# Patient Record
Sex: MALE | Race: WHITE | NOT HISPANIC OR LATINO | Employment: UNEMPLOYED | URBAN - METROPOLITAN AREA
[De-identification: names, ages, dates, MRNs, and addresses within clinical notes are randomized per-mention and may not be internally consistent; named-entity substitution may affect disease eponyms.]

---

## 2017-01-03 DIAGNOSIS — IMO0002 UNCONTROLLED DIABETES MELLITUS: ICD-10-CM

## 2017-01-03 RX ORDER — LOSARTAN POTASSIUM 25 MG/1
TABLET ORAL
Qty: 30 TABLET | Refills: 2 | Status: SHIPPED | OUTPATIENT
Start: 2017-01-03 | End: 2019-02-11

## 2017-01-03 RX ORDER — ASPIRIN 81 MG/1
TABLET ORAL
Qty: 30 TABLET | Refills: 2 | Status: SHIPPED | OUTPATIENT
Start: 2017-01-03 | End: 2019-02-18

## 2017-01-11 ENCOUNTER — OFFICE VISIT (OUTPATIENT)
Dept: ENDOCRINOLOGY | Age: 52
End: 2017-01-11

## 2017-01-11 VITALS
HEIGHT: 71 IN | BODY MASS INDEX: 31.95 KG/M2 | WEIGHT: 228.2 LBS | SYSTOLIC BLOOD PRESSURE: 130 MMHG | DIASTOLIC BLOOD PRESSURE: 82 MMHG

## 2017-01-11 DIAGNOSIS — IMO0002 UNCONTROLLED TYPE 2 DIABETES MELLITUS WITH COMPLICATION, WITHOUT LONG-TERM CURRENT USE OF INSULIN: Primary | ICD-10-CM

## 2017-01-11 DIAGNOSIS — R73.9 HYPERGLYCEMIA: ICD-10-CM

## 2017-01-11 PROCEDURE — 99214 OFFICE O/P EST MOD 30 MIN: CPT | Performed by: NURSE PRACTITIONER

## 2017-01-11 NOTE — MR AVS SNAPSHOT
Azael Carmen   1/11/2017 11:40 AM   Office Visit    Dept Phone:  [phone]   Encounter #:  [id number]    Provider:  KATERINA Hines   Department:  [facility] ENDOCRINOLOGY                Your Full Care Plan              Your Updated Medication List          This list is accurate as of: 1/11/17 12:35 PM.  Always use your most recent med list.                * albuterol (2.5 MG/3ML) 0.083% nebulizer solution   Commonly known as:  PROVENTIL       * albuterol 108 (90 BASE) MCG/ACT inhaler   Commonly known as:  PROVENTIL HFA;VENTOLIN HFA   Inhale 2 puffs Every 4 (Four) Hours As Needed for wheezing.       * aspirin 81 MG EC tablet       * aspirin 81 MG EC tablet   TAKE 1 TABLET BY MOUTH EVERY DAY       * aspirin 81 MG EC tablet   TAKE 1 TABLET BY MOUTH EVERY DAY       atorvastatin 20 MG tablet   Commonly known as:  LIPITOR   Take 1 tablet by mouth Daily.       budesonide-formoterol 160-4.5 MCG/ACT inhaler   Commonly known as:  SYMBICORT   Inhale 2 puffs 2 (Two) Times a Day.       Canagliflozin 300 MG tablet   Commonly known as:  INVOKANA   Take 300 mg by mouth Daily.       cefdinir 300 MG capsule   Commonly known as:  OMNICEF   Take 1 capsule by mouth 2 (Two) Times a Day.       fluticasone 50 MCG/ACT nasal spray   Commonly known as:  FLONASE   1 spray into each nostril Daily.       gabapentin 800 MG tablet   Commonly known as:  NEURONTIN       GuaiFENesin -20 MG tablet   TAKE 1 TABLET TWICE DAILY       Insulin Glargine 100 UNIT/ML injection pen   Commonly known as:  LANTUS SOLOSTAR   20 units in the morning NITRATE UP  UNITS MAX DAILY AS NEEDED       JANUVIA 100 MG tablet   Generic drug:  SITagliptin   TAKE 1 TABLET BY MOUTH EVERY DAY       lisinopril 10 MG tablet   Commonly known as:  PRINIVIL,ZESTRIL   TAKE 1 TABLET BY MOUTH EVERY DAY       * losartan 25 MG tablet   Commonly known as:  COZAAR       * losartan 25 MG tablet   Commonly known as:  COZAAR      TAKE 1 TABLET BY MOUTH ONCE DAILY       metFORMIN 1000 MG tablet   Commonly known as:  GLUCOPHAGE   Take 1 tablet by mouth 2 (Two) Times a Day With Meals.       * montelukast 10 MG tablet   Commonly known as:  SINGULAIR       * montelukast 10 MG tablet   Commonly known as:  SINGULAIR   TAKE 1 TABLET BY MOUTH EVERY NIGHT.       nateglinide 60 MG tablet   Commonly known as:  STARLIX   Take 1 tablet by mouth 3 (Three) Times a Day Before Meals.       Pen Needles 31G X 6 MM misc   One inj daily       predniSONE 10 MG tablet   Commonly known as:  DELTASONE   TAKE 5 TABS X 2 DAYS THEN 4 TABS X 2 DAYS THEN 3 TABS X 3 DAYS THEN 2 TABS X 3 DAYS THEN 1 TAB X 3 DAYS THEN STOP.       ticagrelor 90 MG tablet tablet   Commonly known as:  BRILINTA   Take 1 tablet by mouth 2 (Two) Times a Day.       V-R COMPLETE SENIOR tablet       varenicline 0.5 MG X 11 & 1 MG X 42 tablet   Commonly known as:  CHANTIX STARTING MONTH GUADALUPE   Take 0.5 mg one daily on days 1-2 and 0.5 mg twice daily on days 4-7. Then 1 mg twice daily for a total of 12 weeks.       vitamin D 88406 UNITS capsule capsule   Commonly known as:  ERGOCALCIFEROL       * Notice:  This list has 9 medication(s) that are the same as other medications prescribed for you. Read the directions carefully, and ask your doctor or other care provider to review them with you.            You Were Diagnosed With        Codes Comments    Uncontrolled type 2 diabetes mellitus with complication, without long-term current use of insulin    -  Primary ICD-10-CM: E11.8, E11.65  ICD-9-CM: 250.82     Hyperglycemia     ICD-10-CM: R73.9  ICD-9-CM: 790.29       Instructions    home blood tests - Blood glucose testing: 3-4 times daily, that are: fasting- 1st thing in morning before eating or drinking, before each meal and 1 or 2 hours after meal, bedtime, anytime you feel symptoms of hyperglycemia or hypoglycemia (high or low blood sugars)     New instructions for basal insulIn Lantus U100 30 units in  AM - Titration instructions - increase AM dose 1 units every 1 days if blood sugar before evening meal is over 120mg/dl      continue Starlix 60 minutes before each meal if you do not eat do not take,    Continue the metformin and Invokana     Patient Instructions History      Upcoming Appointments     Visit Type Date Time Department    OFFICE VISIT 2017 11:40 AM [facility]    OFFICE VISIT 2017 11:45 AM [facility]    [facility] 3/9/2017 10:00 AM [facility]    OFFICE VISIT 3/16/2017  2:40 PM [facility]    [facility] 2017 10:00 AM [facility]    OFFICE VISIT 2017 10:40 AM [facility]      [email or web address] Signup     [email or web address] allows you to send messages to your doctor, view your test results, renew your prescriptions, schedule appointments, and more. To sign up, go to [email or web address] and click on the Sign Up Now link in the New User? box. Enter your [employer] Activation Code exactly as it appears below along with the last four digits of your Social Security Number and your Date of Birth () to complete the sign-up process. If you do not sign up before the expiration date, you must request a new code.    [employer] Activation Code: [email or web address]  Expires: 2017 12:35 PM    If you have questions, you can email [email or web address] or call [phone] to talk to our [employer] staff. Remember, [employer] is NOT to be used for urgent needs. For medical emergencies, dial 911.               Other Info from Your Visit           Your Appointments     2017 11:45 AM EST   Office Visit with Yaquelin Burroughs MD   [facility] (--)    [facility]. [address] [place] [id number] [phone]           Arrive 15 minutes prior to appointment.            Mar 09, 2017 10:00 AM EST   [facility] with [facility], [facility]   [facility] (--)    [address]  [facility] [id number] [phone]            Mar 16, 2017  2:40 PM EDT   Office Visit with David Hardy MD   [facility] ENDOCRINOLOGY (--)    [address] [facility]  [facility] [id number] [phone]           Arrive 15 minutes prior to appointment.            Jun 12, 2017 10:00 AM EDT   [facility] with [facility]   [facility] (--)    [address] [facility] [facility] [place] [id number] [phone]            Jun 19, 2017 10:40 AM EDT   Office Visit with KATERINA Hines   [facility] ENDOCRINOLOGY (--)    [address] [place] [id number] [phone]           Arrive 15 minutes prior to appointment.              Allergies     Penicillins  Rash      Reason for Visit     Follow-up DM2, labs 12/7/16, (RM 1)      Vital Signs     Blood Pressure Height Weight Body Mass Index Smoking Status       130/82 71\" (180.3 cm) 228 lb 3.2 oz (104 kg) 31.83 kg/m2 Current Every Day Smoker       Problems and Diagnoses Noted     Type II diabetes mellitus without control    Hyperglycemia            "

## 2017-01-11 NOTE — PATIENT INSTRUCTIONS
home blood tests - Blood glucose testing: 3-4 times daily, that are: fasting- 1st thing in morning before eating or drinking, before each meal and 1 or 2 hours after meal, bedtime, anytime you feel symptoms of hyperglycemia or hypoglycemia (high or low blood sugars)     New instructions for basal insulIn Lantus U100 30 units in AM - Titration instructions - increase AM dose 1 units every 1 days if blood sugar before evening meal is over 120mg/dl      continue Starlix 60 minutes before each meal if you do not eat do not take,    Continue the metformin and Invokana

## 2017-01-11 NOTE — PROGRESS NOTES
Azael Carmen  Presents to the office  for the follow-up appointment for Type 2 diabetes mellitus.     Location is system with the  clinical course has fluctuated, the severity is Mild, and the modifying/allievating factors are insulin.  Medications and  Dosages were  reviewed with Azael Carmen and suggested that compliance most of the time.    Associated symptoms of hyperglycemia have been none.  Associated symptoms of hypoglycemia have been dizziness and sweating. Patient reports  hypoglycemia threshold for symptoms is 100 mg/dl .     The patient is currently on insulin.    Compliance with blood glucose monitoring: good.     Meal panning: The patient is using no plan.    The patient is currently taking home blood tests - Blood glucose testing: 3 times daily, that are: fasting- 1st thing in morning before eating or drinking  before each meal  The basal insulIn  Lantus U100 20 units in AM with his oral medications.     Last instructions given were:   New instructions for basal insulIn Lantus U100 20 units in AM - Titration instructions - increase AM dose 1 units every 1 days if blood sugar before evening meal is over 120mg/dl    Start Starlix 60 minutes before each meal if you do not eat do not take   Continue the metformin and Invokana       Home blood glucose testing daily: 3  FB to 145 mg/dl  before lunch 130 to 140 mg/dl  before dinner/supper 140 to 150 mg/dl    Last reported blood glucose readings are:   Home blood glucose testing daily: 3  FB to 140 mg/dl  after breakfast . to . mg/dl  before lunch 140 to 150 mg/dl  after lunch . to . mg/dl  before dinner/supper 140 to 150 mg/dl  after dinner/supper 150 to 160 mg/dl    Patterns reported per patient are none.          The following portions of the patient's history were reviewed and updated as appropriate: current medications, past family history, past medical history, past social history, past surgical history and problem list.      Current  Outpatient Prescriptions:   •  albuterol (PROVENTIL HFA;VENTOLIN HFA) 108 (90 BASE) MCG/ACT inhaler, Inhale 2 puffs Every 4 (Four) Hours As Needed for wheezing., Disp: 18 g, Rfl: 0  •  albuterol (PROVENTIL) (2.5 MG/3ML) 0.083% nebulizer solution, Take 2.5 mg by nebulization every 4 (four) hours as needed for wheezing., Disp: , Rfl:   •  aspirin 81 MG EC tablet, Take 81 mg by mouth daily., Disp: , Rfl:   •  aspirin 81 MG EC tablet, TAKE 1 TABLET BY MOUTH EVERY DAY, Disp: 30 tablet, Rfl: 2  •  aspirin 81 MG EC tablet, TAKE 1 TABLET BY MOUTH EVERY DAY, Disp: 30 tablet, Rfl: 2  •  atorvastatin (LIPITOR) 20 MG tablet, Take 1 tablet by mouth Daily., Disp: 30 tablet, Rfl: 2  •  budesonide-formoterol (SYMBICORT) 160-4.5 MCG/ACT inhaler, Inhale 2 puffs 2 (Two) Times a Day., Disp: 1 inhaler, Rfl: 3  •  Canagliflozin (INVOKANA) 300 MG tablet, Take 300 mg by mouth Daily., Disp: 30 tablet, Rfl: 4  •  cefdinir (OMNICEF) 300 MG capsule, Take 1 capsule by mouth 2 (Two) Times a Day., Disp: 20 capsule, Rfl: 0  •  Dextromethorphan-Guaifenesin (GUAIFENESIN DM) 400-20 MG tablet, TAKE 1 TABLET TWICE DAILY, Disp: 56 tablet, Rfl: 0  •  fluticasone (FLONASE) 50 MCG/ACT nasal spray, 1 spray into each nostril Daily., Disp: 1 each, Rfl: 3  •  gabapentin (NEURONTIN) 800 MG tablet, Take 800 mg by mouth 3 (Three) Times a Day., Disp: , Rfl:   •  Insulin Glargine (LANTUS SOLOSTAR) 100 UNIT/ML injection pen, 20 units in the morning NITRATE UP  UNITS MAX DAILY AS NEEDED, Disp: 3 pen, Rfl: 4  •  Insulin Pen Needle (PEN NEEDLES) 31G X 6 MM misc, One inj daily, Disp: 60 each, Rfl: 4  •  JANUVIA 100 MG tablet, TAKE 1 TABLET BY MOUTH EVERY DAY, Disp: 30 tablet, Rfl: 2  •  lisinopril (PRINIVIL,ZESTRIL) 10 MG tablet, TAKE 1 TABLET BY MOUTH EVERY DAY, Disp: 30 tablet, Rfl: 2  •  losartan (COZAAR) 25 MG tablet, Take 25 mg by mouth daily., Disp: , Rfl:   •  losartan (COZAAR) 25 MG tablet, TAKE 1 TABLET BY MOUTH ONCE DAILY, Disp: 30 tablet, Rfl: 2  •   metFORMIN (GLUCOPHAGE) 1000 MG tablet, Take 1 tablet by mouth 2 (Two) Times a Day With Meals., Disp: 60 tablet, Rfl: 4  •  montelukast (SINGULAIR) 10 MG tablet, Take 10 mg by mouth every night., Disp: , Rfl:   •  montelukast (SINGULAIR) 10 MG tablet, TAKE 1 TABLET BY MOUTH EVERY NIGHT., Disp: 30 tablet, Rfl: 2  •  Multiple Vitamins-Minerals (V-R COMPLETE SENIOR) tablet, Take 1 tablet by mouth daily., Disp: , Rfl:   •  nateglinide (STARLIX) 60 MG tablet, Take 1 tablet by mouth 3 (Three) Times a Day Before Meals., Disp: 90 tablet, Rfl: 4  •  predniSONE (DELTASONE) 10 MG tablet, TAKE 5 TABS X 2 DAYS THEN 4 TABS X 2 DAYS THEN 3 TABS X 3 DAYS THEN 2 TABS X 3 DAYS THEN 1 TAB X 3 DAYS THEN STOP., Disp: 36 tablet, Rfl: 0  •  ticagrelor (BRILINTA) 90 MG tablet tablet, Take 1 tablet by mouth 2 (Two) Times a Day., Disp: 120 tablet, Rfl: 6  •  varenicline (CHANTIX STARTING MONTH PAK) 0.5 MG X 11 & 1 MG X 42 tablet, Take 0.5 mg one daily on days 1-2 and 0.5 mg twice daily on days 4-7. Then 1 mg twice daily for a total of 12 weeks., Disp: 53 tablet, Rfl: 0  •  vitamin D (ERGOCALCIFEROL) 89628 UNITS capsule capsule, Take 50,000 Units by mouth 1 (One) Time Per Week., Disp: , Rfl:     Patient Active Problem List    Diagnosis   • Hypogonadism in male [E29.1]   • COPD, mild [J44.9]   • Chronic fatigue [R53.82]   • Elevated CPK [R74.8]   • Mixed hyperlipidemia [E78.2]   • Abnormal chest CT [R93.8]   • Arthritis [M19.90]   • Asthma [J45.909]   • Bulging of lumbar intervertebral disc [M51.26]   • Cervical disc disorder with radiculopathy of mid-cervical region [M50.120]   • Cervical radiculitis [M54.12]   • Cervicalgia [M54.2]   • Diabetes mellitus type 2, uncontrolled [E11.65]   • ED (erectile dysfunction) of organic origin [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.9]   • Paresthesias [R20.2]   • Obstructive sleep apnea [G47.33]   • Chest pain in adult [R07.9]       Review of Systems   A comprehensive review of the 14 systems was negative except of listed  "below:  Neurological: positive for numbness and tingling in feet & hands  Endocrine: hyperglycemia     Objective:     Wt Readings from Last 3 Encounters:   01/11/17 228 lb 3.2 oz (104 kg)   12/13/16 236 lb 3.2 oz (107 kg)   12/12/16 235 lb 6.4 oz (107 kg)     Temp Readings from Last 3 Encounters:   12/12/16 98.5 °F (36.9 °C) (Oral)   12/07/16 98.2 °F (36.8 °C) (Oral)   10/08/16 98.1 °F (36.7 °C) (Oral)     BP Readings from Last 3 Encounters:   01/11/17 130/82   12/13/16 160/80   12/12/16 116/60     Pulse Readings from Last 3 Encounters:   12/12/16 96   12/07/16 98   10/08/16 67          Visit Vitals   • /82   • Ht 71\" (180.3 cm)   • Wt 228 lb 3.2 oz (104 kg)   • BMI 31.83 kg/m2       General appearance:  alert, cooperative, delirious, fatigued, nourished\" \"appears stated age and cooperative\" \"NAD   Neck: no carotid bruit, supple, symmetrical, trachea midline and thyroid not enlarged, symmetric, no tenderness/mass/nodules   Thyroid:  no palpable nodule, no enlargement, no tenderness   Lung:  \"clear to auscultation bilaterally\" \"no abnormal breath sounds  \" effort was normal, no labored breath, no use of accessory muscles.   Heart: regular rate and rhythm, S1, S2 normal, no murmur, click, rub or gallop      Abdomen:  normal bowel sounds- 4 quads, soft non-tender, contour - rounded and contour - obese      Extremities: extremities normal, atraumatic, no cyanosis or edema extremities normal, atraumatic, no cyanosis or edema\" WNL - gait and station, strength and stability\"       Skin:   Pulses:  warm and dry, no hyperpigmentation, normal skin coloring, or suspicious lesions   2+ and symmetric   Neuro: Alert and oriented x3. Gait normal. Reflexes and motor strength normal and symmetric. Cranial nerves 2-12 and sensation grossly intact.      Psych: behavior - normal, judgement - normal, mood - normal, affect - normal                 Lab Review  Results for orders placed or performed in visit on 12/13/16   C-Peptide "   Result Value Ref Range    C-Peptide 3.3 1.1 - 4.4 ng/mL   Vitamin D 25 Hydroxy   Result Value Ref Range    25 Hydroxy, Vitamin D 24.6 (L) 30.0 - 100.0 ng/mL   Insulin, Total   Result Value Ref Range    Insulin 14.9 2.6 - 24.9 uIU/mL   Microalbumin / Creatinine Urine Ratio   Result Value Ref Range    Creatinine, Urine 39.9 Not Estab. mg/dL    Microalbumin, Urine 45.2 Not Estab. ug/mL    Microalbumin/Creatinine Ratio Urine 113.3 (H) 0.0 - 30.0 mg/g creat       Office Visit on 12/13/2016   Component Date Value Ref Range Status   • C-Peptide 12/13/2016 3.3  1.1 - 4.4 ng/mL Final    C-Peptide reference interval is for fasting patients.   • 25 Hydroxy, Vitamin D 12/13/2016 24.6* 30.0 - 100.0 ng/mL Final    Comment: Reference Range for Total Vitamin D 25(OH)  Deficiency    <20.0 ng/mL  Insufficiency 21-29 ng/mL  Sufficiency    ng/mL  Toxicity      >100 ng/ml        • Insulin 12/13/2016 14.9  2.6 - 24.9 uIU/mL Final   • Creatinine, Urine 12/13/2016 39.9  Not Estab. mg/dL Final   • Microalbumin, Urine 12/13/2016 45.2  Not Estab. ug/mL Final   • Microalbumin/Creatinine Ratio Urine 12/13/2016 113.3* 0.0 - 30.0 mg/g creat Final           Assessment:   Azael was seen today for follow-up.    Diagnoses and all orders for this visit:    Uncontrolled type 2 diabetes mellitus with complication, without long-term current use of insulin    Hyperglycemia        Plan:    Education:  interpretation of lab results, blood sugar goals, complications of diabetes mellitus, hypoglycemia prevention and treatment, exercise, illness management, self-monitoring of blood glucose skills, nutrition and carbohydrate counting    Medications changes:  home blood tests - Blood glucose testing: 3-4 times daily, that are: fasting- 1st thing in morning before eating or drinking, before each meal and 1 or 2 hours after meal, bedtime, anytime you feel symptoms of hyperglycemia or hypoglycemia (high or low blood sugars)     New instructions for basal  insulIn Lantus U100 30 units in AM - Titration instructions - increase AM dose 1 units every 1 days if blood sugar before evening meal is over 120mg/dl      continue Starlix 60 minutes before each meal if you do not eat do not take,    Continue the metformin and Invokana      Office appointment 24 minutes with additional education given 12 minutes - insulin changes with titration of insulin, SMBG with goals.       Return in about 4 months (around 5/11/2017), or if symptoms worsen or fail to improve. keep March appointment and June appointment with Dr. Hardy and myself and lab appointment, call with any problems or concerns can be seen before appointments if needed

## 2017-01-12 ENCOUNTER — OFFICE VISIT (OUTPATIENT)
Dept: CARDIOLOGY | Facility: CLINIC | Age: 52
End: 2017-01-12

## 2017-01-12 VITALS
HEART RATE: 82 BPM | SYSTOLIC BLOOD PRESSURE: 149 MMHG | BODY MASS INDEX: 31.24 KG/M2 | WEIGHT: 224 LBS | DIASTOLIC BLOOD PRESSURE: 92 MMHG

## 2017-01-12 DIAGNOSIS — R06.02 SHORTNESS OF BREATH: ICD-10-CM

## 2017-01-12 DIAGNOSIS — J44.9 COPD, MILD (HCC): ICD-10-CM

## 2017-01-12 DIAGNOSIS — I25.10 CAD IN NATIVE ARTERY: ICD-10-CM

## 2017-01-12 DIAGNOSIS — G47.33 OBSTRUCTIVE SLEEP APNEA: Primary | ICD-10-CM

## 2017-01-12 PROCEDURE — 99213 OFFICE O/P EST LOW 20 MIN: CPT | Performed by: INTERNAL MEDICINE

## 2017-01-12 PROCEDURE — 93000 ELECTROCARDIOGRAM COMPLETE: CPT | Performed by: INTERNAL MEDICINE

## 2017-01-12 NOTE — LETTER
2017     CAROLINA Tiwari  [address] [facility] [address]    Patient: Azael Carmen   YOB: 1965   Date of Visit: 2017       Dear CAROLINA Bess:    Thank you for referring Azael Carmen to me for evaluation. Below are the relevant portions of my assessment and plan of care.    If you have questions, please do not hesitate to call me. I look forward to following Azael along with you.         Sincerely,        Yaquelin Burroughs MD        CC: No Recipients  Yaquelin Burroughs MD  2017  1:33 PM  Sign at close encounter  Procedure   [facility]  Cardiology Progress Note    Patient Identification:  Name:Azael Carmen  Age:51 y.o.  Sex: male  :  1965  MRN: [id number]           2017    Subjective:    Chief Complaint   Patient presents with   • Chest Pain   CAD    HPI     Patient is a 51 y.o. male with copd, smoking came for PCI of the severe stenosis in the right coronary artery which he underwent with Bioabsorbable stent with excellent result. No post op complications. No angina     He continues to smoke and has paraxysmal wheezing and smoking cessation is recommended     No angina post procedure     He did have a  of mid LCX and he is not having any angina. Smoking cessation program is strongly recommended    He also has stable gall stone    Review of Systems   Constitution: Positive for malaise/fatigue. Negative for chills and fever.   HENT: Negative for headaches.    Eyes: Negative for blurred vision and double vision.   Cardiovascular: Negative for chest pain, irregular heartbeat, leg swelling and palpitations.   Respiratory: Positive for snoring (cpap). Negative for shortness of breath.    Skin: Negative for color change.   Musculoskeletal: Negative for arthritis and joint pain.   Gastrointestinal: Negative for abdominal pain, nausea and vomiting.   Neurological: Positive for light-headedness (when stands to fast) and numbness.  Negative for dizziness.   Psychiatric/Behavioral: Positive for depression.       Past Medical History   Diagnosis Date   • Allergic rhinitis    • Asthma    • COPD (chronic obstructive pulmonary disease)    • Diabetes mellitus    • Heart murmur    • Hypertension    • Mixed hyperlipidemia 7/19/2016       Past Surgical History   Procedure Laterality Date   • No past surgeries     • Vasectomy     • Cardiac catheterization N/A 9/6/2016     Procedure: Coronary angiography;  Surgeon: Blaise Burroughs MD;  Location: [facility] CATH INVASIVE LOCATION;  Service:    • Cardiac catheterization N/A 9/6/2016     Procedure: Left Heart Cath;  Surgeon: Blaise Burroughs MD;  Location:  [facility] CATH INVASIVE LOCATION;  Service:    • Cardiac catheterization N/A 9/6/2016     Procedure: Left ventriculography;  Surgeon: Blaise Burroughs MD;  Location: [facility] CATH INVASIVE LOCATION;  Service:    • Cardiac catheterization N/A 10/7/2016     Procedure: Chronic Total Occlussion;  Surgeon: Blaise Burroughs MD;  Location: [facility] CATH INVASIVE LOCATION;  Service:        Social History     Social History   • Marital status:      Spouse name: N/A   • Number of children: N/A   • Years of education: N/A     Occupational History   • Not on file.     Social History Main Topics   • Smoking status: Current Every Day Smoker     Packs/day: 0.50     Years: 40.00     Types: Cigarettes   • Smokeless tobacco: Never Used   • Alcohol use No   • Drug use: No   • Sexual activity: Defer     Other Topics Concern   • Not on file     Social History Narrative       Family History   Problem Relation Age of Onset   • Hypertension Father    • Heart disease Father    • Heart attack Father    • Hypertension Brother    • Cancer Paternal Aunt    • Cancer Maternal Grandmother    • Cancer Maternal Grandfather    • Cancer Paternal Grandmother    • Cancer Paternal Grandfather        Scheduled Meds:    Current Outpatient Prescriptions:   •  albuterol  (PROVENTIL HFA;VENTOLIN HFA) 108 (90 BASE) MCG/ACT inhaler, Inhale 2 puffs Every 4 (Four) Hours As Needed for wheezing., Disp: 18 g, Rfl: 0  •  albuterol (PROVENTIL) (2.5 MG/3ML) 0.083% nebulizer solution, Take 2.5 mg by nebulization every 4 (four) hours as needed for wheezing., Disp: , Rfl:   •  aspirin 81 MG EC tablet, Take 81 mg by mouth daily., Disp: , Rfl:   •  aspirin 81 MG EC tablet, TAKE 1 TABLET BY MOUTH EVERY DAY, Disp: 30 tablet, Rfl: 2  •  aspirin 81 MG EC tablet, TAKE 1 TABLET BY MOUTH EVERY DAY, Disp: 30 tablet, Rfl: 2  •  atorvastatin (LIPITOR) 20 MG tablet, Take 1 tablet by mouth Daily., Disp: 30 tablet, Rfl: 2  •  budesonide-formoterol (SYMBICORT) 160-4.5 MCG/ACT inhaler, Inhale 2 puffs 2 (Two) Times a Day., Disp: 1 inhaler, Rfl: 3  •  Canagliflozin (INVOKANA) 300 MG tablet, Take 300 mg by mouth Daily., Disp: 30 tablet, Rfl: 4  •  cefdinir (OMNICEF) 300 MG capsule, Take 1 capsule by mouth 2 (Two) Times a Day., Disp: 20 capsule, Rfl: 0  •  Dextromethorphan-Guaifenesin (GUAIFENESIN DM) 400-20 MG tablet, TAKE 1 TABLET TWICE DAILY, Disp: 56 tablet, Rfl: 0  •  fluticasone (FLONASE) 50 MCG/ACT nasal spray, 1 spray into each nostril Daily., Disp: 1 each, Rfl: 3  •  gabapentin (NEURONTIN) 800 MG tablet, Take 800 mg by mouth 3 (Three) Times a Day., Disp: , Rfl:   •  Insulin Glargine (LANTUS SOLOSTAR) 100 UNIT/ML injection pen, 20 units in the morning NITRATE UP  UNITS MAX DAILY AS NEEDED, Disp: 3 pen, Rfl: 4  •  Insulin Pen Needle (PEN NEEDLES) 31G X 6 MM misc, One inj daily, Disp: 60 each, Rfl: 4  •  JANUVIA 100 MG tablet, TAKE 1 TABLET BY MOUTH EVERY DAY, Disp: 30 tablet, Rfl: 2  •  lisinopril (PRINIVIL,ZESTRIL) 10 MG tablet, TAKE 1 TABLET BY MOUTH EVERY DAY, Disp: 30 tablet, Rfl: 2  •  losartan (COZAAR) 25 MG tablet, TAKE 1 TABLET BY MOUTH ONCE DAILY, Disp: 30 tablet, Rfl: 2  •  metFORMIN (GLUCOPHAGE) 1000 MG tablet, Take 1 tablet by mouth 2 (Two) Times a Day With Meals., Disp: 60 tablet, Rfl: 4  •   montelukast (SINGULAIR) 10 MG tablet, TAKE 1 TABLET BY MOUTH EVERY NIGHT., Disp: 30 tablet, Rfl: 2  •  Multiple Vitamins-Minerals (V-R COMPLETE SENIOR) tablet, Take 1 tablet by mouth daily., Disp: , Rfl:   •  nateglinide (STARLIX) 60 MG tablet, Take 1 tablet by mouth 3 (Three) Times a Day Before Meals., Disp: 90 tablet, Rfl: 4  •  predniSONE (DELTASONE) 10 MG tablet, TAKE 5 TABS X 2 DAYS THEN 4 TABS X 2 DAYS THEN 3 TABS X 3 DAYS THEN 2 TABS X 3 DAYS THEN 1 TAB X 3 DAYS THEN STOP., Disp: 36 tablet, Rfl: 0  •  ticagrelor (BRILINTA) 90 MG tablet tablet, Take 1 tablet by mouth 2 (Two) Times a Day., Disp: 120 tablet, Rfl: 6  •  varenicline (CHANTIX STARTING MONTH PAK) 0.5 MG X 11 & 1 MG X 42 tablet, Take 0.5 mg one daily on days 1-2 and 0.5 mg twice daily on days 4-7. Then 1 mg twice daily for a total of 12 weeks., Disp: 53 tablet, Rfl: 0  •  vitamin D (ERGOCALCIFEROL) 42527 UNITS capsule capsule, Take 50,000 Units by mouth 1 (One) Time Per Week., Disp: , Rfl:     Objective:  Visit Vitals   • /92   • Pulse 82   • Wt 224 lb (102 kg)   • BMI 31.24 kg/m2        Physical Exam  Physical Exam:    General: No acute distress.    Skin: Warm and dry, no diaphoresis noted   HEENT: No ptosis; external ear and nose normal; oral mucosa moist   Neck: Supple; no carotid bruits; no JVD, Trachea mid line   Heart: S1S2 regular rate and rhythm; no murmurs; no gallop or rub appreciated, apex not displaced   Chest: Respirations regular, unlabored at rest, bilateral breath sounds have good air entry; no  wheezes auscultated.     Abdomen: Soft, non-tender, non-distended, positive bowel sounds  No hepatosplenomegaly   Extremities: Bilateral lower extremities have no pre-tibial pitting edema; Radials are palpable   Neurological: Alert and oriented x 3; no new motor deficits,           ECG 12 Lead  Date/Time: 1/12/2017 1:31 PM  Performed by: KYRA GALVEZ  Authorized by: KYRA GALVEZ   Comparison: compared with previous  ECG   Similar to previous ECG  Rhythm: sinus rhythm  Rate: normal  QRS axis: normal               Assessment:  Problem List Items Addressed This Visit        Respiratory    Obstructive sleep apnea - Primary    COPD, mild    RESOLVED: Shortness of breath          Plan:    Overall clinically he has been stable with no further angina. I will not recommend the  recanalization of the LCX at this time.  Continue DAPT. I will see him back in 6 months. Again smoking cessation is recommended.      01/12/2017  Blaise Burroughs MD, FACC

## 2017-01-12 NOTE — PROGRESS NOTES
Procedure   [facility]  Cardiology Progress Note    Patient Identification:  Name:Azael Carmen  Age:51 y.o.  Sex: male  :  1965  MRN: [id number]           2017    Subjective:    Chief Complaint   Patient presents with   • Chest Pain   CAD    HPI     Patient is a 51 y.o. male with copd, smoking came for PCI of the severe stenosis in the right coronary artery which he underwent with Bioabsorbable stent with excellent result. No post op complications. No angina     He continues to smoke and has paraxysmal wheezing and smoking cessation is recommended     No angina post procedure     He did have a  of mid LCX and he is not having any angina. Smoking cessation program is strongly recommended    He also has stable gall stone    Review of Systems   Constitution: Positive for malaise/fatigue. Negative for chills and fever.   HENT: Negative for headaches.    Eyes: Negative for blurred vision and double vision.   Cardiovascular: Negative for chest pain, irregular heartbeat, leg swelling and palpitations.   Respiratory: Positive for snoring (cpap). Negative for shortness of breath.    Skin: Negative for color change.   Musculoskeletal: Negative for arthritis and joint pain.   Gastrointestinal: Negative for abdominal pain, nausea and vomiting.   Neurological: Positive for light-headedness (when stands to fast) and numbness. Negative for dizziness.   Psychiatric/Behavioral: Positive for depression.       Past Medical History   Diagnosis Date   • Allergic rhinitis    • Asthma    • COPD (chronic obstructive pulmonary disease)    • Diabetes mellitus    • Heart murmur    • Hypertension    • Mixed hyperlipidemia 2016       Past Surgical History   Procedure Laterality Date   • No past surgeries     • Vasectomy     • Cardiac catheterization N/A 2016     Procedure: Coronary angiography;  Surgeon: Blaise Burroughs MD;  Location: [facility] INVASIVE LOCATION;  Service:    • Cardiac  catheterization N/A 9/6/2016     Procedure: Left Heart Cath;  Surgeon: Blaise Burroughs MD;  Location: [facility] CATH INVASIVE LOCATION;  Service:    • Cardiac catheterization N/A 9/6/2016     Procedure: Left ventriculography;  Surgeon: Blaise Burroughs MD;  Location: [facility] CATH INVASIVE LOCATION;  Service:    • Cardiac catheterization N/A 10/7/2016     Procedure: Chronic Total Occlussion;  Surgeon: Blaise Burroughs MD;  Location: [facility] CATH INVASIVE LOCATION;  Service:        Social History     Social History   • Marital status:      Spouse name: N/A   • Number of children: N/A   • Years of education: N/A     Occupational History   • Not on file.     Social History Main Topics   • Smoking status: Current Every Day Smoker     Packs/day: 0.50     Years: 40.00     Types: Cigarettes   • Smokeless tobacco: Never Used   • Alcohol use No   • Drug use: No   • Sexual activity: Defer     Other Topics Concern   • Not on file     Social History Narrative       Family History   Problem Relation Age of Onset   • Hypertension Father    • Heart disease Father    • Heart attack Father    • Hypertension Brother    • Cancer Paternal Aunt    • Cancer Maternal Grandmother    • Cancer Maternal Grandfather    • Cancer Paternal Grandmother    • Cancer Paternal Grandfather        Scheduled Meds:    Current Outpatient Prescriptions:   •  albuterol (PROVENTIL HFA;VENTOLIN HFA) 108 (90 BASE) MCG/ACT inhaler, Inhale 2 puffs Every 4 (Four) Hours As Needed for wheezing., Disp: 18 g, Rfl: 0  •  albuterol (PROVENTIL) (2.5 MG/3ML) 0.083% nebulizer solution, Take 2.5 mg by nebulization every 4 (four) hours as needed for wheezing., Disp: , Rfl:   •  aspirin 81 MG EC tablet, Take 81 mg by mouth daily., Disp: , Rfl:   •  aspirin 81 MG EC tablet, TAKE 1 TABLET BY MOUTH EVERY DAY, Disp: 30 tablet, Rfl: 2  •  aspirin 81 MG EC tablet, TAKE 1 TABLET BY MOUTH EVERY DAY, Disp: 30 tablet, Rfl: 2  •  atorvastatin (LIPITOR) 20 MG tablet,  Take 1 tablet by mouth Daily., Disp: 30 tablet, Rfl: 2  •  budesonide-formoterol (SYMBICORT) 160-4.5 MCG/ACT inhaler, Inhale 2 puffs 2 (Two) Times a Day., Disp: 1 inhaler, Rfl: 3  •  Canagliflozin (INVOKANA) 300 MG tablet, Take 300 mg by mouth Daily., Disp: 30 tablet, Rfl: 4  •  cefdinir (OMNICEF) 300 MG capsule, Take 1 capsule by mouth 2 (Two) Times a Day., Disp: 20 capsule, Rfl: 0  •  Dextromethorphan-Guaifenesin (GUAIFENESIN DM) 400-20 MG tablet, TAKE 1 TABLET TWICE DAILY, Disp: 56 tablet, Rfl: 0  •  fluticasone (FLONASE) 50 MCG/ACT nasal spray, 1 spray into each nostril Daily., Disp: 1 each, Rfl: 3  •  gabapentin (NEURONTIN) 800 MG tablet, Take 800 mg by mouth 3 (Three) Times a Day., Disp: , Rfl:   •  Insulin Glargine (LANTUS SOLOSTAR) 100 UNIT/ML injection pen, 20 units in the morning NITRATE UP  UNITS MAX DAILY AS NEEDED, Disp: 3 pen, Rfl: 4  •  Insulin Pen Needle (PEN NEEDLES) 31G X 6 MM misc, One inj daily, Disp: 60 each, Rfl: 4  •  JANUVIA 100 MG tablet, TAKE 1 TABLET BY MOUTH EVERY DAY, Disp: 30 tablet, Rfl: 2  •  lisinopril (PRINIVIL,ZESTRIL) 10 MG tablet, TAKE 1 TABLET BY MOUTH EVERY DAY, Disp: 30 tablet, Rfl: 2  •  losartan (COZAAR) 25 MG tablet, TAKE 1 TABLET BY MOUTH ONCE DAILY, Disp: 30 tablet, Rfl: 2  •  metFORMIN (GLUCOPHAGE) 1000 MG tablet, Take 1 tablet by mouth 2 (Two) Times a Day With Meals., Disp: 60 tablet, Rfl: 4  •  montelukast (SINGULAIR) 10 MG tablet, TAKE 1 TABLET BY MOUTH EVERY NIGHT., Disp: 30 tablet, Rfl: 2  •  Multiple Vitamins-Minerals (V-R COMPLETE SENIOR) tablet, Take 1 tablet by mouth daily., Disp: , Rfl:   •  nateglinide (STARLIX) 60 MG tablet, Take 1 tablet by mouth 3 (Three) Times a Day Before Meals., Disp: 90 tablet, Rfl: 4  •  predniSONE (DELTASONE) 10 MG tablet, TAKE 5 TABS X 2 DAYS THEN 4 TABS X 2 DAYS THEN 3 TABS X 3 DAYS THEN 2 TABS X 3 DAYS THEN 1 TAB X 3 DAYS THEN STOP., Disp: 36 tablet, Rfl: 0  •  ticagrelor (BRILINTA) 90 MG tablet tablet, Take 1 tablet by mouth  2 (Two) Times a Day., Disp: 120 tablet, Rfl: 6  •  varenicline (CHANTIX STARTING MONTH PAK) 0.5 MG X 11 & 1 MG X 42 tablet, Take 0.5 mg one daily on days 1-2 and 0.5 mg twice daily on days 4-7. Then 1 mg twice daily for a total of 12 weeks., Disp: 53 tablet, Rfl: 0  •  vitamin D (ERGOCALCIFEROL) 57350 UNITS capsule capsule, Take 50,000 Units by mouth 1 (One) Time Per Week., Disp: , Rfl:     Objective:  Visit Vitals   • /92   • Pulse 82   • Wt 224 lb (102 kg)   • BMI 31.24 kg/m2        Physical Exam  Physical Exam:    General: No acute distress.    Skin: Warm and dry, no diaphoresis noted   HEENT: No ptosis; external ear and nose normal; oral mucosa moist   Neck: Supple; no carotid bruits; no JVD, Trachea mid line   Heart: S1S2 regular rate and rhythm; no murmurs; no gallop or rub appreciated, apex not displaced   Chest: Respirations regular, unlabored at rest, bilateral breath sounds have good air entry; no  wheezes auscultated.     Abdomen: Soft, non-tender, non-distended, positive bowel sounds  No hepatosplenomegaly   Extremities: Bilateral lower extremities have no pre-tibial pitting edema; Radials are palpable   Neurological: Alert and oriented x 3; no new motor deficits,           ECG 12 Lead  Date/Time: 1/12/2017 1:31 PM  Performed by: KYRA BURROUGHS  Authorized by: KYRA BURROUGHS   Comparison: compared with previous ECG   Similar to previous ECG  Rhythm: sinus rhythm  Rate: normal  QRS axis: normal               Assessment:  Problem List Items Addressed This Visit        Respiratory    Obstructive sleep apnea - Primary    COPD, mild    RESOLVED: Shortness of breath          Plan:    Overall clinically he has been stable with no further angina. I will not recommend the  recanalization of the LCX at this time.  Continue DAPT. I will see him back in 6 months. Again smoking cessation is recommended.      01/12/2017  Blaise Burroughs MD, FACC

## 2017-01-12 NOTE — MR AVS SNAPSHOT
Azael Carmen   1/12/2017 11:45 AM   Office Visit    Dept Phone:  [phone]   Encounter #:  [id number]    Provider:  Yaquelin Burroughs MD   Department:  [facility]                Your Full Care Plan              Today's Medication Changes          These changes are accurate as of: 1/12/17  1:36 PM.  If you have any questions, ask your nurse or doctor.               Medication(s)that have changed:     losartan 25 MG tablet   Commonly known as:  COZAAR   TAKE 1 TABLET BY MOUTH ONCE DAILY   What changed:  Another medication with the same name was removed. Continue taking this medication, and follow the directions you see here.   Changed by:  CAROLINA Tiwari       montelukast 10 MG tablet   Commonly known as:  SINGULAIR   TAKE 1 TABLET BY MOUTH EVERY NIGHT.   What changed:  Another medication with the same name was removed. Continue taking this medication, and follow the directions you see here.   Changed by:  CAROLINA Tiwari                  Your Updated Medication List          This list is accurate as of: 1/12/17  1:36 PM.  Always use your most recent med list.                * albuterol (2.5 MG/3ML) 0.083% nebulizer solution   Commonly known as:  PROVENTIL       * albuterol 108 (90 BASE) MCG/ACT inhaler   Commonly known as:  PROVENTIL HFA;VENTOLIN HFA   Inhale 2 puffs Every 4 (Four) Hours As Needed for wheezing.       * aspirin 81 MG EC tablet       * aspirin 81 MG EC tablet   TAKE 1 TABLET BY MOUTH EVERY DAY       * aspirin 81 MG EC tablet   TAKE 1 TABLET BY MOUTH EVERY DAY       atorvastatin 20 MG tablet   Commonly known as:  LIPITOR   Take 1 tablet by mouth Daily.       budesonide-formoterol 160-4.5 MCG/ACT inhaler   Commonly known as:  SYMBICORT   Inhale 2 puffs 2 (Two) Times a Day.       Canagliflozin 300 MG tablet   Commonly known as:  INVOKANA   Take 300 mg by mouth Daily.       cefdinir 300 MG capsule   Commonly known as:   OMNICEF   Take 1 capsule by mouth 2 (Two) Times a Day.       fluticasone 50 MCG/ACT nasal spray   Commonly known as:  FLONASE   1 spray into each nostril Daily.       gabapentin 800 MG tablet   Commonly known as:  NEURONTIN       GuaiFENesin -20 MG tablet   TAKE 1 TABLET TWICE DAILY       Insulin Glargine 100 UNIT/ML injection pen   Commonly known as:  LANTUS SOLOSTAR   20 units in the morning NITRATE UP  UNITS MAX DAILY AS NEEDED       JANUVIA 100 MG tablet   Generic drug:  SITagliptin   TAKE 1 TABLET BY MOUTH EVERY DAY       lisinopril 10 MG tablet   Commonly known as:  PRINIVIL,ZESTRIL   TAKE 1 TABLET BY MOUTH EVERY DAY       losartan 25 MG tablet   Commonly known as:  COZAAR   TAKE 1 TABLET BY MOUTH ONCE DAILY       metFORMIN 1000 MG tablet   Commonly known as:  GLUCOPHAGE   Take 1 tablet by mouth 2 (Two) Times a Day With Meals.       montelukast 10 MG tablet   Commonly known as:  SINGULAIR   TAKE 1 TABLET BY MOUTH EVERY NIGHT.       nateglinide 60 MG tablet   Commonly known as:  STARLIX   Take 1 tablet by mouth 3 (Three) Times a Day Before Meals.       Pen Needles 31G X 6 MM misc   One inj daily       predniSONE 10 MG tablet   Commonly known as:  DELTASONE   TAKE 5 TABS X 2 DAYS THEN 4 TABS X 2 DAYS THEN 3 TABS X 3 DAYS THEN 2 TABS X 3 DAYS THEN 1 TAB X 3 DAYS THEN STOP.       ticagrelor 90 MG tablet tablet   Commonly known as:  BRILINTA   Take 1 tablet by mouth 2 (Two) Times a Day.       V-R COMPLETE SENIOR tablet       varenicline 0.5 MG X 11 & 1 MG X 42 tablet   Commonly known as:  CHANTIX STARTING MONTH GUADALUPE   Take 0.5 mg one daily on days 1-2 and 0.5 mg twice daily on days 4-7. Then 1 mg twice daily for a total of 12 weeks.       vitamin D 33407 UNITS capsule capsule   Commonly known as:  ERGOCALCIFEROL       * Notice:  This list has 5 medication(s) that are the same as other medications prescribed for you. Read the directions carefully, and ask your doctor or other care provider to review them  with you.            We Performed the Following     ECG 12 Lead       You Were Diagnosed With        Codes Comments    Obstructive sleep apnea    -  Primary ICD-10-CM: G47.33  ICD-9-CM: 327.23     COPD, mild     ICD-10-CM: J44.9  ICD-9-CM: 496     Shortness of breath     ICD-10-CM: R06.02  ICD-9-CM: 786.05     CAD in native artery     ICD-10-CM: I25.10  ICD-9-CM: 414.01       Instructions     None    Patient Instructions History      Upcoming Appointments     Visit Type Date Time Department    OFFICE VISIT 2017 11:45 AM [facility]    [facility] 3/9/2017 10:00 AM [facility]    OFFICE VISIT 3/16/2017  2:40 PM [facility]    [facility] 2017 10:00 AM [facility]    OFFICE VISIT 2017 10:40 AM [facility]      [employer] Signup     [facility] [employer] allows you to send messages to your doctor, view your test results, renew your prescriptions, schedule appointments, and more. To sign up, go to [employer] and click on the Sign Up Now link in the New User? box. Enter your [employer] Activation Code exactly as it appears below along with the last four digits of your Social Security Number and your Date of Birth () to complete the sign-up process. If you do not sign up before the expiration date, you must request a new code.    [employer] Activation Code: [email or web address]  Expires: 2017 12:35 PM    If you have questions, you can email [email or web address] or call [phone] to talk to our [employer] staff. Remember, [employer] is NOT to be used for urgent needs. For medical emergencies, dial 911.               Other Info from Your Visit           Your Appointments     Mar 09, 2017 10:00 AM EST   [facility] with [facility], [facility]   [facility] (--)    [address] [place] [address]   [phone]            Mar 16, 2017  2:40 PM EDT   Office Visit with David Hardy MD   [facility]  GROUP ENDOCRINOLOGY (--)    [address]  [facility] [address]   [phone]           Arrive 15 minutes prior to appointment.            Jun 12, 2017 10:00 AM EDT   [facility] with [facility]   [facility] ENDOCRINOLOGY (--)    [address]  [facility] [address]   [phone]            Jun 19, 2017 10:40 AM EDT   Office Visit with KATERINA Hines   [facility] ENDOCRINOLOGY (--)    [address]  [facility] [address]   [phone]           Arrive 15 minutes prior to appointment.              Allergies     Penicillins  Rash      Reason for Visit     Chest Pain           Vital Signs     Blood Pressure Pulse Weight Body Mass Index Smoking Status       149/92 82 224 lb (102 kg) 31.24 kg/m2 Current Every Day Smoker       Problems and Diagnoses Noted     COPD, mild    Sleep apnea    Heart disease due to blocked artery          No Longer an Issue     Shortness of breath      Results     ECG 12 Lead

## 2017-02-13 DIAGNOSIS — K80.20 CALCULUS OF GALLBLADDER WITHOUT CHOLECYSTITIS WITHOUT OBSTRUCTION: Primary | ICD-10-CM

## 2017-03-03 DIAGNOSIS — E55.9 VITAMIN D DEFICIENCY: Primary | ICD-10-CM

## 2017-03-03 DIAGNOSIS — N52.9 ED (ERECTILE DYSFUNCTION) OF ORGANIC ORIGIN: ICD-10-CM

## 2017-03-03 DIAGNOSIS — E78.2 MIXED HYPERLIPIDEMIA: Primary | ICD-10-CM

## 2017-03-03 DIAGNOSIS — IMO0002 UNCONTROLLED TYPE 2 DIABETES MELLITUS WITH OTHER SPECIFIED COMPLICATION, WITHOUT LONG-TERM CURRENT USE OF INSULIN: ICD-10-CM

## 2017-03-03 DIAGNOSIS — R53.82 CHRONIC FATIGUE: ICD-10-CM

## 2017-03-03 DIAGNOSIS — E29.1 HYPOGONADISM IN MALE: ICD-10-CM

## 2017-04-01 ENCOUNTER — RESULTS ENCOUNTER (OUTPATIENT)
Dept: ENDOCRINOLOGY | Age: 52
End: 2017-04-01

## 2017-04-01 DIAGNOSIS — E78.2 MIXED HYPERLIPIDEMIA: ICD-10-CM

## 2017-04-01 DIAGNOSIS — E29.1 HYPOGONADISM IN MALE: ICD-10-CM

## 2017-04-01 DIAGNOSIS — IMO0002 UNCONTROLLED TYPE 2 DIABETES MELLITUS WITH OTHER SPECIFIED COMPLICATION, WITHOUT LONG-TERM CURRENT USE OF INSULIN: ICD-10-CM

## 2017-04-01 DIAGNOSIS — N52.9 ED (ERECTILE DYSFUNCTION) OF ORGANIC ORIGIN: ICD-10-CM

## 2017-04-01 DIAGNOSIS — R53.82 CHRONIC FATIGUE: ICD-10-CM

## 2017-04-01 DIAGNOSIS — E55.9 VITAMIN D DEFICIENCY: ICD-10-CM

## 2017-04-26 RX ORDER — ALBUTEROL SULFATE 90 UG/1
AEROSOL, METERED RESPIRATORY (INHALATION)
Qty: 18 G | Refills: 0 | OUTPATIENT
Start: 2017-04-26

## 2017-07-21 ENCOUNTER — TELEPHONE (OUTPATIENT)
Dept: FAMILY MEDICINE CLINIC | Facility: CLINIC | Age: 52
End: 2017-07-21

## 2017-07-21 NOTE — TELEPHONE ENCOUNTER
Mother stopped by to let us know that her son is in [facility] and needs a letter stating he needs to use his c-pap machine.    Please call [phone] with any information.    Thanks!

## 2019-02-11 ENCOUNTER — OFFICE VISIT (OUTPATIENT)
Dept: FAMILY MEDICINE CLINIC | Facility: CLINIC | Age: 54
End: 2019-02-11

## 2019-02-11 VITALS
SYSTOLIC BLOOD PRESSURE: 134 MMHG | BODY MASS INDEX: 41.92 KG/M2 | TEMPERATURE: 97.4 F | DIASTOLIC BLOOD PRESSURE: 72 MMHG | HEIGHT: 71 IN | OXYGEN SATURATION: 98 % | WEIGHT: 299.4 LBS | HEART RATE: 77 BPM

## 2019-02-11 DIAGNOSIS — E11.65 UNCONTROLLED TYPE 2 DIABETES MELLITUS WITH HYPERGLYCEMIA (HCC): Primary | ICD-10-CM

## 2019-02-11 DIAGNOSIS — R74.8 ELEVATED CPK: ICD-10-CM

## 2019-02-11 DIAGNOSIS — E29.1 HYPOGONADISM IN MALE: ICD-10-CM

## 2019-02-11 DIAGNOSIS — J45.21 INTERMITTENT ASTHMA WITH ACUTE EXACERBATION, UNSPECIFIED ASTHMA SEVERITY: ICD-10-CM

## 2019-02-11 DIAGNOSIS — R53.82 CHRONIC FATIGUE: ICD-10-CM

## 2019-02-11 DIAGNOSIS — M54.2 CERVICALGIA: ICD-10-CM

## 2019-02-11 DIAGNOSIS — J44.9 COPD, MILD (HCC): ICD-10-CM

## 2019-02-11 DIAGNOSIS — G47.33 OBSTRUCTIVE SLEEP APNEA: ICD-10-CM

## 2019-02-11 DIAGNOSIS — G56.03 BILATERAL CARPAL TUNNEL SYNDROME: ICD-10-CM

## 2019-02-11 DIAGNOSIS — R20.2 PARESTHESIAS: ICD-10-CM

## 2019-02-11 DIAGNOSIS — M51.36 BULGING OF LUMBAR INTERVERTEBRAL DISC: ICD-10-CM

## 2019-02-11 DIAGNOSIS — R07.9 CHEST PAIN IN ADULT: ICD-10-CM

## 2019-02-11 DIAGNOSIS — M50.120 CERVICAL DISC DISORDER WITH RADICULOPATHY OF MID-CERVICAL REGION: ICD-10-CM

## 2019-02-11 DIAGNOSIS — L40.9 PSORIASIS: ICD-10-CM

## 2019-02-11 DIAGNOSIS — E78.2 MIXED HYPERLIPIDEMIA: ICD-10-CM

## 2019-02-11 LAB
EXPIRATION DATE: ABNORMAL
HBA1C MFR BLD: 10.6 %
Lab: 914

## 2019-02-11 PROCEDURE — 83036 HEMOGLOBIN GLYCOSYLATED A1C: CPT | Performed by: PHYSICIAN ASSISTANT

## 2019-02-11 PROCEDURE — 99214 OFFICE O/P EST MOD 30 MIN: CPT | Performed by: PHYSICIAN ASSISTANT

## 2019-02-11 RX ORDER — TAMSULOSIN HYDROCHLORIDE 0.4 MG/1
1 CAPSULE ORAL DAILY
COMMUNITY
End: 2019-02-27 | Stop reason: SDUPTHER

## 2019-02-11 RX ORDER — FUROSEMIDE 40 MG/1
40 TABLET ORAL 2 TIMES DAILY
COMMUNITY
End: 2019-05-09

## 2019-02-11 RX ORDER — LOSARTAN POTASSIUM 100 MG/1
100 TABLET ORAL DAILY
COMMUNITY
End: 2019-02-27 | Stop reason: SDUPTHER

## 2019-02-11 RX ORDER — ALBUTEROL SULFATE 1.25 MG/3ML
1 SOLUTION RESPIRATORY (INHALATION) EVERY 6 HOURS PRN
COMMUNITY
End: 2019-02-18 | Stop reason: SDUPTHER

## 2019-02-11 RX ORDER — POTASSIUM CHLORIDE 750 MG/1
10 TABLET, EXTENDED RELEASE ORAL 2 TIMES DAILY
COMMUNITY
End: 2019-02-27 | Stop reason: SDUPTHER

## 2019-02-11 RX ORDER — HYDROXYZINE 50 MG/1
50 TABLET, FILM COATED ORAL 3 TIMES DAILY PRN
COMMUNITY
End: 2019-02-27 | Stop reason: SDUPTHER

## 2019-02-11 RX ORDER — GLIPIZIDE 10 MG/1
10 TABLET, FILM COATED, EXTENDED RELEASE ORAL DAILY
COMMUNITY
End: 2019-03-14

## 2019-02-11 NOTE — PROGRESS NOTES
"Subjective   Azael Carmen is a 53 y.o. male. Patient here today to establish care for diabetes, hypertension     History of Present Illness     Was incarcerated for 21 months.     Was to see cardiology on the day he was released from nursing home. He was not able to get to that appt due to being release date. Patient was having chest pain and had stress test and never got results. Was performed through  cardiology. Needs results. Does not think was seen by cardiology- thinks internist ordered stress test.     Did not otherwise see any specialists. Was not seeing endocrinology. Was started on methotrexate but was not seen by rheumatology or dermatology. Was started on that from [place].     Carpal tunnel syndrome with more trouble on left than right. Using braces but not seeing hand specialist.     Was using CPAP- pressures are up to 16.     Trying to \"get on disability\".     No other new events.     Glucose was 199 today. States readings 178, 325, 274, 263, 200, 254, 272, 145, 380, 183, 245, 282, 265, 334, 303, 354, 169, 272, 391, 216, 293, 296, 269, 270, 260, 322, 269, 319, 346no readings > 300s. States they \"seem like they are getting a little bit better\".     The following portions of the patient's history were reviewed and updated as appropriate: allergies, current medications, past family history, past medical history, past social history, past surgical history and problem list.    Review of Systems   All other systems reviewed and are negative.      Objective   Physical Exam   Constitutional: He is oriented to person, place, and time. He appears well-developed.   HENT:   Head: Normocephalic and atraumatic.   Right Ear: External ear normal.   Left Ear: External ear normal.   Eyes: Conjunctivae are normal.   Neck: Carotid bruit is not present. No tracheal deviation present. No thyroid mass and no thyromegaly present.   Cardiovascular: Normal rate, regular rhythm, normal heart sounds and intact distal pulses. "   Pulmonary/Chest: Effort normal and breath sounds normal.   Neurological: He is alert and oriented to person, place, and time. Gait normal.   Skin: Skin is warm and dry.   Psychiatric: He has a normal mood and affect. His behavior is normal. Judgment and thought content normal.   Nursing note and vitals reviewed.      Assessment/Plan   Azael was seen today for establish care, diabetes, hypertension and copd.    Diagnoses and all orders for this visit:    Uncontrolled type 2 diabetes mellitus with hyperglycemia (CMS/[facility])  -     POCT glycated hemoglobin, total  -     Ambulatory Referral to Endocrinology  -     Ambulatory Referral to Cardiology  -     Comprehensive Metabolic Panel  -     Thyroid Panel With TSH  -     Urinalysis With Microscopic - Urine, Clean Catch  -     Vitamin D 25 Hydroxy  -     PSA Screen    Mixed hyperlipidemia  -     Ambulatory Referral to Endocrinology  -     Ambulatory Referral to Cardiology  -     Comprehensive Metabolic Panel  -     Lipid Panel With LDL / HDL Ratio  -     Thyroid Panel With TSH  -     Urinalysis With Microscopic - Urine, Clean Catch  -     Vitamin D 25 Hydroxy    Intermittent asthma with acute exacerbation, unspecified asthma severity  -     Cancel: Ambulatory Referral to Sleep Medicine  -     Fluticasone Furoate-Vilanterol (BREO ELLIPTA) 100-25 MCG/INH inhaler; 1 PUFF ONCE DAILY THEN RINSE MOUTH  -     Ambulatory Referral to Pulmonology    COPD, mild (CMS/[facility])  -     Cancel: Ambulatory Referral to Sleep Medicine  -     Ambulatory Referral to Cardiology  -     Fluticasone Furoate-Vilanterol (BREO ELLIPTA) 100-25 MCG/INH inhaler; 1 PUFF ONCE DAILY THEN RINSE MOUTH  -     Ambulatory Referral to Pulmonology    Obstructive sleep apnea  -     Ambulatory Referral to Endocrinology  -     Cancel: Ambulatory Referral to Sleep Medicine  -     Ambulatory Referral to Cardiology  -     PSA Screen  -     Ambulatory Referral to Pulmonology    Hypogonadism in male  -     Ambulatory  Referral to Endocrinology  -     Ambulatory Referral to Cardiology  -     Comprehensive Metabolic Panel  -     Testosterone, Free, Total  -     Luteinizing Hormone  -     Follicle Stimulating Hormone  -     PSA Screen    Paresthesias  -     Ambulatory Referral to Endocrinology  -     CBC & Differential  -     Comprehensive Metabolic Panel  -     Vitamin B12 & Folate    Chest pain in adult  -     Ambulatory Referral to Cardiology    Cervicalgia    Bulging of lumbar intervertebral disc    Cervical disc disorder with radiculopathy of mid-cervical region    Elevated CPK  -     Ambulatory Referral to Endocrinology  -     CK    Chronic fatigue  -     Ambulatory Referral to Endocrinology  -     Vitamin D 25 Hydroxy    Psoriasis  -     Ambulatory Referral to Dermatology  -     CBC & Differential  -     Vitamin B12 & Folate    Bilateral carpal tunnel syndrome  -     Ambulatory Referral to Hand Surgery      Patient Instructions   53 YEAR OLD MALE WHO PRESENTS TODAY TO RE-ESTABLISH CARE AFTER 21 MONTH INCARCERATION AND IN FOLLOW UP OF DIABETES, HTN, HYPERLIPIDEMIA, HYPOGONADISM, ELEVATED CPK, OA, ASTHMA, IRINA, AND FATIGUE. BP IS BORDERLINE TODAY. HE WILL CONTINUE ON LOSARTAN 100 MG ONCE DAILY AND LASIX 40 MG DAILY. WE WILL CONTINUE TO MONITOR. HE WILL HAVE FASTING LABS AND CALL IF NO RESULTS IN 1 WEEK. FURTHER RECOMMENDATIONS PENDING LABS AND RECORDS. I HAVE ASKED THAT HE SIGN A MEDICAL RECORDS RELEASE FOR [facility], AND [facility] WHERE HE HAS RECEIVED CARE OVER THE PAST 21 MONTHS. PATIENT WAS PREVIOUSLY SEEING CARDIOLOGY AND ENDOCRINOLOGY- I WILL REFER BACK TODAY.  HE WAS ADVISED THAT THERE WAS A POTENTIAL HE WILL NEED ANOTHER STENT PLACED, HOWEVER, WE WILL WAIT FOR FOLLOW UP TO DETERMINE.      HE WAS SEEING SLEEP MEDICINE AND IS COMPLIANT WITH CPAP. HE WAS ALSO HAVING EVERY 6 MONTH CT CHEST. I WILL REFER BACK TO PULMONOLOGY/ SLEEP MEDICINE FOR FOLLOW UP AND CONTINUED TREATMENT AND RECOMMENDATIONS.       PATIENT WAS SEEING [facility] IN [place] FOR MENTAL HEALTH AND IS RE-ESTABLISHING. THEY ASKED ABOUT GENETIC TESTING FOR MOOD MEDICATIONS, HOWEVER, HE WANTED TO WAIT. HE WAS ALSO CONSIDERING PAIN MANAGEMENT IN [place]. HE LIKED HIS PREVIOUS PAIN MANAGEMENT, HOWEVER, IT WAS A LONG DRIVE.I WILL AWAIT LAB RESULTS AND RECORDS TO CONSIDER REFERRING TO PAIN MANAGEMENT.      PATIENT HAS ALSO BEEN DIAGNOSED WITH CARPAL TUNNEL SYNDROME AND HAS BEEN WEARING BRACES. HE CONTINUES WITH SYMPTOMS- I WILL REFER TO HAND SURGERY. HE ALSO WAS DIAGNOSED WITH PSORIASIS AND STARTED ON METHOTREXATE. I WILL REFER TO DERMATOLOGY.     HE WILL NEED TO FOLLOW UP PENDING LABS, SYMPTOMS, RECORDS, AND SPECIALISTS. FOLLOW UP IN NO MORE THAN 1 MONTH.

## 2019-02-14 ENCOUNTER — OFFICE VISIT (OUTPATIENT)
Dept: FAMILY MEDICINE CLINIC | Facility: CLINIC | Age: 54
End: 2019-02-14

## 2019-02-14 VITALS
DIASTOLIC BLOOD PRESSURE: 90 MMHG | OXYGEN SATURATION: 98 % | BODY MASS INDEX: 41.78 KG/M2 | SYSTOLIC BLOOD PRESSURE: 144 MMHG | HEART RATE: 72 BPM | RESPIRATION RATE: 18 BRPM | HEIGHT: 71 IN

## 2019-02-14 DIAGNOSIS — E11.65 UNCONTROLLED TYPE 2 DIABETES MELLITUS WITH HYPERGLYCEMIA (HCC): ICD-10-CM

## 2019-02-14 DIAGNOSIS — R07.2 SUBSTERNAL PAIN: Primary | ICD-10-CM

## 2019-02-14 LAB — GLUCOSE BLDC GLUCOMTR-MCNC: 224 MG/DL (ref 70–130)

## 2019-02-14 PROCEDURE — 99214 OFFICE O/P EST MOD 30 MIN: CPT | Performed by: FAMILY MEDICINE

## 2019-02-14 PROCEDURE — 93000 ELECTROCARDIOGRAM COMPLETE: CPT | Performed by: FAMILY MEDICINE

## 2019-02-14 PROCEDURE — 82962 GLUCOSE BLOOD TEST: CPT | Performed by: FAMILY MEDICINE

## 2019-02-14 NOTE — PROGRESS NOTES
Subjective   Azael Carmen is a 53 y.o. male. Presents today as a walk in with chest pain and abdominal with that is radiating into should blades along with shortness of breath and cold sweats, ongoing since last night.    History of Present Illness     New patient to me    Patient says that he came in today because the last couple of days he has been having some pain right underneath his sternum which has been radiating from the right upper quadrant to the left upper quadrant of his abdomen and then that would radiate to his back.  He says this is causing him to have some cold sweats and nausea with occasional shortness of breath.  Of note he has a history of a calculus in the gallbladder which was going to be addressed before he was incarcerated so it never got addressed.  Also has a history of a coronary stent placed approximately 2-1/2 years ago.  He denies that the pain is directly in his chest or going down either arm or into his jaw.  He mentions that he feels Like he did when he had diabetic ketoacidosis but is not sure what his blood sugars running right now.    The following portions of the patient's history were reviewed and updated as appropriate: allergies, current medications, past family history, past medical history, past social history, past surgical history and problem list.    Review of Systems   Constitutional: Positive for chills and fatigue. Negative for activity change, appetite change and fever.        Cold sweats   HENT: Negative for ear pain, facial swelling and sore throat.    Eyes: Negative for discharge and itching.   Respiratory: Positive for shortness of breath. Negative for cough and chest tightness.    Cardiovascular: Negative for chest pain, palpitations and leg swelling.   Gastrointestinal: Positive for abdominal pain, diarrhea and nausea. Negative for abdominal distention, anal bleeding, blood in stool, constipation, rectal pain and vomiting.   Endocrine: Negative for polydipsia,  polyphagia and polyuria.   Genitourinary: Negative for difficulty urinating and flank pain.   Musculoskeletal: Negative for arthralgias and back pain.   Skin: Negative for color change, rash and wound.   Allergic/Immunologic: Negative for environmental allergies and food allergies.   Neurological: Negative for weakness and numbness.   Hematological: Negative for adenopathy. Does not bruise/bleed easily.   Psychiatric/Behavioral: Negative for decreased concentration and dysphoric mood. The patient is not nervous/anxious.        Objective   Physical Exam   Constitutional: No distress.   Obese  Appears to be in pain   Eyes: Conjunctivae are normal. Right eye exhibits no discharge. Left eye exhibits no discharge.   Neck: Neck supple.   Cardiovascular: Normal rate, regular rhythm and normal heart sounds. Exam reveals no gallop and no friction rub.   No murmur heard.  Pulmonary/Chest: Effort normal and breath sounds normal. No respiratory distress. He has no wheezes. He has no rales.   Abdominal: Soft. Bowel sounds are normal. He exhibits no distension. There is no hepatosplenomegaly. There is tenderness in the right upper quadrant and epigastric area. There is no rigidity, no rebound, no guarding, no CVA tenderness, no tenderness at McBurney's point and negative Marsh's sign.   Lymphadenopathy:     He has no cervical adenopathy.   Skin: Skin is warm. Capillary refill takes less than 2 seconds. He is not diaphoretic.   Nursing note and vitals reviewed.     Adult ECG Report     Name: Azael Carmen   Age: 53 y.o.   Gender: male       Rate: 68   Rhythm: premature ventricular contractions (PVC)   QRS Axis: n/a   TX Interval: 179   QRS Duration: 88   QTc: 386   Voltages: 10   Conduction Disturbances: none   Other Abnormalities: none     Narrative Interpretation: Sinus rhythm with occasional ventricular complexes    Assessment/Plan   Azael was seen today for chest pain and abdominal pain.    Diagnoses and all orders for this  visit:    Substernal pain  -     ECG 12 Lead    Uncontrolled type 2 diabetes mellitus with hyperglycemia (CMS/HCC)  -     POCT Glucose    After doing EKG and physical exam it appears that the pain is most likely from either right upper quadrant or substernal making it much more likely that the gallbladder could be an issue or the pancreas.  Either way the patient again needs imaging and labs that will need to be done in a more stat fashion which would be better off in the ER.  I did offer him an ambulance just because I could not 100% rule out that it was cardiac although the EKG the physical exam and history did not suggest that.  He declined and stated he wanted to drive himself to the ER.  I later found out that he was heading to his parents house and was hopefully going to go to the ER from there.  Follow-up once he is finished in the ER.

## 2019-02-14 NOTE — PATIENT INSTRUCTIONS
Abdominal Pain, Adult  Many things can cause belly (abdominal) pain. Most times, belly pain is not dangerous. Many cases of belly pain can be watched and treated at home. Sometimes belly pain is serious, though. Your doctor will try to find the cause of your belly pain.  Follow these instructions at home:  · Take over-the-counter and prescription medicines only as told by your doctor. Do not take medicines that help you poop (laxatives) unless told to by your doctor.  · Drink enough fluid to keep your pee (urine) clear or pale yellow.  · Watch your belly pain for any changes.  · Keep all follow-up visits as told by your doctor. This is important.  Contact a doctor if:  · Your belly pain changes or gets worse.  · You are not hungry, or you lose weight without trying.  · You are having trouble pooping (constipated) or have watery poop (diarrhea) for more than 2-3 days.  · You have pain when you pee or poop.  · Your belly pain wakes you up at night.  · Your pain gets worse with meals, after eating, or with certain foods.  · You are throwing up and cannot keep anything down.  · You have a fever.  Get help right away if:  · Your pain does not go away as soon as your doctor says it should.  · You cannot stop throwing up.  · Your pain is only in areas of your belly, such as the right side or the left lower part of the belly.  · You have bloody or black poop, or poop that looks like tar.  · You have very bad pain, cramping, or bloating in your belly.  · You have signs of not having enough fluid or water in your body (dehydration), such as:  ? Dark pee, very little pee, or no pee.  ? Cracked lips.  ? Dry mouth.  ? Sunken eyes.  ? Sleepiness.  ? Weakness.  This information is not intended to replace advice given to you by your health care provider. Make sure you discuss any questions you have with your health care provider.  Document Released: 06/05/2009 Document Revised: 07/07/2017 Document Reviewed: 05/31/2017  [employer]  Interactive Patient Education © 2018 [employer]

## 2019-02-15 ENCOUNTER — TELEPHONE (OUTPATIENT)
Dept: FAMILY MEDICINE CLINIC | Facility: CLINIC | Age: 54
End: 2019-02-15

## 2019-02-15 DIAGNOSIS — J44.9 COPD, MILD (HCC): ICD-10-CM

## 2019-02-15 DIAGNOSIS — J45.21 INTERMITTENT ASTHMA WITH ACUTE EXACERBATION, UNSPECIFIED ASTHMA SEVERITY: ICD-10-CM

## 2019-02-15 DIAGNOSIS — R73.9 HYPERGLYCEMIA: ICD-10-CM

## 2019-02-15 DIAGNOSIS — E11.65 UNCONTROLLED TYPE 2 DIABETES MELLITUS WITH HYPERGLYCEMIA (HCC): Primary | ICD-10-CM

## 2019-02-15 DIAGNOSIS — IMO0002 UNCONTROLLED TYPE 2 DIABETES MELLITUS WITH COMPLICATION, WITHOUT LONG-TERM CURRENT USE OF INSULIN: ICD-10-CM

## 2019-02-15 NOTE — TELEPHONE ENCOUNTER
PT IS REQUESTING SOME REFILLS.  HE IS NEEDING HIS LANCETS, STRIPS, AND PEN NEEDLES. HE IS NEEDING THE ASSURE PLATINUM. HE STATES THAT HE WILL NEED THIS TYPE SINCE THAT IS WHAT TYPE OF METER HE HAS.   HE IS ALSO NEEDING HIS SERTRALINE 50 MG, NEBULIZER SOLUTION, BREO, AND FLUTICASONE.   HE ALSO MENTIONED HIS GABAPENTIN. HE STATES THAT HE WAS TAKEN OFF THE GABPENTIN WHEN HE WAS INCARERATED BUT YOU ALL DISCUSSED THIS AND WAS WANTING TO GO BACK ON.  I INFORMED PT THAT YOU WERE OUT OF THE OFFICE AND I WOULD HAVE TO ASK YOU AS IT BEEN AWHILE SINCE YOU REFILLED THESE.

## 2019-02-18 RX ORDER — PEN NEEDLE, DIABETIC 31 G X1/4"
NEEDLE, DISPOSABLE MISCELLANEOUS
Qty: 60 EACH | Refills: 4 | Status: SHIPPED | OUTPATIENT
Start: 2019-02-18 | End: 2020-12-28 | Stop reason: SDUPTHER

## 2019-02-18 RX ORDER — ALBUTEROL SULFATE 1.25 MG/3ML
1 SOLUTION RESPIRATORY (INHALATION) EVERY 6 HOURS PRN
Qty: 120 VIAL | Refills: 0 | Status: SHIPPED | OUTPATIENT
Start: 2019-02-18 | End: 2022-12-01 | Stop reason: SDUPTHER

## 2019-02-18 RX ORDER — SYRING-NEEDL,DISP,INSUL,0.3 ML 30 GX5/16"
SYRINGE, EMPTY DISPOSABLE MISCELLANEOUS
Qty: 100 EACH | Refills: 2 | Status: SHIPPED | OUTPATIENT
Start: 2019-02-18 | End: 2020-12-28 | Stop reason: SDUPTHER

## 2019-02-18 RX ORDER — FLUTICASONE PROPIONATE 50 MCG
1 SPRAY, SUSPENSION (ML) NASAL DAILY
Qty: 1 BOTTLE | Refills: 2 | Status: SHIPPED | OUTPATIENT
Start: 2019-02-18 | End: 2019-07-17 | Stop reason: SDUPTHER

## 2019-02-19 NOTE — TELEPHONE ENCOUNTER
Patient states the Zoloft is 150 mg daily, and she has been off the Gabapentin for 22 months and he doesn't want to restart it

## 2019-02-19 NOTE — PATIENT INSTRUCTIONS
53 YEAR OLD MALE WHO PRESENTS TODAY TO RE-ESTABLISH CARE AFTER 21 MONTH INCARCERATION AND IN FOLLOW UP OF DIABETES, HTN, HYPERLIPIDEMIA, HYPOGONADISM, ELEVATED CPK, OA, ASTHMA, IRINA, AND FATIGUE. BP IS BORDERLINE TODAY. HE WILL CONTINUE ON LOSARTAN 100 MG ONCE DAILY AND LASIX 40 MG DAILY. WE WILL CONTINUE TO MONITOR. HE WILL HAVE FASTING LABS AND CALL IF NO RESULTS IN 1 WEEK. FURTHER RECOMMENDATIONS PENDING LABS AND RECORDS. I HAVE ASKED THAT HE SIGN A MEDICAL RECORDS RELEASE FOR [facility], AND [facility] WHERE HE HAS RECEIVED CARE OVER THE PAST 21 MONTHS. PATIENT WAS PREVIOUSLY SEEING CARDIOLOGY AND ENDOCRINOLOGY- I WILL REFER BACK TODAY.  HE WAS ADVISED THAT THERE WAS A POTENTIAL HE WILL NEED ANOTHER STENT PLACED, HOWEVER, WE WILL WAIT FOR FOLLOW UP TO DETERMINE.      HE WAS SEEING SLEEP MEDICINE AND IS COMPLIANT WITH CPAP. HE WAS ALSO HAVING EVERY 6 MONTH CT CHEST. I WILL REFER BACK TO PULMONOLOGY/ SLEEP MEDICINE FOR FOLLOW UP AND CONTINUED TREATMENT AND RECOMMENDATIONS.      PATIENT WAS SEEING [facility] IN [place] FOR MENTAL HEALTH AND IS RE-ESTABLISHING. THEY ASKED ABOUT GENETIC TESTING FOR MOOD MEDICATIONS, HOWEVER, HE WANTED TO WAIT. HE WAS ALSO CONSIDERING PAIN MANAGEMENT IN [place]. HE LIKED HIS PREVIOUS PAIN MANAGEMENT, HOWEVER, IT WAS A LONG DRIVE.I WILL AWAIT LAB RESULTS AND RECORDS TO CONSIDER REFERRING TO PAIN MANAGEMENT.      PATIENT HAS ALSO BEEN DIAGNOSED WITH CARPAL TUNNEL SYNDROME AND HAS BEEN WEARING BRACES. HE CONTINUES WITH SYMPTOMS- I WILL REFER TO HAND SURGERY. HE ALSO WAS DIAGNOSED WITH PSORIASIS AND STARTED ON METHOTREXATE. I WILL REFER TO DERMATOLOGY.     HE WILL NEED TO FOLLOW UP PENDING LABS, SYMPTOMS, RECORDS, AND SPECIALISTS. FOLLOW UP IN NO MORE THAN 1 MONTH.

## 2019-02-19 NOTE — TELEPHONE ENCOUNTER
I sent in needles, lancets, test strips, nebulizer solution, Flonase, and Zoloft (please verify he is taking 150 mg Zoloft daily- that is the dose listed). We will not be taking over Gabapentin Rx. Please see how long he has been off Gabapentin.

## 2019-02-20 LAB
25(OH)D3+25(OH)D2 SERPL-MCNC: 16.4 NG/ML (ref 30–100)
ALBUMIN SERPL-MCNC: 4.4 G/DL (ref 3.5–5.5)
ALBUMIN/GLOB SERPL: 1.7 {RATIO} (ref 1.2–2.2)
ALP SERPL-CCNC: 68 IU/L (ref 39–117)
ALT SERPL-CCNC: 26 IU/L (ref 0–44)
APPEARANCE UR: CLEAR
AST SERPL-CCNC: 15 IU/L (ref 0–40)
BACTERIA #/AREA URNS HPF: NORMAL /[HPF]
BASOPHILS # BLD AUTO: 0.1 X10E3/UL (ref 0–0.2)
BASOPHILS NFR BLD AUTO: 1 %
BILIRUB SERPL-MCNC: 0.5 MG/DL (ref 0–1.2)
BILIRUB UR QL STRIP: NEGATIVE
BUN SERPL-MCNC: 23 MG/DL (ref 6–24)
BUN/CREAT SERPL: 21 (ref 9–20)
CALCIUM SERPL-MCNC: 9.5 MG/DL (ref 8.7–10.2)
CHLORIDE SERPL-SCNC: 100 MMOL/L (ref 96–106)
CHOLEST SERPL-MCNC: 154 MG/DL (ref 100–199)
CK SERPL-CCNC: 58 U/L (ref 24–204)
CO2 SERPL-SCNC: 24 MMOL/L (ref 20–29)
COLOR UR: YELLOW
CREAT SERPL-MCNC: 1.07 MG/DL (ref 0.76–1.27)
EOSINOPHIL # BLD AUTO: 0.5 X10E3/UL (ref 0–0.4)
EOSINOPHIL NFR BLD AUTO: 5 %
EPI CELLS #/AREA URNS HPF: NORMAL /HPF
ERYTHROCYTE [DISTWIDTH] IN BLOOD BY AUTOMATED COUNT: 15.7 % (ref 12.3–15.4)
FOLATE SERPL-MCNC: 8.9 NG/ML
FSH SERPL-ACNC: 2 MIU/ML (ref 1.5–12.4)
FT4I SERPL CALC-MCNC: 1.8 (ref 1.2–4.9)
GLOBULIN SER CALC-MCNC: 2.6 G/DL (ref 1.5–4.5)
GLUCOSE SERPL-MCNC: 191 MG/DL (ref 65–99)
GLUCOSE UR QL: NEGATIVE
HCT VFR BLD AUTO: 42.3 % (ref 37.5–51)
HDLC SERPL-MCNC: 39 MG/DL
HGB BLD-MCNC: 13.6 G/DL (ref 13–17.7)
HGB UR QL STRIP: NEGATIVE
IMM GRANULOCYTES # BLD AUTO: 0 X10E3/UL (ref 0–0.1)
IMM GRANULOCYTES NFR BLD AUTO: 0 %
KETONES UR QL STRIP: NEGATIVE
LDLC SERPL CALC-MCNC: 73 MG/DL (ref 0–99)
LDLC/HDLC SERPL: 1.9 RATIO (ref 0–3.6)
LEUKOCYTE ESTERASE UR QL STRIP: NEGATIVE
LH SERPL-ACNC: 3.9 MIU/ML (ref 1.7–8.6)
LYMPHOCYTES # BLD AUTO: 2.8 X10E3/UL (ref 0.7–3.1)
LYMPHOCYTES NFR BLD AUTO: 28 %
MCH RBC QN AUTO: 29.2 PG (ref 26.6–33)
MCHC RBC AUTO-ENTMCNC: 32.2 G/DL (ref 31.5–35.7)
MCV RBC AUTO: 91 FL (ref 79–97)
MICRO URNS: (no result)
MICRO URNS: (no result)
MONOCYTES # BLD AUTO: 0.8 X10E3/UL (ref 0.1–0.9)
MONOCYTES NFR BLD AUTO: 8 %
MUCOUS THREADS URNS QL MICRO: PRESENT
NEUTROPHILS # BLD AUTO: 5.9 X10E3/UL (ref 1.4–7)
NEUTROPHILS NFR BLD AUTO: 58 %
NITRITE UR QL STRIP: NEGATIVE
PH UR STRIP: 5.5 [PH] (ref 5–7.5)
PLATELET # BLD AUTO: 374 X10E3/UL (ref 150–379)
POTASSIUM SERPL-SCNC: 4.5 MMOL/L (ref 3.5–5.2)
PROT SERPL-MCNC: 7 G/DL (ref 6–8.5)
PROT UR QL STRIP: NEGATIVE
PSA SERPL-MCNC: 1 NG/ML (ref 0–4)
RBC # BLD AUTO: 4.66 X10E6/UL (ref 4.14–5.8)
RBC #/AREA URNS HPF: NORMAL /HPF
SODIUM SERPL-SCNC: 140 MMOL/L (ref 134–144)
SP GR UR: 1.03 (ref 1–1.03)
T3RU NFR SERPL: 25 % (ref 24–39)
T4 SERPL-MCNC: 7.2 UG/DL (ref 4.5–12)
TESTOST FREE SERPL-MCNC: 8 PG/ML (ref 7.2–24)
TESTOST SERPL-MCNC: 285 NG/DL (ref 264–916)
TRIGL SERPL-MCNC: 209 MG/DL (ref 0–149)
TSH SERPL DL<=0.005 MIU/L-ACNC: 2.29 UIU/ML (ref 0.45–4.5)
UROBILINOGEN UR STRIP-MCNC: 0.2 MG/DL (ref 0.2–1)
VIT B12 SERPL-MCNC: 307 PG/ML (ref 232–1245)
VLDLC SERPL CALC-MCNC: 42 MG/DL (ref 5–40)
WBC # BLD AUTO: 10 X10E3/UL (ref 3.4–10.8)
WBC #/AREA URNS HPF: NORMAL /HPF

## 2019-02-21 ENCOUNTER — OFFICE VISIT (OUTPATIENT)
Dept: CARDIOLOGY | Facility: CLINIC | Age: 54
End: 2019-02-21

## 2019-02-21 VITALS
BODY MASS INDEX: 41.72 KG/M2 | HEART RATE: 55 BPM | WEIGHT: 298 LBS | SYSTOLIC BLOOD PRESSURE: 130 MMHG | HEIGHT: 71 IN | DIASTOLIC BLOOD PRESSURE: 92 MMHG

## 2019-02-21 DIAGNOSIS — E11.65 UNCONTROLLED TYPE 2 DIABETES MELLITUS WITH HYPERGLYCEMIA (HCC): ICD-10-CM

## 2019-02-21 DIAGNOSIS — E78.2 MIXED HYPERLIPIDEMIA: Primary | ICD-10-CM

## 2019-02-21 DIAGNOSIS — I25.10 CORONARY ARTERY DISEASE INVOLVING NATIVE CORONARY ARTERY OF NATIVE HEART WITHOUT ANGINA PECTORIS: ICD-10-CM

## 2019-02-21 PROCEDURE — 93000 ELECTROCARDIOGRAM COMPLETE: CPT | Performed by: INTERNAL MEDICINE

## 2019-02-21 PROCEDURE — 99214 OFFICE O/P EST MOD 30 MIN: CPT | Performed by: INTERNAL MEDICINE

## 2019-02-21 NOTE — PROGRESS NOTES
Azael Carmen  1965  Date of Office Visit: 02/21/2019  Encounter Provider: Gerard Rucker MD  Place of Service: [facility]      CHIEF COMPLAINT:  Coronary artery disease with prior PCI of the RCA  Chronic total occlusion of the circumflex  Essential hypertension  Hyperlipidemia     HISTORY OF PRESENT ILLNESS:Dear Ms. Lacey,    I had the pleasure of seeing your patient in consultation today. As you know, he is a very pleasant 53-year-old male who was recently incarcerated and subsequently has been released. He has a history of coronary disease with prior PCI to the RCA with a 3.5 mm bioabsorbable stent, that was post-dilated to 4 mm in diameter, preserved ejection fraction, chronic total occlusion of the circumflex, hypertension, hyperlipidemia, musculoskeletal pain and diabetes mellitus. He presents to me to establish care. He states that he has occasionally had some chest discomfort, however, denies chest pain with ambulation or activity. He has been very inactive in senior care and certainly is picking up his activity level now. His main complaint is mild to moderate dyspnea on exertion. He has previously undergone his coronary angiographies back in 2016 but also a stress test late 2018 with evidence of a lateral and inferolateral infarction. There was a very small area of inferior wall daphney-infarct ischemia.     He has no orthopnea or PND.     Review of Systems   Constitution: Negative for fever, weakness and malaise/fatigue.   HENT: Negative for nosebleeds and sore throat.    Eyes: Negative for blurred vision and double vision.   Cardiovascular: Positive for chest pain and dyspnea on exertion. Negative for claudication, palpitations and syncope.   Respiratory: Negative for cough, shortness of breath and snoring.    Endocrine: Negative for cold intolerance, heat intolerance and polydipsia.   Skin: Negative for itching, poor wound healing and rash.   Musculoskeletal:  Positive for joint pain. Negative for joint swelling, muscle weakness and myalgias.   Gastrointestinal: Negative for abdominal pain, melena, nausea and vomiting.   Neurological: Negative for light-headedness, loss of balance, seizures and vertigo.   Psychiatric/Behavioral: Negative for altered mental status and depression.          Past Medical History:   Diagnosis Date   • Allergic rhinitis    • Anxiety    • Arthritis    • Asthma    • Chest pain in adult    • COPD (chronic obstructive pulmonary disease) (CMS/[facility])    • Depression    • Diabetes mellitus (CMS/[facility])    • Erectile dysfunction    • GERD (gastroesophageal reflux disease)    • Heart murmur    • Hypertension    • Mixed hyperlipidemia 7/19/2016   • Sleep apnea        The following portions of the patient's history were reviewed and updated as appropriate: Social history , Family history and Surgical history     Current Outpatient Medications on File Prior to Visit   Medication Sig Dispense Refill   • albuterol (ACCUNEB) 1.25 MG/3ML nebulizer solution Take 3 mL by nebulization Every 6 (Six) Hours As Needed for Wheezing. 120 vial 0   • aspirin 81 MG EC tablet Take 81 mg by mouth daily.     • atorvastatin (LIPITOR) 20 MG tablet Take 1 tablet by mouth Daily. (Patient taking differently: Take 40 mg by mouth Daily.) 30 tablet 2   • fluticasone (FLONASE) 50 MCG/ACT nasal spray 1 spray into the nostril(s) as directed by provider Daily. 1 bottle 2   • Fluticasone Furoate-Vilanterol (BREO ELLIPTA) 100-25 MCG/INH inhaler 1 PUFF ONCE DAILY THEN RINSE MOUTH 1 each 0   • furosemide (LASIX) 40 MG tablet Take 40 mg by mouth 2 (Two) Times a Day.     • gabapentin (NEURONTIN) 800 MG tablet Take 800 mg by mouth 3 (Three) Times a Day.     • glipiZIDE (GLUCOTROL XL) 10 MG 24 hr tablet Take 10 mg by mouth Daily.     • glucose blood (ASSURE PLATINUM) test strip Use as instructed 100 each 12   • hydrOXYzine (ATARAX) 50 MG tablet Take 50 mg by mouth 3 (Three) Times a Day As Needed for  "Itching.     • insulin NPH-insulin regular (humuLIN 70/30,novoLIN 70/30) (70-30) 100 UNIT/ML injection Inject 30 Units under the skin into the appropriate area as directed 2 (Two) Times a Day With Meals.     • Insulin Pen Needle (PEN NEEDLES) 31G X 6 MM misc One inj daily 60 each 4   • insulin regular (humuLIN R,novoLIN R) 100 UNIT/ML injection Inject  under the skin into the appropriate area as directed 3 (Three) Times a Day Before Meals.     • Lancet Device misc Test glucose 3-4 x daily 100 each 2   • Levalbuterol Tartrate (XOPENEX HFA IN) Inhale 2 puffs 2 (Two) Times a Day As Needed.     • losartan (COZAAR) 100 MG tablet Take 100 mg by mouth Daily.     • metFORMIN (GLUCOPHAGE) 850 MG tablet Take 850 mg by mouth 3 (Three) Times a Day.     • methotrexate 2.5 MG tablet Take  by mouth 3 (Three) Doses Each Week. Take Doses 12 (Twelve) Hours Apart. 6 tablets once a week     • Multiple Vitamins-Minerals (V-R COMPLETE SENIOR) tablet Take 1 tablet by mouth daily.     • potassium chloride (K-DUR,KLOR-CON) 10 MEQ CR tablet Take 10 mEq by mouth 2 (Two) Times a Day.     • sertraline (ZOLOFT) 50 MG tablet Take 3 tablets by mouth Daily. 90 tablet 2   • tamsulosin (FLOMAX) 0.4 MG capsule 24 hr capsule Take 1 capsule by mouth Daily.     • ticagrelor (BRILINTA) 90 MG tablet tablet Take 1 tablet by mouth 2 (Two) Times a Day. 120 tablet 6     No current facility-administered medications on file prior to visit.        Allergies   Allergen Reactions   • Cymbalta [Duloxetine Hcl] Hives   • Penicillins Rash       Vitals:    02/21/19 0936   BP: 130/92   Pulse: 55   Weight: 135 kg (298 lb)   Height: 180.3 cm (71\")     Physical Exam   Constitutional: He is oriented to person, place, and time. He appears well-developed and well-nourished.   HENT:   Head: Normocephalic and atraumatic.   Eyes: Conjunctivae and EOM are normal. No scleral icterus.   Neck: Normal range of motion. Neck supple. Normal carotid pulses, no hepatojugular reflux and no " JVD present. Carotid bruit is not present. No tracheal deviation present. No thyromegaly present.   Cardiovascular: Normal rate and regular rhythm. Exam reveals no gallop and no friction rub.   No murmur heard.  Pulses:       Carotid pulses are 2+ on the right side, and 2+ on the left side.       Radial pulses are 2+ on the right side, and 2+ on the left side.        Femoral pulses are 2+ on the right side, and 2+ on the left side.       Dorsalis pedis pulses are 2+ on the right side, and 2+ on the left side.        Posterior tibial pulses are 2+ on the right side, and 2+ on the left side.   Pulmonary/Chest: Breath sounds normal. No respiratory distress. He has no decreased breath sounds. He has no wheezes. He has no rhonchi. He has no rales. He exhibits no tenderness.   Abdominal: Soft. Bowel sounds are normal. He exhibits no distension. There is no tenderness. There is no rebound.   Musculoskeletal: He exhibits no edema or deformity.   Neurological: He is alert and oriented to person, place, and time. He has normal strength. No sensory deficit.   Skin: No rash noted. No erythema.   Psychiatric: He has a normal mood and affect. His behavior is normal.           No components found for: CBC  No results found for: CMP  No components found for: LIPID  No results found for: BMP      ECG 12 Lead  Date/Time: 2/21/2019 10:01 AM  Performed by: Gerard Rucker MD  Authorized by: Gerard Rucker MD   Comparison: compared with previous ECG from 2/14/2019  Similar to previous ECG  Rhythm: sinus rhythm  Rate: normal  QRS axis: normal    Clinical impression: non-specific ECG  Comments: Nonspecific T wave abnormalities inferior leads            DISCUSSION/SUMMARYVery pleasant 53-year-old male with medical history of coronary artery disease, prior PCI to the RCA with a bioabsorbable stent in 2016, chronic total occlusion of the circumflex, hypertension, hyperlipidemia, diabetes mellitus, and musculoskeletal pain who  presents to me to establish care. He has occasionally had some chest discomfort; however, he has no chest pain with activity or symptoms that are consistent with significant angina. He does have mild to moderate dyspnea on exertion that is more likely secondary to his deconditioning, obesity and lung disease. He has no evidence of heart failure clinically.     1. Coronary artery disease with prior PCI to the RCA.  - of the circumflex.  -No significant angina on my review of his symptoms.   -Continue aspirin 81 mg daily lifelong.   -Stop ticagrelor as it has been greater than 2 years from his stent.  -Not on beta blocker secondary to resting bradycardia.  -Continue statin therapy. His lipid panel was recently performed with an LDL of 73. This is acceptable.   2. Essential hypertension: Reasonably controlled. Continue current therapy.   3. Diabetes mellitus: Per primary.     I will plan on seeing him back in 6 months or earlier with problems.     Coronary Artery Disease  Assessment  • The patient has no angina    Plan  • Lifestyle modifications discussed include adhering to a heart healthy diet, avoidance of tobacco products, medication compliance and regular exercise    Subjective - Objective  • There has been a previous stent procedure using NIC  • Current antiplatelet therapy includes aspirin 81 mg and ticagrelor 90 mg

## 2019-02-26 ENCOUNTER — TRANSCRIBE ORDERS (OUTPATIENT)
Dept: ADMINISTRATIVE | Facility: HOSPITAL | Age: 54
End: 2019-02-26

## 2019-02-26 DIAGNOSIS — J44.9 CHRONIC OBSTRUCTIVE PULMONARY DISEASE, UNSPECIFIED COPD TYPE (HCC): ICD-10-CM

## 2019-02-26 DIAGNOSIS — R93.89 ABNORMAL CXR: Primary | ICD-10-CM

## 2019-02-26 DIAGNOSIS — J45.909 ASTHMA IN ADULT, UNSPECIFIED ASTHMA SEVERITY, UNSPECIFIED WHETHER COMPLICATED, UNSPECIFIED WHETHER PERSISTENT: ICD-10-CM

## 2019-02-27 RX ORDER — TAMSULOSIN HYDROCHLORIDE 0.4 MG/1
1 CAPSULE ORAL DAILY
Qty: 30 CAPSULE | Refills: 0 | Status: SHIPPED | OUTPATIENT
Start: 2019-02-27 | End: 2019-04-05 | Stop reason: SDUPTHER

## 2019-02-27 RX ORDER — ATORVASTATIN CALCIUM 20 MG/1
20 TABLET, FILM COATED ORAL DAILY
Qty: 30 TABLET | Refills: 1 | Status: SHIPPED | OUTPATIENT
Start: 2019-02-27 | End: 2019-04-20 | Stop reason: SDUPTHER

## 2019-02-27 RX ORDER — LOSARTAN POTASSIUM 100 MG/1
100 TABLET ORAL DAILY
Qty: 30 TABLET | Refills: 1 | Status: SHIPPED | OUTPATIENT
Start: 2019-02-27 | End: 2019-04-20 | Stop reason: SDUPTHER

## 2019-02-27 RX ORDER — ERGOCALCIFEROL 1.25 MG/1
50000 CAPSULE ORAL
Qty: 4 CAPSULE | Refills: 4 | Status: SHIPPED | OUTPATIENT
Start: 2019-02-27 | End: 2019-07-08 | Stop reason: SDUPTHER

## 2019-02-27 RX ORDER — MULTIVIT WITH IRON,MINERALS
1 TABLET ORAL DAILY
Qty: 90 EACH | Refills: 0 | Status: SHIPPED | OUTPATIENT
Start: 2019-02-27 | End: 2019-05-18 | Stop reason: SDUPTHER

## 2019-02-27 RX ORDER — POTASSIUM CHLORIDE 750 MG/1
10 TABLET, EXTENDED RELEASE ORAL 2 TIMES DAILY
Qty: 60 TABLET | Refills: 0 | Status: SHIPPED | OUTPATIENT
Start: 2019-02-27 | End: 2019-04-05 | Stop reason: SDUPTHER

## 2019-02-27 RX ORDER — HYDROXYZINE 50 MG/1
50 TABLET, FILM COATED ORAL NIGHTLY
Qty: 30 TABLET | Refills: 0 | Status: SHIPPED | OUTPATIENT
Start: 2019-02-27 | End: 2022-12-19 | Stop reason: ALTCHOICE

## 2019-03-14 ENCOUNTER — OFFICE VISIT (OUTPATIENT)
Dept: ENDOCRINOLOGY | Age: 54
End: 2019-03-14

## 2019-03-14 VITALS
RESPIRATION RATE: 16 BRPM | BODY MASS INDEX: 41.8 KG/M2 | HEIGHT: 71 IN | WEIGHT: 298.6 LBS | DIASTOLIC BLOOD PRESSURE: 76 MMHG | SYSTOLIC BLOOD PRESSURE: 122 MMHG

## 2019-03-14 DIAGNOSIS — E55.9 VITAMIN D DEFICIENCY: ICD-10-CM

## 2019-03-14 DIAGNOSIS — E66.01 CLASS 3 SEVERE OBESITY WITHOUT SERIOUS COMORBIDITY WITH BODY MASS INDEX (BMI) OF 40.0 TO 44.9 IN ADULT, UNSPECIFIED OBESITY TYPE (HCC): ICD-10-CM

## 2019-03-14 DIAGNOSIS — E29.1 HYPOGONADISM IN MALE: ICD-10-CM

## 2019-03-14 DIAGNOSIS — E11.65 UNCONTROLLED TYPE 2 DIABETES MELLITUS WITH HYPERGLYCEMIA (HCC): Primary | ICD-10-CM

## 2019-03-14 DIAGNOSIS — IMO0002 UNCONTROLLED DIABETES MELLITUS: ICD-10-CM

## 2019-03-14 DIAGNOSIS — I25.10 CORONARY ARTERY DISEASE INVOLVING NATIVE CORONARY ARTERY OF NATIVE HEART WITHOUT ANGINA PECTORIS: ICD-10-CM

## 2019-03-14 DIAGNOSIS — N52.9 ED (ERECTILE DYSFUNCTION) OF ORGANIC ORIGIN: ICD-10-CM

## 2019-03-14 DIAGNOSIS — R53.82 CHRONIC FATIGUE: ICD-10-CM

## 2019-03-14 DIAGNOSIS — E78.2 MIXED HYPERLIPIDEMIA: ICD-10-CM

## 2019-03-14 PROCEDURE — 99215 OFFICE O/P EST HI 40 MIN: CPT | Performed by: INTERNAL MEDICINE

## 2019-03-14 RX ORDER — BROMOCRIPTINE MESYLATE 0.8 MG/1
6 TABLET ORAL
Qty: 180 TABLET | Refills: 5 | Status: SHIPPED | OUTPATIENT
Start: 2019-03-14 | End: 2019-05-09

## 2019-03-14 RX ORDER — EMPAGLIFLOZIN, METFORMIN HYDROCHLORIDE 12.5; 1 MG/1; MG/1
2 TABLET, EXTENDED RELEASE ORAL
Qty: 60 TABLET | Refills: 5 | Status: SHIPPED | OUTPATIENT
Start: 2019-03-14 | End: 2019-03-21

## 2019-03-14 RX ORDER — MONTELUKAST SODIUM 10 MG/1
10 TABLET ORAL DAILY
COMMUNITY
End: 2021-01-06 | Stop reason: SDUPTHER

## 2019-03-14 RX ORDER — INSULIN GLARGINE 300 U/ML
100 INJECTION, SOLUTION SUBCUTANEOUS
Qty: 10 PEN | Refills: 3 | Status: SHIPPED | OUTPATIENT
Start: 2019-03-14 | End: 2019-03-18 | Stop reason: CLARIF

## 2019-03-14 RX ORDER — DULAGLUTIDE 1.5 MG/.5ML
1.5 INJECTION, SOLUTION SUBCUTANEOUS WEEKLY
Qty: 4 PEN | Refills: 5 | Status: SHIPPED | OUTPATIENT
Start: 2019-03-14 | End: 2019-07-16 | Stop reason: SDUPTHER

## 2019-03-14 RX ORDER — ASPIRIN 81 MG/1
TABLET ORAL
Qty: 30 TABLET | Refills: 11 | Status: SHIPPED | OUTPATIENT
Start: 2019-03-14 | End: 2019-05-09

## 2019-03-14 RX ORDER — LANOLIN ALCOHOL/MO/W.PET/CERES
1000 CREAM (GRAM) TOPICAL DAILY
COMMUNITY
End: 2019-07-29 | Stop reason: SDUPTHER

## 2019-03-14 NOTE — PROGRESS NOTES
"Subjective   Azael Carmen is a 53 y.o. male seen as a new patient for DM2. He is checking BG 3-4 times a day. He states that he would like to get taken off insulin due to it causing his BG to drop too low. He states that when he takes his injections it causes his BG to drop between 40-60. He was on Januvia in the past and states that it was working well for him. He denies any other problems or concerns.     History of Present Illness this is a 53-year-old gentleman who has been referred for further evaluation and treatment of poorly controlled type 2 diabetes.  He said he has had diabetes between 15-20 years and currently is on glipizide 10 mg daily as well as metformin 850 mg 3 times daily and Humulin 7030 30 units twice daily and finally Humulin or based on a sliding scale roughly about 30-50 mg.  He said he has had multiple hypoglycemia office and that plus the fact that he has many disabilities including his knee problems and having spent 21 months in incarceration all has contributed to his weight gain and having a hard time losing weight.  He is  however has 3 grown children who apparently are pretty healthy.    /76   Resp 16   Ht 180.3 cm (71\")   Wt 135 kg (298 lb 9.6 oz)   BMI 41.65 kg/m²      Allergies   Allergen Reactions   • Cymbalta [Duloxetine Hcl] Hives   • Penicillins Rash       Current Outpatient Medications:   •  albuterol (ACCUNEB) 1.25 MG/3ML nebulizer solution, Take 3 mL by nebulization Every 6 (Six) Hours As Needed for Wheezing., Disp: 120 vial, Rfl: 0  •  aspirin 81 MG EC tablet, Take 81 mg by mouth daily., Disp: , Rfl:   •  atorvastatin (LIPITOR) 20 MG tablet, Take 1 tablet by mouth Daily., Disp: 30 tablet, Rfl: 1  •  fluticasone (FLONASE) 50 MCG/ACT nasal spray, 1 spray into the nostril(s) as directed by provider Daily., Disp: 1 bottle, Rfl: 2  •  Fluticasone Furoate-Vilanterol (BREO ELLIPTA) 100-25 MCG/INH inhaler, 1 PUFF ONCE DAILY THEN RINSE MOUTH, Disp: 1 each, Rfl: " 0  •  furosemide (LASIX) 40 MG tablet, Take 40 mg by mouth 2 (Two) Times a Day., Disp: , Rfl:   •  glipiZIDE (GLUCOTROL XL) 10 MG 24 hr tablet, Take 10 mg by mouth Daily., Disp: , Rfl:   •  glucose blood (ASSURE PLATINUM) test strip, Use as instructed, Disp: 100 each, Rfl: 12  •  hydrOXYzine (ATARAX) 50 MG tablet, Take 1 tablet by mouth Every Night., Disp: 30 tablet, Rfl: 0  •  insulin NPH-insulin regular (humuLIN 70/30,novoLIN 70/30) (70-30) 100 UNIT/ML injection, Inject 30 Units under the skin into the appropriate area as directed 2 (Two) Times a Day With Meals., Disp: , Rfl:   •  Insulin Pen Needle (PEN NEEDLES) 31G X 6 MM misc, One inj daily, Disp: 60 each, Rfl: 4  •  insulin regular (humuLIN R,novoLIN R) 100 UNIT/ML injection, Inject  under the skin into the appropriate area as directed 3 (Three) Times a Day Before Meals., Disp: , Rfl:   •  Lancet Device misc, Test glucose 3-4 x daily, Disp: 100 each, Rfl: 2  •  Levalbuterol Tartrate (XOPENEX HFA IN), Inhale 2 puffs 2 (Two) Times a Day As Needed., Disp: , Rfl:   •  losartan (COZAAR) 100 MG tablet, Take 1 tablet by mouth Daily., Disp: 30 tablet, Rfl: 1  •  metFORMIN (GLUCOPHAGE) 850 MG tablet, Take 850 mg by mouth 3 (Three) Times a Day., Disp: , Rfl:   •  methotrexate 2.5 MG tablet, Take  by mouth 3 (Three) Doses Each Week. Take Doses 12 (Twelve) Hours Apart. 6 tablets once a week, Disp: , Rfl:   •  montelukast (SINGULAIR) 10 MG tablet, Take 10 mg by mouth Every Night., Disp: , Rfl:   •  Multiple Vitamins-Minerals (V-R COMPLETE SENIOR) tablet, Take 1 tablet by mouth Daily., Disp: 90 each, Rfl: 0  •  potassium chloride (K-DUR,KLOR-CON) 10 MEQ CR tablet, Take 1 tablet by mouth 2 (Two) Times a Day., Disp: 60 tablet, Rfl: 0  •  sertraline (ZOLOFT) 50 MG tablet, Take 3 tablets by mouth Daily., Disp: 90 tablet, Rfl: 2  •  tamsulosin (FLOMAX) 0.4 MG capsule 24 hr capsule, Take 1 capsule by mouth Daily., Disp: 30 capsule, Rfl: 0  •  ticagrelor (BRILINTA) 90 MG tablet  tablet, Take 1 tablet by mouth 2 (Two) Times a Day., Disp: 120 tablet, Rfl: 6  •  vitamin B-12 (CYANOCOBALAMIN) 1000 MCG tablet, Take 1,000 mcg by mouth Daily., Disp: , Rfl:   •  vitamin D (ERGOCALCIFEROL) 79120 units capsule capsule, Take 1 capsule by mouth Every 7 (Seven) Days., Disp: 4 capsule, Rfl: 4      The following portions of the patient's history were reviewed and updated as appropriate: allergies, current medications, past family history, past medical history, past social history, past surgical history and problem list.    Review of Systems   Constitutional: Negative.    HENT: Negative.    Eyes: Negative.    Respiratory: Negative.    Cardiovascular: Negative.    Gastrointestinal: Negative.    Endocrine: Negative.    Genitourinary: Negative.    Musculoskeletal: Negative.    Skin: Negative.    Allergic/Immunologic: Negative.    Neurological: Negative.    Hematological: Negative.    Psychiatric/Behavioral: Negative.        Objective   Physical Exam   Constitutional: He is oriented to person, place, and time. He appears well-developed and well-nourished. No distress.   HENT:   Head: Normocephalic and atraumatic.   Right Ear: External ear normal.   Left Ear: External ear normal.   Nose: Nose normal.   Mouth/Throat: Oropharynx is clear and moist. No oropharyngeal exudate.   Eyes: Conjunctivae and EOM are normal. Pupils are equal, round, and reactive to light. Right eye exhibits no discharge. Left eye exhibits no discharge. No scleral icterus.   Neck: Normal range of motion. Neck supple. No JVD present. Carotid bruit is not present. No tracheal deviation present. No thyroid mass and no thyromegaly present.   Cardiovascular: Normal rate, regular rhythm, normal heart sounds and intact distal pulses. Exam reveals no gallop and no friction rub.   No murmur heard.  Pulmonary/Chest: Effort normal and breath sounds normal. No stridor. No respiratory distress. He has no wheezes. He has no rales. He exhibits no  tenderness.   Abdominal: Soft. Bowel sounds are normal. He exhibits no distension and no mass. There is no tenderness. There is no rebound and no guarding. No hernia.   Musculoskeletal: Normal range of motion. He exhibits no edema, tenderness or deformity.   Examination of both feet reveals normal configuration without any deformities or ulcerations or skin break or calluses.   Lymphadenopathy:     He has no cervical adenopathy.   Neurological: He is alert and oriented to person, place, and time. He displays normal reflexes. No cranial nerve deficit or sensory deficit. He exhibits normal muscle tone. Coordination and gait normal.   Skin: Skin is warm and dry. No rash noted. He is not diaphoretic. No erythema. No pallor.   Psychiatric: He has a normal mood and affect. His behavior is normal. Judgment and thought content normal.   Nursing note and vitals reviewed.        Assessment/Plan   Diagnoses and all orders for this visit:    Uncontrolled type 2 diabetes mellitus with hyperglycemia (CMS/[facility])  -     T3, Free  -     T4 & TSH ([facility])  -     T4, Free  -     TestT+TestF+SHBG  -     Uric Acid  -     Vitamin D 25 Hydroxy  -     Comprehensive Metabolic Panel  -     C-Peptide  -     Follicle Stimulating Hormone  -     Hemoglobin A1c  -     Luteinizing Hormone  -     MicroAlbumin, Urine, Random - Urine, Clean Catch  -     Prolactin  -     ACTH  -     Cortisol  -     Calcium, Ionized  -     PTH, Intact  -     Phosphorus  -     NMR LipoProfile  -     T4 & TSH ([facility]); Future  -     TestT+TestF+SHBG; Future  -     Uric Acid; Future  -     Vitamin D 25 Hydroxy; Future  -     Comprehensive Metabolic Panel; Future  -     C-Peptide; Future  -     Hemoglobin A1c; Future  -     Lipid Panel; Future  -     Hemoglobin & Hematocrit, Blood; Future    Hypogonadism in male  -     T3, Free  -     T4 & TSH ([facility])  -     T4, Free  -     TestT+TestF+SHBG  -     Uric Acid  -     Vitamin D 25 Hydroxy  -     Comprehensive Metabolic  Panel  -     C-Peptide  -     Follicle Stimulating Hormone  -     Hemoglobin A1c  -     Luteinizing Hormone  -     MicroAlbumin, Urine, Random - Urine, Clean Catch  -     Prolactin  -     ACTH  -     Cortisol  -     Calcium, Ionized  -     PTH, Intact  -     Phosphorus  -     NMR LipoProfile  -     T4 & TSH ([facility]); Future  -     TestT+TestF+SHBG; Future  -     Uric Acid; Future  -     Vitamin D 25 Hydroxy; Future  -     Comprehensive Metabolic Panel; Future  -     C-Peptide; Future  -     Hemoglobin A1c; Future  -     Lipid Panel; Future  -     Hemoglobin & Hematocrit, Blood; Future    Mixed hyperlipidemia  -     T3, Free  -     T4 & TSH ([facility])  -     T4, Free  -     TestT+TestF+SHBG  -     Uric Acid  -     Vitamin D 25 Hydroxy  -     Comprehensive Metabolic Panel  -     C-Peptide  -     Follicle Stimulating Hormone  -     Hemoglobin A1c  -     Luteinizing Hormone  -     MicroAlbumin, Urine, Random - Urine, Clean Catch  -     Prolactin  -     ACTH  -     Cortisol  -     Calcium, Ionized  -     PTH, Intact  -     Phosphorus  -     NMR LipoProfile  -     T4 & TSH ([facility]); Future  -     TestT+TestF+SHBG; Future  -     Uric Acid; Future  -     Vitamin D 25 Hydroxy; Future  -     Comprehensive Metabolic Panel; Future  -     C-Peptide; Future  -     Hemoglobin A1c; Future  -     Lipid Panel; Future  -     Hemoglobin & Hematocrit, Blood; Future    Coronary artery disease involving native coronary artery of native heart without angina pectoris  -     T3, Free  -     T4 & TSH ([facility])  -     T4, Free  -     TestT+TestF+SHBG  -     Uric Acid  -     Vitamin D 25 Hydroxy  -     Comprehensive Metabolic Panel  -     C-Peptide  -     Follicle Stimulating Hormone  -     Hemoglobin A1c  -     Luteinizing Hormone  -     MicroAlbumin, Urine, Random - Urine, Clean Catch  -     Prolactin  -     ACTH  -     Cortisol  -     Calcium, Ionized  -     PTH, Intact  -     Phosphorus  -     NMR LipoProfile  -     T4 & TSH ([facility]);  Future  -     TestT+TestF+SHBG; Future  -     Uric Acid; Future  -     Vitamin D 25 Hydroxy; Future  -     Comprehensive Metabolic Panel; Future  -     C-Peptide; Future  -     Hemoglobin A1c; Future  -     Lipid Panel; Future  -     Hemoglobin & Hematocrit, Blood; Future    ED (erectile dysfunction) of organic origin  -     T3, Free  -     T4 & TSH ([facility])  -     T4, Free  -     TestT+TestF+SHBG  -     Uric Acid  -     Vitamin D 25 Hydroxy  -     Comprehensive Metabolic Panel  -     C-Peptide  -     Follicle Stimulating Hormone  -     Hemoglobin A1c  -     Luteinizing Hormone  -     MicroAlbumin, Urine, Random - Urine, Clean Catch  -     Prolactin  -     ACTH  -     Cortisol  -     Calcium, Ionized  -     PTH, Intact  -     Phosphorus  -     NMR LipoProfile  -     T4 & TSH ([facility]); Future  -     TestT+TestF+SHBG; Future  -     Uric Acid; Future  -     Vitamin D 25 Hydroxy; Future  -     Comprehensive Metabolic Panel; Future  -     C-Peptide; Future  -     Hemoglobin A1c; Future  -     Lipid Panel; Future  -     Hemoglobin & Hematocrit, Blood; Future    Chronic fatigue  -     T3, Free  -     T4 & TSH ([facility])  -     T4, Free  -     TestT+TestF+SHBG  -     Uric Acid  -     Vitamin D 25 Hydroxy  -     Comprehensive Metabolic Panel  -     C-Peptide  -     Follicle Stimulating Hormone  -     Hemoglobin A1c  -     Luteinizing Hormone  -     MicroAlbumin, Urine, Random - Urine, Clean Catch  -     Prolactin  -     ACTH  -     Cortisol  -     Calcium, Ionized  -     PTH, Intact  -     Phosphorus  -     NMR LipoProfile  -     T4 & TSH ([facility]); Future  -     TestT+TestF+SHBG; Future  -     Uric Acid; Future  -     Vitamin D 25 Hydroxy; Future  -     Comprehensive Metabolic Panel; Future  -     C-Peptide; Future  -     Hemoglobin A1c; Future  -     Lipid Panel; Future  -     Hemoglobin & Hematocrit, Blood; Future    Vitamin D deficiency  -     T3, Free  -     T4 & TSH ([facility])  -     T4, Free  -     TestT+TestF+SHBG  -      Uric Acid  -     Vitamin D 25 Hydroxy  -     Comprehensive Metabolic Panel  -     C-Peptide  -     Follicle Stimulating Hormone  -     Hemoglobin A1c  -     Luteinizing Hormone  -     MicroAlbumin, Urine, Random - Urine, Clean Catch  -     Prolactin  -     ACTH  -     Cortisol  -     Calcium, Ionized  -     PTH, Intact  -     Phosphorus  -     NMR LipoProfile  -     T4 & TSH ([facility]); Future  -     TestT+TestF+SHBG; Future  -     Uric Acid; Future  -     Vitamin D 25 Hydroxy; Future  -     Comprehensive Metabolic Panel; Future  -     C-Peptide; Future  -     Hemoglobin A1c; Future  -     Lipid Panel; Future  -     Hemoglobin & Hematocrit, Blood; Future    Class 3 severe obesity without serious comorbidity with body mass index (BMI) of 40.0 to 44.9 in adult, unspecified obesity type (CMS/[facility])  -     T3, Free  -     T4 & TSH ([facility])  -     T4, Free  -     TestT+TestF+SHBG  -     Uric Acid  -     Vitamin D 25 Hydroxy  -     Comprehensive Metabolic Panel  -     C-Peptide  -     Follicle Stimulating Hormone  -     Hemoglobin A1c  -     Luteinizing Hormone  -     MicroAlbumin, Urine, Random - Urine, Clean Catch  -     Prolactin  -     ACTH  -     Cortisol  -     Calcium, Ionized  -     PTH, Intact  -     Phosphorus  -     NMR LipoProfile  -     T4 & TSH ([facility]); Future  -     TestT+TestF+SHBG; Future  -     Uric Acid; Future  -     Vitamin D 25 Hydroxy; Future  -     Comprehensive Metabolic Panel; Future  -     C-Peptide; Future  -     Hemoglobin A1c; Future  -     Lipid Panel; Future  -     Hemoglobin & Hematocrit, Blood; Future    Other orders  -     TOUJEO MAX SOLOSTAR 300 UNIT/ML solution pen-injector; Inject 100 Units under the skin into the appropriate area as directed Daily With Breakfast.  -     TRULICITY 1.5 MG/0.5ML solution pen-injector; Inject 1.5 mg under the skin into the appropriate area as directed 1 (One) Time Per Week.  -     SYNJARDY XR 12.5-1000 MG tablet sustained-release 24 hour; Take 2  tablets by mouth Daily With Breakfast.  -     CYCLOSET 0.8 MG tablet; Take 6 tablets by mouth Daily With Breakfast.             In summary I saw and examined this 53-year-old gentleman for above-mentioned problems.  I reviewed his laboratory evaluation of February 18, 2019 and at this time we will go ahead and order extensive laboratory evaluation once the results come back we will go ahead and call for any possible modification or new medications.  In the meantime I am going to go ahead and stop all his current antidiabetic medications including glipizide metformin as well as the 2 kinds of insulins and will start him on Toujeo 80 units every morning and every 3 days if his fasting blood glucose is greater than 110 increase the dose by 2 units every morning up to 100 units daily, Synjardy 5/1000 mg 2 tablets every morning for 4 weeks and then if no problem move up to 12,510 mg 2 tablets every morning, Trulicity 0.75 mg once weekly for 2 weeks and then move up to 1.5 mg weekly and finally Cycloset 1 tablet every morning for 7 days, 2, 3, 4, 5 tablets every morning each for 7 days and after that 6 tablets every morning for maintenance.  This office visit lasted 65 minutes and 35-minute of it was a spent on face-to-face patient counseling as well as education and planning his future care moving forward.  He will see Ms. Angie Perez in 4 months or sooner if needed with laboratory evaluation prior to each office visit.

## 2019-03-14 NOTE — TELEPHONE ENCOUNTER
We were just updating his medication list per what he had been taking, as he was re-establishing care after incarceration at his previous visit. Per Dr Rucker, he will be on this lifelong. Ok to refill for 1 year.

## 2019-03-16 LAB
25(OH)D3+25(OH)D2 SERPL-MCNC: 23.1 NG/ML (ref 30–100)
ACTH PLAS-MCNC: 19.6 PG/ML (ref 7.2–63.3)
ALBUMIN SERPL-MCNC: 4.7 G/DL (ref 3.5–5.2)
ALBUMIN/GLOB SERPL: 1.9 G/DL
ALP SERPL-CCNC: 70 U/L (ref 39–117)
ALT SERPL-CCNC: 21 U/L (ref 1–41)
AST SERPL-CCNC: 11 U/L (ref 1–40)
BILIRUB SERPL-MCNC: 0.6 MG/DL (ref 0.1–1.2)
BUN SERPL-MCNC: 18 MG/DL (ref 6–20)
BUN/CREAT SERPL: 20.7 (ref 7–25)
C PEPTIDE SERPL-MCNC: 4.8 NG/ML (ref 1.1–4.4)
CA-I SERPL ISE-MCNC: 5.2 MG/DL (ref 4.5–5.6)
CALCIUM SERPL-MCNC: 9.7 MG/DL (ref 8.6–10.5)
CHLORIDE SERPL-SCNC: 101 MMOL/L (ref 98–107)
CHOLEST SERPL-MCNC: 153 MG/DL (ref 100–199)
CO2 SERPL-SCNC: 27 MMOL/L (ref 22–29)
CORTIS SERPL-MCNC: 8.7 UG/DL
CREAT SERPL-MCNC: 0.87 MG/DL (ref 0.76–1.27)
FSH SERPL-ACNC: 2.7 MIU/ML (ref 1.5–12.4)
GLOBULIN SER CALC-MCNC: 2.5 GM/DL
GLUCOSE SERPL-MCNC: 184 MG/DL (ref 65–99)
HBA1C MFR BLD: 9.82 % (ref 4.8–5.6)
HDL SERPL-SCNC: 31.4 UMOL/L
HDLC SERPL-MCNC: 45 MG/DL
LDL SERPL QN: 20.5 NM
LDL SERPL-SCNC: 1126 NMOL/L
LDL SMALL SERPL-SCNC: 614 NMOL/L
LDLC SERPL CALC-MCNC: 83 MG/DL (ref 0–99)
LH SERPL-ACNC: 4.7 MIU/ML (ref 1.7–8.6)
MICROALBUMIN UR-MCNC: <3 UG/ML
PHOSPHATE SERPL-MCNC: 3 MG/DL (ref 2.5–4.5)
POTASSIUM SERPL-SCNC: 4.8 MMOL/L (ref 3.5–5.2)
PROLACTIN SERPL-MCNC: 6.8 NG/ML (ref 4–15.2)
PROT SERPL-MCNC: 7.2 G/DL (ref 6–8.5)
PTH-INTACT SERPL-MCNC: 40 PG/ML (ref 15–65)
SHBG SERPL-SCNC: 31.7 NMOL/L (ref 19.3–76.4)
SODIUM SERPL-SCNC: 141 MMOL/L (ref 136–145)
T3FREE SERPL-MCNC: 3 PG/ML (ref 2–4.4)
T4 FREE SERPL-MCNC: 1.16 NG/DL (ref 0.93–1.7)
T4 SERPL-MCNC: 7.28 MCG/DL (ref 4.5–11.7)
TESTOST FREE SERPL-MCNC: 6.6 PG/ML (ref 7.2–24)
TESTOST SERPL-MCNC: 350 NG/DL (ref 264–916)
TRIGL SERPL-MCNC: 123 MG/DL (ref 0–149)
TSH SERPL DL<=0.005 MIU/L-ACNC: 1.92 MIU/ML (ref 0.27–4.2)
URATE SERPL-MCNC: 4.5 MG/DL (ref 3.4–7)

## 2019-03-18 ENCOUNTER — OFFICE VISIT (OUTPATIENT)
Dept: ORTHOPEDIC SURGERY | Facility: CLINIC | Age: 54
End: 2019-03-18

## 2019-03-18 VITALS — WEIGHT: 294.2 LBS | TEMPERATURE: 98.2 F | BODY MASS INDEX: 41.19 KG/M2 | HEIGHT: 71 IN

## 2019-03-18 DIAGNOSIS — G56.03 CARPAL TUNNEL SYNDROME, BILATERAL: ICD-10-CM

## 2019-03-18 DIAGNOSIS — M25.532 WRIST PAIN, LEFT: Primary | ICD-10-CM

## 2019-03-18 PROCEDURE — 73110 X-RAY EXAM OF WRIST: CPT | Performed by: ORTHOPAEDIC SURGERY

## 2019-03-18 PROCEDURE — 99204 OFFICE O/P NEW MOD 45 MIN: CPT | Performed by: ORTHOPAEDIC SURGERY

## 2019-03-18 RX ORDER — INSULIN DEGLUDEC 200 U/ML
100 INJECTION, SOLUTION SUBCUTANEOUS
Qty: 6 PEN | Refills: 5 | Status: SHIPPED | OUTPATIENT
Start: 2019-03-18 | End: 2019-07-16 | Stop reason: SDUPTHER

## 2019-03-18 NOTE — PROGRESS NOTES
Patient: Azael Carmen    YOB: 1965    Medical Record Number: [id number]    Chief Complaints:  Bilateral hand pain and numbness    History of Present Illness:     53 y.o. male patient who presents for evaluation of both hands.  He reports that his symptoms have been gradually worsening over the past few years.  His left is worse than the right.  Denies any discrete precipitating event or factor.  The pain as moderate, constant, and aching.  He wears a brace and this seems to help, particularly with his night symptoms.  Rest and anti-inflammatories have helped somewhat.  He reports constant numbness and tingling in his thumb and index fingers bilaterally.  Denies any other associated complaints or issues.    Allergies:   Allergies   Allergen Reactions   • Cymbalta [Duloxetine Hcl] Hives   • Penicillins Rash       Home Medications:      Current Outpatient Medications:   •  albuterol (ACCUNEB) 1.25 MG/3ML nebulizer solution, Take 3 mL by nebulization Every 6 (Six) Hours As Needed for Wheezing., Disp: 120 vial, Rfl: 0  •  aspirin 81 MG EC tablet, TAKE 1 TABLET BY MOUTH EVERY DAY, Disp: 30 tablet, Rfl: 11  •  atorvastatin (LIPITOR) 20 MG tablet, Take 1 tablet by mouth Daily., Disp: 30 tablet, Rfl: 1  •  CYCLOSET 0.8 MG tablet, Take 6 tablets by mouth Daily With Breakfast., Disp: 180 tablet, Rfl: 5  •  fluticasone (FLONASE) 50 MCG/ACT nasal spray, 1 spray into the nostril(s) as directed by provider Daily., Disp: 1 bottle, Rfl: 2  •  Fluticasone Furoate-Vilanterol (BREO ELLIPTA) 100-25 MCG/INH inhaler, 1 PUFF ONCE DAILY THEN RINSE MOUTH, Disp: 1 each, Rfl: 0  •  glucose blood (ASSURE PLATINUM) test strip, Use as instructed, Disp: 100 each, Rfl: 12  •  hydrOXYzine (ATARAX) 50 MG tablet, Take 1 tablet by mouth Every Night., Disp: 30 tablet, Rfl: 0  •  Insulin Pen Needle (PEN NEEDLES) 31G X 6 MM misc, One inj daily, Disp: 60 each, Rfl: 4  •  Lancet Device misc, Test glucose 3-4 x daily, Disp: 100 each, Rfl:  2  •  Levalbuterol Tartrate (XOPENEX HFA IN), Inhale 2 puffs 2 (Two) Times a Day As Needed., Disp: , Rfl:   •  losartan (COZAAR) 100 MG tablet, Take 1 tablet by mouth Daily., Disp: 30 tablet, Rfl: 1  •  methotrexate 2.5 MG tablet, Take  by mouth 3 (Three) Doses Each Week. Take Doses 12 (Twelve) Hours Apart. 6 tablets once a week, Disp: , Rfl:   •  montelukast (SINGULAIR) 10 MG tablet, Take 10 mg by mouth Every Night., Disp: , Rfl:   •  Multiple Vitamins-Minerals (V-R COMPLETE SENIOR) tablet, Take 1 tablet by mouth Daily., Disp: 90 each, Rfl: 0  •  Naltrexone (VIVITROL IM), Inject  into the appropriate muscle as directed by prescriber Every 30 (Thirty) Days., Disp: , Rfl:   •  potassium chloride (K-DUR,KLOR-CON) 10 MEQ CR tablet, Take 1 tablet by mouth 2 (Two) Times a Day., Disp: 60 tablet, Rfl: 0  •  sertraline (ZOLOFT) 50 MG tablet, Take 3 tablets by mouth Daily., Disp: 90 tablet, Rfl: 2  •  SYNJARDY XR 12.5-1000 MG tablet sustained-release 24 hour, Take 2 tablets by mouth Daily With Breakfast., Disp: 60 tablet, Rfl: 5  •  tamsulosin (FLOMAX) 0.4 MG capsule 24 hr capsule, Take 1 capsule by mouth Daily., Disp: 30 capsule, Rfl: 0  •  TRESIBA FLEXTOUCH 200 UNIT/ML solution pen-injector, Inject 100 Units under the skin into the appropriate area as directed Daily With Breakfast., Disp: 6 pen, Rfl: 5  •  TRULICITY 1.5 MG/0.5ML solution pen-injector, Inject 1.5 mg under the skin into the appropriate area as directed 1 (One) Time Per Week., Disp: 4 pen, Rfl: 5  •  vitamin B-12 (CYANOCOBALAMIN) 1000 MCG tablet, Take 1,000 mcg by mouth Daily., Disp: , Rfl:   •  vitamin D (ERGOCALCIFEROL) 55441 units capsule capsule, Take 1 capsule by mouth Every 7 (Seven) Days., Disp: 4 capsule, Rfl: 4  •  furosemide (LASIX) 40 MG tablet, Take 40 mg by mouth 2 (Two) Times a Day., Disp: , Rfl:   •  ticagrelor (BRILINTA) 90 MG tablet tablet, Take 1 tablet by mouth 2 (Two) Times a Day., Disp: 120 tablet, Rfl: 6    Past Medical History:    Diagnosis Date   • Allergic rhinitis    • Anxiety    • Arthritis    • Asthma    • Chest pain in adult    • COPD (chronic obstructive pulmonary disease) (CMS/[facility])    • Depression    • Diabetes mellitus (CMS/[facility])    • Erectile dysfunction    • GERD (gastroesophageal reflux disease)    • Heart murmur    • Hypertension    • Mixed hyperlipidemia 2016   • Sleep apnea        Past Surgical History:   Procedure Laterality Date   • CARDIAC CATHETERIZATION N/A 2016    Procedure: Coronary angiography;  Surgeon: Blaise Burroughs MD;  Location: [facility] CATH INVASIVE LOCATION;  Service:    • CARDIAC CATHETERIZATION N/A 2016    Procedure: Left Heart Cath;  Surgeon: Blaise Burroughs MD;  Location: [facility] CATH INVASIVE LOCATION;  Service:    • CARDIAC CATHETERIZATION N/A 2016    Procedure: Left ventriculography;  Surgeon: Blaise Burroughs MD;  Location[facility] CATH INVASIVE LOCATION;  Service:    • CARDIAC CATHETERIZATION N/A 10/7/2016    Procedure: Chronic Total Occlussion;  Surgeon: Blaise Burroughs MD;  Location: [facility] CATH INVASIVE LOCATION;  Service:    • VASECTOMY         Social History     Occupational History   • Not on file   Tobacco Use   • Smoking status: Former Smoker     Packs/day: 0.50     Years: 40.00     Pack years: 20.00     Types: Cigarettes     Last attempt to quit: 4/10/2016     Years since quittin.9   • Smokeless tobacco: Never Used   Substance and Sexual Activity   • Alcohol use: No   • Drug use: No   • Sexual activity: Defer      Social History     Social History Narrative   • Not on file       Family History   Problem Relation Age of Onset   • Hypertension Father    • Heart disease Father    • Heart attack Father    • Hypertension Brother    • Cancer Paternal Aunt    • Cancer Maternal Grandmother    • Cancer Maternal Grandfather    • Cancer Paternal Grandmother    • Cancer Paternal Grandfather        Review of Systems:      Constitutional: Denies fever, shaking or  "chills   Eyes: Denies change in visual acuity   HEENT: Denies nasal congestion or sore throat   Respiratory: Denies cough or shortness of breath   Cardiovascular: Denies chest pain or edema  Endocrine: Denies tremors, palpitations, intolerance of heat or cold, polyuria, polydipsia.  GI: Denies abdominal pain, nausea, vomiting, bloody stools or diarrhea  : Denies frequency, urgency, incontinence, retention, or nocturia.  Musculoskeletal: Denies numbness tingling or loss of motor function except as above  Integument: Denies rash, lesion or ulceration   Neurologic: Denies headache or focal weakness, deficits  Heme: Denies epistaxis, spontaneous or excessive bleeding, epistaxis, hematuria, melena, fatigue, enlarged or tender lymph nodes.      All other pertinent positives and negatives as noted above in HPI.      Physical Exam: 53 y.o. male    Vitals:    03/18/19 1325   Temp: 98.2 °F (36.8 °C)   TempSrc: Temporal   Weight: 133 kg (294 lb 3.2 oz)   Height: 180.3 cm (71\")       General:  Patient is awake and alert.  Appears in no acute distress or discomfort.    Psych:  Affect and demeanor are appropriate.    Eyes:  Conjunctiva and sclera appear grossly normal.  Eyes track well and EOM seem to be intact.    Ears:  No gross abnormalities.  Hearing adequate for the exam.    Cardiovascular:  Regular rate and rhythm.    Lungs:  Good chest expansion.  Breathing unlabored.    Lymph:  No palpable masses or adenopathy in either upper extremity    Extremities:      Left upper extremity is examined.  Skin is benign about the hand and wrist.  No atrophy, swelling, or masses.  No palpable adenopathy in the extremity.  No focal areas of exquisite tenderness.  Positive Phalen's maneuver over the carpal tunnel.  No evident instability of the wrist or thumb.  Good , pinch, finger and thumb abduction strength.  Subjectively diminished sensation in the thumb, index and middle finger.  Palpable radial pulse.  Brisk capillary refill.  " Good skin turgor.    Right upper extremity is examined as well.  Exam is similar to that on the left.  No atrophy, swelling or masses.  No focal areas of tenderness.  Good wrist motion.  Positive Phalen's over the carpal tunnel.  Good , pinch, finger and thumb abduction strength.  He has diminished sensation in his thumb and index finger.  Brisk capillary refill.         Radiology:   AP, oblique, and lateral views of the left hand are ordered and reviewed.  No comparison films are immediately available.  There are no lesions, masses, profound arthrosis, or other concerning findings to account for the patient's symptoms.    Assessment/Plan:  Bilateral carpal tunnel syndrome    We discussed conservative treatment options in detail.  My concern is that his numbness and tingling are now constant and I fear that he is at risk for permanent neurologic damage if we do not intervene.  I recommend nerve studies.  We may have to consider surgical options depending upon the results of that study.  He understands and agrees.  We did discuss the surgery in detail and he tells me he is potentially interested in pursuing that, particularly for the left wrist.  We will see what the nerve studies show and go from there.  He understands and agrees.    Bryan Flores MD    03/18/2019    CC to Ibis Lacey PA

## 2019-03-19 ENCOUNTER — PRIOR AUTHORIZATION (OUTPATIENT)
Dept: ENDOCRINOLOGY | Age: 54
End: 2019-03-19

## 2019-03-19 NOTE — TELEPHONE ENCOUNTER
PA TRULICITY APPROVED 03/18/2019-03/17/2020      [employer] APPROVED 03/18/2019-03/18/2020

## 2019-03-21 RX ORDER — ERTUGLIFLOZIN AND METFORMIN HYDROCHLORIDE 7.5; 1 MG/1; MG/1
2 TABLET, FILM COATED ORAL
Qty: 60 TABLET | Refills: 11 | Status: SHIPPED | OUTPATIENT
Start: 2019-03-21 | End: 2019-07-16 | Stop reason: SDUPTHER

## 2019-03-26 ENCOUNTER — TELEPHONE (OUTPATIENT)
Dept: FAMILY MEDICINE CLINIC | Facility: CLINIC | Age: 54
End: 2019-03-26

## 2019-03-26 ENCOUNTER — PRIOR AUTHORIZATION (OUTPATIENT)
Dept: ENDOCRINOLOGY | Age: 54
End: 2019-03-26

## 2019-03-26 NOTE — TELEPHONE ENCOUNTER
[facility] called state the patient's dentist prescribed Ibuprofen 600 mg every 6 hrs and it coming up as a contraindication with Methotrexate that it will increase the levels of Methotrexate, they have not filled the Ibuprofen script yet,oaky for them to fill?

## 2019-04-01 RX ORDER — METHOTREXATE 2.5 MG/1
TABLET ORAL
Qty: 24 TABLET | Refills: 3 | Status: SHIPPED | OUTPATIENT
Start: 2019-04-01 | End: 2019-05-09

## 2019-04-02 ENCOUNTER — HOSPITAL ENCOUNTER (OUTPATIENT)
Dept: CT IMAGING | Facility: HOSPITAL | Age: 54
Discharge: HOME | End: 2019-04-02
Admitting: INTERNAL MEDICINE

## 2019-04-02 ENCOUNTER — APPOINTMENT (OUTPATIENT)
Dept: CT IMAGING | Facility: HOSPITAL | Age: 54
End: 2019-04-02

## 2019-04-02 DIAGNOSIS — J44.9 CHRONIC OBSTRUCTIVE PULMONARY DISEASE, UNSPECIFIED COPD TYPE (HCC): ICD-10-CM

## 2019-04-02 DIAGNOSIS — R93.89 ABNORMAL CXR: ICD-10-CM

## 2019-04-02 DIAGNOSIS — J45.909 ASTHMA IN ADULT, UNSPECIFIED ASTHMA SEVERITY, UNSPECIFIED WHETHER COMPLICATED, UNSPECIFIED WHETHER PERSISTENT: ICD-10-CM

## 2019-04-02 PROCEDURE — 71250 CT THORAX DX C-: CPT

## 2019-04-08 RX ORDER — POTASSIUM CHLORIDE 750 MG/1
TABLET, FILM COATED, EXTENDED RELEASE ORAL
Qty: 60 TABLET | Refills: 0 | Status: SHIPPED | OUTPATIENT
Start: 2019-04-08 | End: 2019-04-20 | Stop reason: SDUPTHER

## 2019-04-08 RX ORDER — TAMSULOSIN HYDROCHLORIDE 0.4 MG/1
1 CAPSULE ORAL DAILY
Qty: 30 CAPSULE | Refills: 0 | Status: SHIPPED | OUTPATIENT
Start: 2019-04-08 | End: 2019-04-20 | Stop reason: SDUPTHER

## 2019-04-08 NOTE — TELEPHONE ENCOUNTER
Patient was advised to follow up with me in no more than 1 month 2/11/19. He needs follow up with me. Ok to refill until appt.

## 2019-04-09 ENCOUNTER — OFFICE VISIT (OUTPATIENT)
Dept: FAMILY MEDICINE CLINIC | Facility: CLINIC | Age: 54
End: 2019-04-09

## 2019-04-09 VITALS
SYSTOLIC BLOOD PRESSURE: 140 MMHG | OXYGEN SATURATION: 93 % | DIASTOLIC BLOOD PRESSURE: 70 MMHG | BODY MASS INDEX: 40.74 KG/M2 | WEIGHT: 291 LBS | HEIGHT: 71 IN | TEMPERATURE: 98.1 F | RESPIRATION RATE: 16 BRPM | HEART RATE: 52 BPM

## 2019-04-09 DIAGNOSIS — M51.36 BULGING OF LUMBAR INTERVERTEBRAL DISC: ICD-10-CM

## 2019-04-09 DIAGNOSIS — E55.9 VITAMIN D DEFICIENCY: ICD-10-CM

## 2019-04-09 DIAGNOSIS — I25.10 CORONARY ARTERY DISEASE INVOLVING NATIVE CORONARY ARTERY OF NATIVE HEART WITHOUT ANGINA PECTORIS: ICD-10-CM

## 2019-04-09 DIAGNOSIS — E78.2 MIXED HYPERLIPIDEMIA: Primary | ICD-10-CM

## 2019-04-09 DIAGNOSIS — R74.8 ELEVATED CPK: ICD-10-CM

## 2019-04-09 DIAGNOSIS — E11.65 UNCONTROLLED TYPE 2 DIABETES MELLITUS WITH HYPERGLYCEMIA (HCC): ICD-10-CM

## 2019-04-09 DIAGNOSIS — M19.90 ARTHRITIS: ICD-10-CM

## 2019-04-09 DIAGNOSIS — R53.82 CHRONIC FATIGUE: ICD-10-CM

## 2019-04-09 DIAGNOSIS — G47.33 OBSTRUCTIVE SLEEP APNEA: ICD-10-CM

## 2019-04-09 DIAGNOSIS — M50.120 CERVICAL DISC DISORDER WITH RADICULOPATHY OF MID-CERVICAL REGION: ICD-10-CM

## 2019-04-09 DIAGNOSIS — E29.1 HYPOGONADISM IN MALE: ICD-10-CM

## 2019-04-09 DIAGNOSIS — J44.9 COPD, MILD (HCC): ICD-10-CM

## 2019-04-09 PROCEDURE — 99214 OFFICE O/P EST MOD 30 MIN: CPT | Performed by: PHYSICIAN ASSISTANT

## 2019-04-09 NOTE — PROGRESS NOTES
Subjective   Azael Carmen is a 53 y.o. male present today for anxiety, depression, Vitamin D Deficiency, COPD, and mixed hyperlipidemia.     History of Present Illness     Dermatology- [facility]- appt 7/15/19 for psoriasis on Methotrexate- was not able to be seen until 7/15/19.   Cardiology- Dr Rucker- last appt 2/21/19 for CAD s/p PCI, HTN- stopped Ticagrelor due to being 2 years out from stent. Follow up in 6 months.  Endocrinology- Dr Hardy- last appt 3/14/19 for DM, hyperlipidemia, hypogonadism, and Vitamin D deficiency- changed all medications and follow up in 4 months.   Orthopedic surgery- Dr Flores- last appt 3/18/19 for carpal tunnel surgery- referred for EMG/NCS and will consider intervention pending results. Appt with Dr Leyva, neurology, 4/15/19  Pulmonology- Dr Mata- for IRINA, asthma/ COPD- was seen initially and ordered CT chest 4/2/19 with new patchy infiltrate and gallstone. Has follow up today with card from [facility].     Liver US- irregular margins- possible cirrhosis and gallstones without cholecystitis    Reports blood sugars are much better controlled- 80s yesterday and 90s today.   Still no energy and getting SOA - thinks it is his lungs and breathing not his heart.     Still seeing [facility]- on Zoloft and Atarax for anxiety.   States he was seeing Pain management but could not afford to go to [place] regularly. Still having bilateral hand, right shoulder and left pain as well as neck and back pain.   States he went to the Dentist and they could not give medication for pain due to Methotrexate (however, he is on Vivitrol, which is the likely reason).     The following portions of the patient's history were reviewed and updated as appropriate: allergies, current medications, past family history, past medical history, past social history, past surgical history and problem list.    Review of Systems   Constitutional: Positive for fatigue.   Respiratory: Positive for shortness of breath.     All other systems reviewed and are negative.      Objective   Physical Exam   Constitutional: He is oriented to person, place, and time. He appears well-developed.   HENT:   Head: Normocephalic and atraumatic.   Right Ear: External ear normal.   Left Ear: External ear normal.   Eyes: Conjunctivae are normal.   Neck: Carotid bruit is not present. No tracheal deviation present. No thyroid mass and no thyromegaly present.   Cardiovascular: Normal rate, regular rhythm, normal heart sounds and intact distal pulses.   Pulmonary/Chest: Effort normal. He has wheezes (mild throughout).   Neurological: He is alert and oriented to person, place, and time. Gait normal.   Skin: Skin is warm and dry.   Psychiatric: He has a normal mood and affect. His behavior is normal. Judgment and thought content normal.   Nursing note and vitals reviewed.      Assessment/Plan   Azael was seen today for med management.    Diagnoses and all orders for this visit:    Mixed hyperlipidemia    Coronary artery disease involving native coronary artery of native heart without angina pectoris    COPD, mild (CMS/HCC)    Obstructive sleep apnea    Vitamin D deficiency    Uncontrolled type 2 diabetes mellitus with hyperglycemia (CMS/HCC)    Hypogonadism in male    Bulging of lumbar intervertebral disc    Cervical disc disorder with radiculopathy of mid-cervical region    Arthritis    Chronic fatigue    Elevated CPK      Patient Instructions   3-year-old male who presents today in follow-up of reestablishing care last visit.  He was referred to endocrinology, cardiology, pulmonology, dermatology, and orthopedic surgery to reestablish care.  He is also continuing care with psychiatry.  Patient was seen by all specialist with the exception of dermatology already.  Appointments as listed below.  He has dermatology appointment July 15, 2019.  I refilled his methotrexate only until he can see dermatology.  This was started while he was incarcerated for  psoriasis.  I wanted dermatology to assess and determine appropriateness of this or any other recommended treatments.  He had CT with pulmonology and will follow-up there today.  Patient continues with shortness of breath and dyspnea on exertion.  He feels this is more due to his lungs than his coronary artery disease.  Patient is wheezing significantly on exam today.  He has an appointment with pulmonology today and I will have them determine his treatment recommendations.  They are following up on CT that was recently performed.  He was again noted to have gallstones on CT, however, he does not want to see a surgeon at this time for cholecystectomy.  Patient also had a liver ultrasound when he went to the emergency department that showed possible irregular borders consistent with cirrhosis.  I discussed this with him today.  We will need to consider GI referral.  He would like to wait on this at this time.  We will address all other issues and ensure he is established and stable with all other providers.  We will then consider general surgery referral once he has been cleared by cardiology and pulmonology.  Patient also continues with significant fatigue.  I will await appointment with pulmonology for sleep apnea and CPAP recommendations.  Also, all medications were changed with endocrinology.  We will await results with new medications.  Patient to follow-up the end of July.  We will review all specialists and any further testing that is needed.    Patient's specialists:  Dermatology- [facility]- appt 7/15/19 for psoriasis on Methotrexate- was not able to be seen until 7/15/19.   Cardiology- Dr Rucker- last appt 2/21/19 for CAD s/p PCI, HTN- stopped Ticagrelor due to being 2 years out from stent. Follow up in 6 months.  Endocrinology- Dr Hardy- last appt 3/14/19 for DM, hyperlipidemia, hypogonadism, and Vitamin D deficiency- changed all medications and follow up in 4 months.   Orthopedic surgery- Dr Flores- last  appt 3/18/19 for carpal tunnel surgery- referred for EMG/NCS and will consider intervention pending results. Appt with Dr Leyva, neurology, 4/15/19  Pulmonology- Dr Mata- for IRINA, asthma/ COPD- was seen initially and ordered CT chest 4/2/19 with new patchy infiltrate and gallstone. Has follow up today with card from [facility].   Psychiatry- [facility]- on Zoloft and Atarax for anxiety.  Dentist- had follow up, up to date with appt  He was seeing Pain management but could not afford to go to [place] regularly. Still having bilateral hand, right shoulder and left pain as well as neck and back pain.   Liver US- irregular margins- possible cirrhosis and gallstones without cholecystitis

## 2019-04-15 ENCOUNTER — HOSPITAL ENCOUNTER (OUTPATIENT)
Dept: INFUSION THERAPY | Facility: HOSPITAL | Age: 54
Discharge: HOME | End: 2019-04-15
Admitting: PSYCHIATRY & NEUROLOGY

## 2019-04-15 DIAGNOSIS — G56.03 CARPAL TUNNEL SYNDROME, BILATERAL: ICD-10-CM

## 2019-04-15 PROCEDURE — 95886 MUSC TEST DONE W/N TEST COMP: CPT

## 2019-04-15 PROCEDURE — 95911 NRV CNDJ TEST 9-10 STUDIES: CPT | Performed by: PSYCHIATRY & NEUROLOGY

## 2019-04-15 PROCEDURE — 95911 NRV CNDJ TEST 9-10 STUDIES: CPT

## 2019-04-15 PROCEDURE — 95886 MUSC TEST DONE W/N TEST COMP: CPT | Performed by: PSYCHIATRY & NEUROLOGY

## 2019-04-15 NOTE — PATIENT INSTRUCTIONS
3-year-old male who presents today in follow-up of reestablishing care last visit.  He was referred to endocrinology, cardiology, pulmonology, dermatology, and orthopedic surgery to reestablish care.  He is also continuing care with psychiatry.  Patient was seen by all specialist with the exception of dermatology already.  Appointments as listed below.  He has dermatology appointment July 15, 2019.  I refilled his methotrexate only until he can see dermatology.  This was started while he was incarcerated for psoriasis.  I wanted dermatology to assess and determine appropriateness of this or any other recommended treatments.  He had CT with pulmonology and will follow-up there today.  Patient continues with shortness of breath and dyspnea on exertion.  He feels this is more due to his lungs than his coronary artery disease.  Patient is wheezing significantly on exam today.  He has an appointment with pulmonology today and I will have them determine his treatment recommendations.  They are following up on CT that was recently performed.  He was again noted to have gallstones on CT, however, he does not want to see a surgeon at this time for cholecystectomy.  Patient also had a liver ultrasound when he went to the emergency department that showed possible irregular borders consistent with cirrhosis.  I discussed this with him today.  We will need to consider GI referral.  He would like to wait on this at this time.  We will address all other issues and ensure he is established and stable with all other providers.  We will then consider general surgery referral once he has been cleared by cardiology and pulmonology.  Patient also continues with significant fatigue.  I will await appointment with pulmonology for sleep apnea and CPAP recommendations.  Also, all medications were changed with endocrinology.  We will await results with new medications.  Patient to follow-up the end of July.  We will review all specialists  and any further testing that is needed.    Patient's specialists:  Dermatology- [facility]- appt 7/15/19 for psoriasis on Methotrexate- was not able to be seen until 7/15/19.   Cardiology- Dr Rucker- last appt 2/21/19 for CAD s/p PCI, HTN- stopped Ticagrelor due to being 2 years out from stent. Follow up in 6 months.  Endocrinology- Dr Hardy- last appt 3/14/19 for DM, hyperlipidemia, hypogonadism, and Vitamin D deficiency- changed all medications and follow up in 4 months.   Orthopedic surgery- Dr Flores- last appt 3/18/19 for carpal tunnel surgery- referred for EMG/NCS and will consider intervention pending results. Appt with Dr Leyva, neurology, 4/15/19  Pulmonology- Dr Mata- for IRINA, asthma/ COPD- was seen initially and ordered CT chest 4/2/19 with new patchy infiltrate and gallstone. Has follow up today with card from [facility].   Psychiatry- [facility]- on Zoloft and Atarax for anxiety.  Dentist- had follow up, up to date with appt  He was seeing Pain management but could not afford to go to [place] regularly. Still having bilateral hand, right shoulder and left pain as well as neck and back pain.   Liver US- irregular margins- possible cirrhosis and gallstones without cholecystitis

## 2019-04-15 NOTE — PROCEDURES
EMG and Nerve Conduction Studies    Please see data sheets for details on methods, temperatures and lab standards. EMG muscles tested for upper extremity studies include the deltoid, biceps, triceps, pronator teres, extensor digitorum communis, first dorsal interosseous and abductor pollicis brevis.  EMG muscles tested for lower extremity studies include the vastus lateralis, tibialis anterior, peroneus longus, medial gastrocnemius and extensor digitorum brevis.  Additional muscles tested as needed.  Paraspinal muscles tested as needed.  The complete report includes the data sheets.    Indication: Bilateral carpal tunnel syndrome  History: 53-year-old male with numbness and tingling in both hands.  He states the left hand is worse than the right.  He also has right shoulder pain with some limited range of motion.  He has history of diabetes.      Ht: 180.3 cm  Wt: 132 kg; BMI 40.6  HbA1C:   Lab Results   Component Value Date    HGBA1C 9.82 (H) 03/14/2019     TSH:   Lab Results   Component Value Date    TSH 1.920 03/14/2019       Technical summary:  Nerve conduction studies were obtained in both arms.  Hands were warmed prior to study and skin temperature was at least 32 °C measured on the palms.  Needle examination was obtained on selected muscles in both arms.    Results:  1.  Moderately prolonged median sensory latencies bilaterally at 5.3 ms on the left and 4.8 ms on the right.  The amplitudes were low at 12 µV on the left and 14 µV on the right.  2.  Normal ulnar sensory studies bilaterally.  3.  Normal left radial sensory study.  4.  Prolonged median motor latencies bilaterally at 4.9 ms on the left and 4.7 ms on the right with normal conduction velocities and amplitudes bilaterally.  5.  Normal ulnar motor studies bilaterally.  6.  Needle examination of selected muscles in both arms showed normal insertional activities throughout.  There were normal motor units and recruitment patterns with the abductor  pollicis brevis muscles showing essentially full interference patterns.    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  There was no evidence of an ulnar neuropathy or cervical radiculopathy on either side by the study.  Study results were discussed with the patient.    Live Leyva M.D.              Dictated utilizing Dragon dictation.

## 2019-04-19 ENCOUNTER — PREP FOR SURGERY (OUTPATIENT)
Dept: OTHER | Facility: HOSPITAL | Age: 54
End: 2019-04-19

## 2019-04-19 ENCOUNTER — TELEPHONE (OUTPATIENT)
Dept: ORTHOPEDIC SURGERY | Facility: CLINIC | Age: 54
End: 2019-04-19

## 2019-04-19 DIAGNOSIS — G56.02 LEFT CARPAL TUNNEL SYNDROME: Primary | ICD-10-CM

## 2019-04-19 RX ORDER — CEFAZOLIN SODIUM 2 G/100ML
2 INJECTION, SOLUTION INTRAVENOUS ONCE
Status: CANCELLED | OUTPATIENT
Start: 2019-05-14 | End: 2019-04-19

## 2019-04-19 NOTE — TELEPHONE ENCOUNTER
I spoke with him.  We discussed all available treatments for this condition in detail, both surgical and non-surgical.  Given the presence of constant numbness and subjective weakness, I have recommended that we consider surgery to hopefully prevent permanent nerve damage.  We discussed the surgery and all that entails including all risks, benefits, and alternatives.  The risks discussed included infection, wound healing problems, hematoma, persistent pain/numbness/weakness despite the surgery, iatrogenic damage to the median and/or motor recurrent branch of the median nerve which could result in permanent weakness, numbness and dysfunction.  We also talked about positioning related neuropraxia, RSD, DVT, PE and death as well.  No guarrantees were given regarding the results of the procedure.  The patient has acknowledged understanding and consents to proceed.

## 2019-04-22 RX ORDER — TAMSULOSIN HYDROCHLORIDE 0.4 MG/1
CAPSULE ORAL
Qty: 30 CAPSULE | Refills: 3 | Status: SHIPPED | OUTPATIENT
Start: 2019-04-22 | End: 2019-05-09

## 2019-04-22 RX ORDER — ATORVASTATIN CALCIUM 20 MG/1
20 TABLET, FILM COATED ORAL DAILY
Qty: 30 TABLET | Refills: 3 | Status: SHIPPED | OUTPATIENT
Start: 2019-04-22 | End: 2019-05-09

## 2019-04-22 RX ORDER — POTASSIUM CHLORIDE 750 MG/1
TABLET, FILM COATED, EXTENDED RELEASE ORAL
Qty: 60 TABLET | Refills: 3 | Status: SHIPPED | OUTPATIENT
Start: 2019-04-22 | End: 2019-05-09

## 2019-04-22 RX ORDER — LOSARTAN POTASSIUM 100 MG/1
100 TABLET ORAL DAILY
Qty: 30 TABLET | Refills: 3 | Status: SHIPPED | OUTPATIENT
Start: 2019-04-22 | End: 2019-05-09

## 2019-04-26 ENCOUNTER — TELEPHONE (OUTPATIENT)
Dept: FAMILY MEDICINE CLINIC | Facility: CLINIC | Age: 54
End: 2019-04-26

## 2019-04-26 NOTE — TELEPHONE ENCOUNTER
Please see scanned denial.  They recommended mometasone nasal spray, which is the same as Nasonex.  Okay to change to Nasacort which is the same as triamcinolone if they prefer.  This was not what was listed on the denial from the insurance.

## 2019-04-26 NOTE — TELEPHONE ENCOUNTER
Pt's pharmacy called regarding some medications.   Pt was previously prescribed Flonase and then was switched to Nasonex.   Pharmacy wants to know due to insurance purposes if he can be changed to triamcinolone nasal spray.   He would continue with the same directions as the last medication.

## 2019-05-02 PROBLEM — G56.02 LEFT CARPAL TUNNEL SYNDROME: Status: ACTIVE | Noted: 2019-05-02

## 2019-05-09 ENCOUNTER — APPOINTMENT (OUTPATIENT)
Dept: PREADMISSION TESTING | Facility: HOSPITAL | Age: 54
End: 2019-05-09

## 2019-05-09 VITALS
HEIGHT: 70 IN | WEIGHT: 271.9 LBS | OXYGEN SATURATION: 99 % | SYSTOLIC BLOOD PRESSURE: 110 MMHG | RESPIRATION RATE: 20 BRPM | HEART RATE: 83 BPM | BODY MASS INDEX: 38.93 KG/M2 | TEMPERATURE: 98.3 F | DIASTOLIC BLOOD PRESSURE: 71 MMHG

## 2019-05-09 DIAGNOSIS — G56.02 LEFT CARPAL TUNNEL SYNDROME: ICD-10-CM

## 2019-05-09 LAB
ANION GAP SERPL CALCULATED.3IONS-SCNC: 14 MMOL/L
BILIRUB UR QL STRIP: NEGATIVE
BUN BLD-MCNC: 22 MG/DL (ref 6–20)
BUN/CREAT SERPL: 19.6 (ref 7–25)
CALCIUM SPEC-SCNC: 9.5 MG/DL (ref 8.6–10.5)
CHLORIDE SERPL-SCNC: 97 MMOL/L (ref 98–107)
CLARITY UR: CLEAR
CO2 SERPL-SCNC: 24 MMOL/L (ref 22–29)
COLOR UR: YELLOW
CREAT BLD-MCNC: 1.12 MG/DL (ref 0.76–1.27)
DEPRECATED RDW RBC AUTO: 49.1 FL (ref 37–54)
ERYTHROCYTE [DISTWIDTH] IN BLOOD BY AUTOMATED COUNT: 16.2 % (ref 12.3–15.4)
GFR SERPL CREATININE-BSD FRML MDRD: 68 ML/MIN/1.73
GLUCOSE BLD-MCNC: 105 MG/DL (ref 65–99)
GLUCOSE UR STRIP-MCNC: ABNORMAL MG/DL
HCT VFR BLD AUTO: 46.2 % (ref 37.5–51)
HGB BLD-MCNC: 14.8 G/DL (ref 13–17.7)
HGB UR QL STRIP.AUTO: NEGATIVE
KETONES UR QL STRIP: NEGATIVE
LEUKOCYTE ESTERASE UR QL STRIP.AUTO: NEGATIVE
MCH RBC QN AUTO: 27.5 PG (ref 26.6–33)
MCHC RBC AUTO-ENTMCNC: 32 G/DL (ref 31.5–35.7)
MCV RBC AUTO: 85.9 FL (ref 79–97)
NITRITE UR QL STRIP: NEGATIVE
PH UR STRIP.AUTO: <=5 [PH] (ref 5–8)
PLATELET # BLD AUTO: 445 10*3/MM3 (ref 140–450)
PMV BLD AUTO: 9.4 FL (ref 6–12)
POTASSIUM BLD-SCNC: 4 MMOL/L (ref 3.5–5.2)
PROT UR QL STRIP: NEGATIVE
RBC # BLD AUTO: 5.38 10*6/MM3 (ref 4.14–5.8)
SODIUM BLD-SCNC: 135 MMOL/L (ref 136–145)
SP GR UR STRIP: 1.02 (ref 1–1.03)
UROBILINOGEN UR QL STRIP: ABNORMAL
WBC NRBC COR # BLD: 10.44 10*3/MM3 (ref 3.4–10.8)

## 2019-05-09 PROCEDURE — 80048 BASIC METABOLIC PNL TOTAL CA: CPT | Performed by: ORTHOPAEDIC SURGERY

## 2019-05-09 PROCEDURE — 81003 URINALYSIS AUTO W/O SCOPE: CPT | Performed by: ORTHOPAEDIC SURGERY

## 2019-05-09 PROCEDURE — 85027 COMPLETE CBC AUTOMATED: CPT | Performed by: ORTHOPAEDIC SURGERY

## 2019-05-09 PROCEDURE — 36415 COLL VENOUS BLD VENIPUNCTURE: CPT

## 2019-05-09 RX ORDER — LOSARTAN POTASSIUM 100 MG/1
100 TABLET ORAL DAILY
COMMUNITY
End: 2021-01-06 | Stop reason: SDUPTHER

## 2019-05-09 RX ORDER — TRIAMCINOLONE ACETONIDE 55 UG/1
2 SPRAY, METERED NASAL DAILY
COMMUNITY
End: 2019-06-06 | Stop reason: SDUPTHER

## 2019-05-09 RX ORDER — FUROSEMIDE 40 MG/1
40 TABLET ORAL DAILY
COMMUNITY
End: 2019-07-01 | Stop reason: SDUPTHER

## 2019-05-09 RX ORDER — SERTRALINE HYDROCHLORIDE 100 MG/1
100 TABLET, FILM COATED ORAL NIGHTLY
COMMUNITY
End: 2022-12-19

## 2019-05-09 RX ORDER — TAMSULOSIN HYDROCHLORIDE 0.4 MG/1
1 CAPSULE ORAL DAILY
COMMUNITY
End: 2022-12-08 | Stop reason: SDUPTHER

## 2019-05-09 RX ORDER — POTASSIUM CHLORIDE 750 MG/1
10 TABLET, FILM COATED, EXTENDED RELEASE ORAL 2 TIMES DAILY
COMMUNITY
End: 2021-01-06 | Stop reason: SDUPTHER

## 2019-05-09 RX ORDER — ATORVASTATIN CALCIUM 20 MG/1
20 TABLET, FILM COATED ORAL DAILY
COMMUNITY
End: 2021-01-06 | Stop reason: SDUPTHER

## 2019-05-09 NOTE — DISCHARGE INSTRUCTIONS
Take the following medications the morning of surgery with a small sip of water:  INHALERS AND LOSARTAN    ARRIVAL TIME 0600 PER MD OFFICE INSTRUCTIONS/ HOSPITAL TO CALL AND VERIFY DAY BEFORE SURGERY        General Instructions:  • Do not eat solid food after midnight the night before surgery.  • You may drink clear liquids day of surgery but must stop at least one hour before your hospital arrival time   • It is beneficial for you to have a clear drink that contains carbohydrates the day of surgery.  We suggest a 12 to 20 ounce bottle of Gatorade or Powerade for non-diabetic patients or a 12 to 20 ounce bottle of G2 or Powerade Zero for diabetic patients. (Pediatric patients, are not advised to drink a 12 to 20 ounce carbohydrate drink)    Clear liquids are liquids you can see through.  Nothing red in color.     Plain water                               Sports drinks  Sodas                                   Gelatin (Jell-O)  Fruit juices without pulp such as white grape juice and apple juice  Popsicles that contain no fruit or yogurt  Tea or coffee (no cream or milk added)  Gatorade / Powerade  G2 / Powerade Zero    • Infants may have breast milk up to four hours before surgery.  • Infants drinking formula may drink formula up to six hours before surgery.   • Patients who avoid smoking, chewing tobacco and alcohol for 4 weeks prior to surgery have a reduced risk of post-operative complications.  Quit smoking as many days before surgery as you can.  • Do not smoke, use chewing tobacco or drink alcohol the day of surgery.   • If applicable bring your C-PAP/ BI-PAP machine.  • Bring any papers given to you in the doctor’s office.  • Wear clean comfortable clothes and socks.  • Do not wear contact lenses, false eyelashes or make-up.  Bring a case for your glasses.   • Bring crutches or walker if applicable.  • Remove all piercings.  Leave jewelry and any other valuables at home.  • Hair extensions with metal clips must  be removed prior to surgery.  • The Pre-Admission Testing nurse will instruct you to bring medications if unable to obtain an accurate list in Pre-Admission Testing.            Preventing a Surgical Site Infection:  • For 2 to 3 days before surgery, avoid shaving with a razor because the razor can irritate skin and make it easier to develop an infection.    • Any areas of open skin can increase the risk of a post-operative wound infection by allowing bacteria to enter and travel throughout the body.  Notify your surgeon if you have any skin wounds / rashes even if it is not near the expected surgical site.  The area will need assessed to determine if surgery should be delayed until it is healed.  • The night prior to surgery sleep in a clean bed with clean clothing.  Do not allow pets to sleep with you.  • Shower on the morning of surgery using a fresh bar of anti-bacterial soap (such as Dial) and clean washcloth.  Dry with a clean towel and dress in clean clothing.  • Ask your surgeon if you will be receiving antibiotics prior to surgery.  • Make sure you, your family, and all healthcare providers clean their hands with soap and water or an alcohol based hand  before caring for you or your wound.    Day of surgery:  Upon arrival, a Pre-op nurse and Anesthesiologist will review your health history, obtain vital signs, and answer questions you may have.  The only belongings needed at this time will be a list of your home medications and if applicable your C-PAP/BI-PAP machine.  If you are staying overnight your family can leave the rest of your belongings in the car and bring them to your room later.  A Pre-op nurse will start an IV and you may receive medication in preparation for surgery, including something to help you relax.  Your family will be able to see you in the Pre-op area.  While you are in surgery your family should notify the waiting room  if they leave the waiting room area and  provide a contact phone number.    Please be aware that surgery does come with discomfort.  We want to make every effort to control your discomfort so please discuss any uncontrolled symptoms with your nurse.   Your doctor will most likely have prescribed pain medications.      If you are going home after surgery you will receive individualized written care instructions before being discharged.  A responsible adult must drive you to and from the hospital on the day of your surgery and stay with you for 24 hours.    If you are staying overnight following surgery, you will be transported to your hospital room following the recovery period.  [facility] has all private rooms.    You have received a list of surgical assistants for your reference.  If you have any questions please call Pre-Admission Testing at [phone].  Deductibles and co-payments are collected on the day of service. Please be prepared to pay the required co-pay, deductible or deposit on the day of service as defined by your plan.

## 2019-05-14 ENCOUNTER — ANESTHESIA EVENT (OUTPATIENT)
Dept: PERIOP | Facility: HOSPITAL | Age: 54
End: 2019-05-14

## 2019-05-14 ENCOUNTER — ANESTHESIA (OUTPATIENT)
Dept: PERIOP | Facility: HOSPITAL | Age: 54
End: 2019-05-14

## 2019-05-14 ENCOUNTER — HOSPITAL ENCOUNTER (OUTPATIENT)
Facility: HOSPITAL | Age: 54
Setting detail: HOSPITAL OUTPATIENT SURGERY
Discharge: HOME | End: 2019-05-14
Attending: ORTHOPAEDIC SURGERY | Admitting: ORTHOPAEDIC SURGERY

## 2019-05-14 VITALS
DIASTOLIC BLOOD PRESSURE: 70 MMHG | HEART RATE: 53 BPM | SYSTOLIC BLOOD PRESSURE: 134 MMHG | OXYGEN SATURATION: 95 % | RESPIRATION RATE: 16 BRPM | TEMPERATURE: 97.8 F

## 2019-05-14 DIAGNOSIS — G56.02 LEFT CARPAL TUNNEL SYNDROME: ICD-10-CM

## 2019-05-14 LAB
GLUCOSE BLDC GLUCOMTR-MCNC: 102 MG/DL (ref 70–130)
GLUCOSE BLDC GLUCOMTR-MCNC: 104 MG/DL (ref 70–130)

## 2019-05-14 PROCEDURE — 25010000002 MIDAZOLAM PER 1 MG: Performed by: ANESTHESIOLOGY

## 2019-05-14 PROCEDURE — 64721 CARPAL TUNNEL SURGERY: CPT | Performed by: ORTHOPAEDIC SURGERY

## 2019-05-14 PROCEDURE — 25010000002 PROPOFOL 10 MG/ML EMULSION: Performed by: NURSE ANESTHETIST, CERTIFIED REGISTERED

## 2019-05-14 PROCEDURE — 94640 AIRWAY INHALATION TREATMENT: CPT

## 2019-05-14 PROCEDURE — 25010000002 ROPIVACAINE PER 1 MG: Performed by: ANESTHESIOLOGY

## 2019-05-14 PROCEDURE — 82962 GLUCOSE BLOOD TEST: CPT

## 2019-05-14 PROCEDURE — 25010000003 MEPIVACAINE PER 10 ML: Performed by: ANESTHESIOLOGY

## 2019-05-14 PROCEDURE — 25010000003 CEFAZOLIN IN DEXTROSE 2-4 GM/100ML-% SOLUTION: Performed by: ORTHOPAEDIC SURGERY

## 2019-05-14 PROCEDURE — 25010000002 ONDANSETRON PER 1 MG: Performed by: NURSE ANESTHETIST, CERTIFIED REGISTERED

## 2019-05-14 RX ORDER — ACETAMINOPHEN 650 MG/1
650 SUPPOSITORY RECTAL ONCE AS NEEDED
Status: DISCONTINUED | OUTPATIENT
Start: 2019-05-14 | End: 2019-05-14 | Stop reason: HOSPADM

## 2019-05-14 RX ORDER — LABETALOL HYDROCHLORIDE 5 MG/ML
5 INJECTION, SOLUTION INTRAVENOUS
Status: DISCONTINUED | OUTPATIENT
Start: 2019-05-14 | End: 2019-05-14 | Stop reason: HOSPADM

## 2019-05-14 RX ORDER — HYDRALAZINE HYDROCHLORIDE 20 MG/ML
5 INJECTION INTRAMUSCULAR; INTRAVENOUS
Status: DISCONTINUED | OUTPATIENT
Start: 2019-05-14 | End: 2019-05-14 | Stop reason: HOSPADM

## 2019-05-14 RX ORDER — PROMETHAZINE HYDROCHLORIDE 25 MG/ML
6.25 INJECTION, SOLUTION INTRAMUSCULAR; INTRAVENOUS
Status: DISCONTINUED | OUTPATIENT
Start: 2019-05-14 | End: 2019-05-14 | Stop reason: HOSPADM

## 2019-05-14 RX ORDER — GLYCOPYRROLATE 0.2 MG/ML
INJECTION INTRAMUSCULAR; INTRAVENOUS AS NEEDED
Status: DISCONTINUED | OUTPATIENT
Start: 2019-05-14 | End: 2019-05-14 | Stop reason: SURG

## 2019-05-14 RX ORDER — ONDANSETRON 4 MG/1
4 TABLET, FILM COATED ORAL EVERY 8 HOURS PRN
Qty: 25 TABLET | Refills: 0 | Status: SHIPPED | OUTPATIENT
Start: 2019-05-14 | End: 2019-07-29

## 2019-05-14 RX ORDER — SODIUM CHLORIDE 0.9 % (FLUSH) 0.9 %
1-10 SYRINGE (ML) INJECTION AS NEEDED
Status: DISCONTINUED | OUTPATIENT
Start: 2019-05-14 | End: 2019-05-14 | Stop reason: HOSPADM

## 2019-05-14 RX ORDER — MAGNESIUM HYDROXIDE 1200 MG/15ML
LIQUID ORAL AS NEEDED
Status: DISCONTINUED | OUTPATIENT
Start: 2019-05-14 | End: 2019-05-14 | Stop reason: HOSPADM

## 2019-05-14 RX ORDER — FENTANYL CITRATE 50 UG/ML
50 INJECTION, SOLUTION INTRAMUSCULAR; INTRAVENOUS
Status: DISCONTINUED | OUTPATIENT
Start: 2019-05-14 | End: 2019-05-14 | Stop reason: HOSPADM

## 2019-05-14 RX ORDER — IPRATROPIUM BROMIDE AND ALBUTEROL SULFATE 2.5; .5 MG/3ML; MG/3ML
3 SOLUTION RESPIRATORY (INHALATION) ONCE
Status: COMPLETED | OUTPATIENT
Start: 2019-05-14 | End: 2019-05-14

## 2019-05-14 RX ORDER — ACETAMINOPHEN 325 MG/1
650 TABLET ORAL ONCE AS NEEDED
Status: DISCONTINUED | OUTPATIENT
Start: 2019-05-14 | End: 2019-05-14 | Stop reason: HOSPADM

## 2019-05-14 RX ORDER — FAMOTIDINE 10 MG/ML
20 INJECTION, SOLUTION INTRAVENOUS ONCE
Status: COMPLETED | OUTPATIENT
Start: 2019-05-14 | End: 2019-05-14

## 2019-05-14 RX ORDER — ONDANSETRON 2 MG/ML
INJECTION INTRAMUSCULAR; INTRAVENOUS AS NEEDED
Status: DISCONTINUED | OUTPATIENT
Start: 2019-05-14 | End: 2019-05-14 | Stop reason: SURG

## 2019-05-14 RX ORDER — DIPHENHYDRAMINE HYDROCHLORIDE 50 MG/ML
12.5 INJECTION INTRAMUSCULAR; INTRAVENOUS
Status: DISCONTINUED | OUTPATIENT
Start: 2019-05-14 | End: 2019-05-14 | Stop reason: HOSPADM

## 2019-05-14 RX ORDER — MIDAZOLAM HYDROCHLORIDE 1 MG/ML
2 INJECTION INTRAMUSCULAR; INTRAVENOUS
Status: DISCONTINUED | OUTPATIENT
Start: 2019-05-14 | End: 2019-05-14 | Stop reason: HOSPADM

## 2019-05-14 RX ORDER — EPHEDRINE SULFATE 50 MG/ML
5 INJECTION, SOLUTION INTRAVENOUS ONCE AS NEEDED
Status: DISCONTINUED | OUTPATIENT
Start: 2019-05-14 | End: 2019-05-14 | Stop reason: HOSPADM

## 2019-05-14 RX ORDER — PROMETHAZINE HYDROCHLORIDE 25 MG/1
25 SUPPOSITORY RECTAL ONCE AS NEEDED
Status: DISCONTINUED | OUTPATIENT
Start: 2019-05-14 | End: 2019-05-14 | Stop reason: HOSPADM

## 2019-05-14 RX ORDER — ONDANSETRON 2 MG/ML
4 INJECTION INTRAMUSCULAR; INTRAVENOUS ONCE AS NEEDED
Status: DISCONTINUED | OUTPATIENT
Start: 2019-05-14 | End: 2019-05-14 | Stop reason: HOSPADM

## 2019-05-14 RX ORDER — CEFAZOLIN SODIUM 2 G/100ML
2 INJECTION, SOLUTION INTRAVENOUS ONCE
Status: COMPLETED | OUTPATIENT
Start: 2019-05-14 | End: 2019-05-14

## 2019-05-14 RX ORDER — ACETAMINOPHEN 650 MG
TABLET, EXTENDED RELEASE ORAL AS NEEDED
Status: DISCONTINUED | OUTPATIENT
Start: 2019-05-14 | End: 2019-05-14 | Stop reason: HOSPADM

## 2019-05-14 RX ORDER — SODIUM CHLORIDE, SODIUM LACTATE, POTASSIUM CHLORIDE, CALCIUM CHLORIDE 600; 310; 30; 20 MG/100ML; MG/100ML; MG/100ML; MG/100ML
9 INJECTION, SOLUTION INTRAVENOUS CONTINUOUS
Status: DISCONTINUED | OUTPATIENT
Start: 2019-05-14 | End: 2019-05-14 | Stop reason: HOSPADM

## 2019-05-14 RX ORDER — FLUMAZENIL 0.1 MG/ML
0.2 INJECTION INTRAVENOUS AS NEEDED
Status: DISCONTINUED | OUTPATIENT
Start: 2019-05-14 | End: 2019-05-14 | Stop reason: HOSPADM

## 2019-05-14 RX ORDER — PROMETHAZINE HYDROCHLORIDE 25 MG/ML
12.5 INJECTION, SOLUTION INTRAMUSCULAR; INTRAVENOUS ONCE AS NEEDED
Status: DISCONTINUED | OUTPATIENT
Start: 2019-05-14 | End: 2019-05-14 | Stop reason: HOSPADM

## 2019-05-14 RX ORDER — LIDOCAINE HYDROCHLORIDE 20 MG/ML
INJECTION, SOLUTION INFILTRATION; PERINEURAL AS NEEDED
Status: DISCONTINUED | OUTPATIENT
Start: 2019-05-14 | End: 2019-05-14 | Stop reason: SURG

## 2019-05-14 RX ORDER — EPHEDRINE SULFATE 50 MG/ML
INJECTION, SOLUTION INTRAVENOUS AS NEEDED
Status: DISCONTINUED | OUTPATIENT
Start: 2019-05-14 | End: 2019-05-14 | Stop reason: SURG

## 2019-05-14 RX ORDER — ROPIVACAINE HYDROCHLORIDE 5 MG/ML
INJECTION, SOLUTION EPIDURAL; INFILTRATION; PERINEURAL
Status: COMPLETED | OUTPATIENT
Start: 2019-05-14 | End: 2019-05-14

## 2019-05-14 RX ORDER — HYDROCODONE BITARTRATE AND ACETAMINOPHEN 7.5; 325 MG/1; MG/1
1 TABLET ORAL ONCE AS NEEDED
Status: DISCONTINUED | OUTPATIENT
Start: 2019-05-14 | End: 2019-05-14 | Stop reason: HOSPADM

## 2019-05-14 RX ORDER — DIPHENHYDRAMINE HCL 25 MG
25 CAPSULE ORAL
Status: DISCONTINUED | OUTPATIENT
Start: 2019-05-14 | End: 2019-05-14 | Stop reason: HOSPADM

## 2019-05-14 RX ORDER — OXYCODONE AND ACETAMINOPHEN 7.5; 325 MG/1; MG/1
1 TABLET ORAL ONCE AS NEEDED
Status: DISCONTINUED | OUTPATIENT
Start: 2019-05-14 | End: 2019-05-14 | Stop reason: HOSPADM

## 2019-05-14 RX ORDER — HYDROMORPHONE HYDROCHLORIDE 1 MG/ML
0.5 INJECTION, SOLUTION INTRAMUSCULAR; INTRAVENOUS; SUBCUTANEOUS
Status: DISCONTINUED | OUTPATIENT
Start: 2019-05-14 | End: 2019-05-14 | Stop reason: HOSPADM

## 2019-05-14 RX ORDER — MIDAZOLAM HYDROCHLORIDE 1 MG/ML
1 INJECTION INTRAMUSCULAR; INTRAVENOUS
Status: DISCONTINUED | OUTPATIENT
Start: 2019-05-14 | End: 2019-05-14 | Stop reason: HOSPADM

## 2019-05-14 RX ORDER — IBUPROFEN 600 MG/1
600 TABLET ORAL EVERY 6 HOURS PRN
Qty: 60 TABLET | Refills: 0 | Status: SHIPPED | OUTPATIENT
Start: 2019-05-14 | End: 2019-07-29

## 2019-05-14 RX ORDER — PROMETHAZINE HYDROCHLORIDE 25 MG/1
25 TABLET ORAL ONCE AS NEEDED
Status: DISCONTINUED | OUTPATIENT
Start: 2019-05-14 | End: 2019-05-14 | Stop reason: HOSPADM

## 2019-05-14 RX ORDER — PROPOFOL 10 MG/ML
VIAL (ML) INTRAVENOUS AS NEEDED
Status: DISCONTINUED | OUTPATIENT
Start: 2019-05-14 | End: 2019-05-14 | Stop reason: SURG

## 2019-05-14 RX ORDER — NALOXONE HCL 0.4 MG/ML
0.2 VIAL (ML) INJECTION AS NEEDED
Status: DISCONTINUED | OUTPATIENT
Start: 2019-05-14 | End: 2019-05-14 | Stop reason: HOSPADM

## 2019-05-14 RX ORDER — LIDOCAINE HYDROCHLORIDE 10 MG/ML
0.5 INJECTION, SOLUTION EPIDURAL; INFILTRATION; INTRACAUDAL; PERINEURAL ONCE AS NEEDED
Status: DISCONTINUED | OUTPATIENT
Start: 2019-05-14 | End: 2019-05-14 | Stop reason: HOSPADM

## 2019-05-14 RX ADMIN — SODIUM CHLORIDE, POTASSIUM CHLORIDE, SODIUM LACTATE AND CALCIUM CHLORIDE 9 ML/HR: 600; 310; 30; 20 INJECTION, SOLUTION INTRAVENOUS at 08:33

## 2019-05-14 RX ADMIN — PROPOFOL 200 MG: 10 INJECTION, EMULSION INTRAVENOUS at 10:55

## 2019-05-14 RX ADMIN — MIDAZOLAM 2 MG: 1 INJECTION INTRAMUSCULAR; INTRAVENOUS at 10:00

## 2019-05-14 RX ADMIN — MEPIVACAINE HYDROCHLORIDE 10 ML: 15 INJECTION, SOLUTION EPIDURAL; INFILTRATION at 10:17

## 2019-05-14 RX ADMIN — CEFAZOLIN SODIUM 2 G: 2 INJECTION, SOLUTION INTRAVENOUS at 11:14

## 2019-05-14 RX ADMIN — EPHEDRINE SULFATE 10 MG: 50 INJECTION INTRAMUSCULAR; INTRAVENOUS; SUBCUTANEOUS at 11:14

## 2019-05-14 RX ADMIN — LIDOCAINE HYDROCHLORIDE 100 MG: 20 INJECTION, SOLUTION INFILTRATION; PERINEURAL at 10:55

## 2019-05-14 RX ADMIN — ROPIVACAINE HYDROCHLORIDE 20 ML: 5 INJECTION, SOLUTION EPIDURAL; INFILTRATION; PERINEURAL at 10:17

## 2019-05-14 RX ADMIN — FAMOTIDINE 20 MG: 10 INJECTION INTRAVENOUS at 09:18

## 2019-05-14 RX ADMIN — ONDANSETRON 4 MG: 2 INJECTION INTRAMUSCULAR; INTRAVENOUS at 11:32

## 2019-05-14 RX ADMIN — EPHEDRINE SULFATE 10 MG: 50 INJECTION INTRAMUSCULAR; INTRAVENOUS; SUBCUTANEOUS at 11:06

## 2019-05-14 RX ADMIN — IPRATROPIUM BROMIDE AND ALBUTEROL SULFATE 3 ML: 2.5; .5 SOLUTION RESPIRATORY (INHALATION) at 10:23

## 2019-05-14 RX ADMIN — GLYCOPYRROLATE 0.2 MG: 0.2 INJECTION INTRAMUSCULAR; INTRAVENOUS at 11:21

## 2019-05-14 NOTE — OP NOTE
Orthopaedic Operative Note    Facility: [facility]    Patient: Azael Carmen    Medical Record Number: [id number]    YOB: 1965    Dictating Surgeon: Bryan Flores M.D.    Primary Care Physician: Ibis Lacey PA    Date of Operation: 05/14/19    PREOPERATIVE DIAGNOSIS: Left carpal tunnel syndrome    POSTOPERATIVE DIAGNOSIS: Left carpal tunnel syndrome    PROCEDURE PERFORMED:  Left carpal tunnel release    SURGEON: Bryan Flores MD     ASSISTANT: KATERINA Lopez    ANESTHESIA: Regional followed by general    COMPLICATIONS: None.     ESTIMATED BLOOD LOSS: Less than 25 mL.     Brief Operative Indication:  The patient had a long history of left carpal tunnel syndrome which had been progressively nonresponsive to conservative treatment.  We talked about surgical and non-surgical treatment options.  The patient was felt to be a candidate for a carpal tunnel release.   I explained that surgical risks include infection, hematoma, persistent pain, loss of motion, iatrogenic nerve and/or blood vessel injury resulting in permanent weakness, numbness or dysfunction (particularly the motor recurrent branch of the median nerve), DVT, PE, positioning related neuropraxia, and anesthesia related complications resulting in death.     Description of Procedure in Detail:  The patient and operative site were identified in the preoperative holding area.  The surgical site was marked.  Preoperative antibiotics were administered.  Regional anesthesia was administered in the preoperative area.  He was taken to the operating room and then general anesthesia was administered.  The patient was repositioned on the operating table.  The left upper extremity was prepped and draped in the standard sterile fashion.  I cleaned the extremity with an alcohol solution.  The extremity was then prepped with Hibiclens followed by 2 ChloraPreps.  I allowed the ChloraPreps to dry for 3 minutes before the draping  procedure was carried out.    A timeout was taken prior to surgical incision.  I began the procedure by fashioning an approximately 3 cm incision over the volar aspect of the wrist centered over the carpal tunnel and transverse carpal ligament.  Full-thickness skin flaps were developed.  The palmar cutaneous fascia was divided and the transverse carpal ligament exposed.  Under direct visualization, taking care to watch out for any aberrant anatomy of the motor recurrent branch of the median nerve, the midportion of the ligament was sharply divided down to the point I could visualize the nerve.  Retractors were then repositioned distally.      Under direct visualization, the distal extent of the ligament was then released sharply.  I repositioned the retractors proximally.  Once again, under direct visualization, released the proximal extent of the ligament.  Throughout the case, I did exercise great caution to avoid iatrogenic injury to the motor recurrent branch of the median nerve.  No aberrant anatomy was identified.  I confirmed that the ligament was completely released and the nerve completely decompressed.  I then directed my attention to closure.    The wound was copiously irrigated out with sterile saline and closed in a layered fashion using Vicryl for the deep tissues and nylon for the skin.  Sterile dressings were applied to the wounds and the drapes withdrawn.  The patient was awakened and transferred to the recovery room.  The patient tolerated the procedure well.  There were no complications.    Bryan Flores MD    05/14/19

## 2019-05-14 NOTE — ANESTHESIA PROCEDURE NOTES
Peripheral Block    Pre-sedation assessment completed: 5/14/2019 10:17 AM    Patient reassessed immediately prior to procedure    Patient location during procedure: pre-op  Start time: 5/14/2019 10:17 AM  Stop time: 5/14/2019 10:27 AM  Reason for block: at surgeon's request and post-op pain management  Performed by  Anesthesiologist: Gerard Gramajo MD  Preanesthetic Checklist  Completed: patient identified, site marked, surgical consent, pre-op evaluation, timeout performed, IV checked, risks and benefits discussed and monitors and equipment checked  Prep:  Sterile barriers:cap, gloves, gown, mask and sterile barriers  Prep: ChloraPrep  Patient monitoring: blood pressure monitoring, continuous pulse oximetry and EKG  Procedure  Sedation:yes    Guidance:ultrasound guided  ULTRASOUND INTERPRETATION.  Using ultrasound guidance a 21 G gauge needle was placed in close proximity to the brachial plexus nerve, at which point, under ultrasound guidance anesthetic was injected in the area of the nerve and spread of the anesthesia was seen on ultrasound in close proximity thereto.  There were no abnormalities seen on ultrasound; a digital image was taken; and the patient tolerated the procedure with no complications. Images:still images obtained    Laterality:left  Block Type:supraclavicular  Injection Technique:single-shot  Needle Type:echogenic  Needle Gauge:21 G    Medications Used: ropivacaine (NAROPIN) 0.5 % injection, 20 mL  mepivacaine (CARBOCAINE) 1.5 % injection, 10 mL  Post Assessment  Injection Assessment: negative aspiration for heme, no paresthesia on injection and incremental injection  Patient Tolerance:comfortable throughout block  Complications:no

## 2019-05-14 NOTE — NURSING NOTE
"Notified Dr. Gramajo that pt takes vivitrol IM injections and pt states \"I cannot take any opioid medications.\"  Per Dr. Gramajo, do not give pt Fentanyl, notified carlyle Solis RN.   "

## 2019-05-14 NOTE — BRIEF OP NOTE
CARPAL TUNNEL RELEASE  Progress Note    Azael Carmen  5/14/2019    Pre-op Diagnosis:   Left carpal tunnel syndrome [G56.02]       Post-Op Diagnosis Codes:     * Left carpal tunnel syndrome [G56.02]    Procedure/CPT® Codes:      Procedure(s):  CARPAL TUNNEL RELEASE    Surgeon(s):  Bryan Flores MD    Anesthesia: Regional    Staff:   Circulator: Lina Jacobson RN  Scrub Person: Corey Lawrence  Assistant: Hafsa Turner APRN    Estimated Blood Loss: 1 mL    Urine Voided: * No values recorded between 5/14/2019 10:50 AM and 5/14/2019 11:48 AM *    Specimens:                None          Drains:      Findings: see dictation    Complications: none      Bryan Flores MD     Date: 5/14/2019  Time: 12:09 PM

## 2019-05-14 NOTE — H&P
Patient: Azael Carmen     YOB: 1965     Medical Record Number: [id number]     Chief Complaints:   Left hand pain, numbness and weakness     History of Present Illness:      53 y.o. male patient who comes in today in anticipation of his left carpal tunnel release.  Denies any new problems or issues.  He continues to have pain and constant numbness.  He also reports subjective weakness.     Allergies:        Allergies   Allergen Reactions   • Cymbalta [Duloxetine Hcl] Hives   • Penicillins Rash         Home Medications:        Current Outpatient Medications:   •  albuterol (ACCUNEB) 1.25 MG/3ML nebulizer solution, Take 3 mL by nebulization Every 6 (Six) Hours As Needed for Wheezing., Disp: 120 vial, Rfl: 0  •  aspirin 81 MG EC tablet, TAKE 1 TABLET BY MOUTH EVERY DAY, Disp: 30 tablet, Rfl: 11  •  atorvastatin (LIPITOR) 20 MG tablet, Take 1 tablet by mouth Daily., Disp: 30 tablet, Rfl: 1  •  CYCLOSET 0.8 MG tablet, Take 6 tablets by mouth Daily With Breakfast., Disp: 180 tablet, Rfl: 5  •  fluticasone (FLONASE) 50 MCG/ACT nasal spray, 1 spray into the nostril(s) as directed by provider Daily., Disp: 1 bottle, Rfl: 2  •  Fluticasone Furoate-Vilanterol (BREO ELLIPTA) 100-25 MCG/INH inhaler, 1 PUFF ONCE DAILY THEN RINSE MOUTH, Disp: 1 each, Rfl: 0  •  glucose blood (ASSURE PLATINUM) test strip, Use as instructed, Disp: 100 each, Rfl: 12  •  hydrOXYzine (ATARAX) 50 MG tablet, Take 1 tablet by mouth Every Night., Disp: 30 tablet, Rfl: 0  •  Insulin Pen Needle (PEN NEEDLES) 31G X 6 MM misc, One inj daily, Disp: 60 each, Rfl: 4  •  Lancet Device misc, Test glucose 3-4 x daily, Disp: 100 each, Rfl: 2  •  Levalbuterol Tartrate (XOPENEX HFA IN), Inhale 2 puffs 2 (Two) Times a Day As Needed., Disp: , Rfl:   •  losartan (COZAAR) 100 MG tablet, Take 1 tablet by mouth Daily., Disp: 30 tablet, Rfl: 1  •  methotrexate 2.5 MG tablet, Take  by mouth 3 (Three) Doses Each Week. Take Doses 12 (Twelve) Hours Apart. 6 tablets  once a week, Disp: , Rfl:   •  montelukast (SINGULAIR) 10 MG tablet, Take 10 mg by mouth Every Night., Disp: , Rfl:   •  Multiple Vitamins-Minerals (V-R COMPLETE SENIOR) tablet, Take 1 tablet by mouth Daily., Disp: 90 each, Rfl: 0  •  Naltrexone (VIVITROL IM), Inject  into the appropriate muscle as directed by prescriber Every 30 (Thirty) Days., Disp: , Rfl:   •  potassium chloride (K-DUR,KLOR-CON) 10 MEQ CR tablet, Take 1 tablet by mouth 2 (Two) Times a Day., Disp: 60 tablet, Rfl: 0  •  sertraline (ZOLOFT) 50 MG tablet, Take 3 tablets by mouth Daily., Disp: 90 tablet, Rfl: 2  •  SYNJARDY XR 12.5-1000 MG tablet sustained-release 24 hour, Take 2 tablets by mouth Daily With Breakfast., Disp: 60 tablet, Rfl: 5  •  tamsulosin (FLOMAX) 0.4 MG capsule 24 hr capsule, Take 1 capsule by mouth Daily., Disp: 30 capsule, Rfl: 0  •  TRESIBA FLEXTOUCH 200 UNIT/ML solution pen-injector, Inject 100 Units under the skin into the appropriate area as directed Daily With Breakfast., Disp: 6 pen, Rfl: 5  •  TRULICITY 1.5 MG/0.5ML solution pen-injector, Inject 1.5 mg under the skin into the appropriate area as directed 1 (One) Time Per Week., Disp: 4 pen, Rfl: 5  •  vitamin B-12 (CYANOCOBALAMIN) 1000 MCG tablet, Take 1,000 mcg by mouth Daily., Disp: , Rfl:   •  vitamin D (ERGOCALCIFEROL) 20697 units capsule capsule, Take 1 capsule by mouth Every 7 (Seven) Days., Disp: 4 capsule, Rfl: 4  •  furosemide (LASIX) 40 MG tablet, Take 40 mg by mouth 2 (Two) Times a Day., Disp: , Rfl:   •  ticagrelor (BRILINTA) 90 MG tablet tablet, Take 1 tablet by mouth 2 (Two) Times a Day., Disp: 120 tablet, Rfl: 6     Past Medical History:   Diagnosis Date   • Allergic rhinitis     • Anxiety     • Arthritis     • Asthma     • Chest pain in adult     • COPD (chronic obstructive pulmonary disease) (CMS/HCC)     • Depression     • Diabetes mellitus (CMS/HCC)     • Erectile dysfunction     • GERD (gastroesophageal reflux disease)     • Heart murmur     •  Hypertension     • Mixed hyperlipidemia 2016   • Sleep apnea                 Past Surgical History:   Procedure Laterality Date   • CARDIAC CATHETERIZATION N/A 2016     Procedure: Coronary angiography;  Surgeon: Blaise Burroughs MD;  Location: [facility] CATH INVASIVE LOCATION;  Service:    • CARDIAC CATHETERIZATION N/A 2016     Procedure: Left Heart Cath;  Surgeon: Blaise Burroughs MD;  Location: [facility] CATH INVASIVE LOCATION;  Service:    • CARDIAC CATHETERIZATION N/A 2016     Procedure: Left ventriculography;  Surgeon: Blaise Burroughs MD;  Location: [facility] CATH INVASIVE LOCATION;  Service:    • CARDIAC CATHETERIZATION N/A 10/7/2016     Procedure: Chronic Total Occlussion;  Surgeon: Blaise Burroughs MD;  Location: [facility] CATH INVASIVE LOCATION;  Service:    • VASECTOMY             Social History            Occupational History   • Not on file   Tobacco Use   • Smoking status: Former Smoker       Packs/day: 0.50       Years: 40.00       Pack years: 20.00       Types: Cigarettes       Last attempt to quit: 4/10/2016       Years since quittin.9   • Smokeless tobacco: Never Used   Substance and Sexual Activity   • Alcohol use: No   • Drug use: No   • Sexual activity: Defer      Social History      Social History Narrative   • Not on file               Family History   Problem Relation Age of Onset   • Hypertension Father     • Heart disease Father     • Heart attack Father     • Hypertension Brother     • Cancer Paternal Aunt     • Cancer Maternal Grandmother     • Cancer Maternal Grandfather     • Cancer Paternal Grandmother     • Cancer Paternal Grandfather           Review of Systems:      Constitutional: Denies fever, shaking or chills   Eyes: Denies change in visual acuity   HEENT: Denies nasal congestion or sore throat   Respiratory: Denies cough or shortness of breath   Cardiovascular: Denies chest pain or edema  Endocrine: Denies tremors, palpitations, intolerance of heat  or cold, polyuria, polydipsia.  GI: Denies abdominal pain, nausea, vomiting, bloody stools or diarrhea  : Denies frequency, urgency, incontinence, retention, or nocturia.  Musculoskeletal: Denies numbness tingling or loss of motor function except as above  Integument: Denies rash, lesion or ulceration   Neurologic: Denies headache or focal weakness, deficits  Heme: Denies epistaxis, spontaneous or excessive bleeding, epistaxis, hematuria, melena, fatigue, enlarged or tender lymph nodes.      All other pertinent positives and negatives as noted above in HPI.        Physical Exam: 53 y.o. male     General:  Patient is awake and alert.  Appears in no acute distress or discomfort.     Psych:  Affect and demeanor are appropriate.     Eyes:  Conjunctiva and sclera appear grossly normal.  Eyes track well and EOM seem to be intact.     Ears:  No gross abnormalities.  Hearing adequate for the exam.     Cardiovascular:  Regular rate and rhythm.     Lungs:  Good chest expansion.  Breathing unlabored.     Extremities:       Left upper extremity is examined.  Skin is benign about the hand and wrist.  No atrophy, swelling, or masses.  No palpable adenopathy in the extremity.  No focal areas of exquisite tenderness.  Positive Phalen's maneuver over the carpal tunnel.  No evident instability of the wrist or thumb.  Good , pinch, finger and thumb abduction strength.  Subjectively diminished sensation in the thumb, index and middle finger.  Palpable radial pulse.  Brisk capillary refill.  Good skin turgor.     Assessment/Plan: Left carpal tunnel syndrome     We discussed all available treatments for this condition in detail, both surgical and non-surgical.  With regards to conservative treatment, we discussed anti-inflammatories, injections, and use of a night splint. Given the presence of constant numbness and subjective weakness, I have recommended that we consider surgery to hopefully prevent permanent nerve damage.  We  discussed the surgery and all that entails including all risks, benefits, and alternatives.  The risks discussed included infection, wound healing problems, hematoma, persistent pain/numbness/weakness despite the surgery, iatrogenic damage to the median and/or motor recurrent branch of the median nerve which could result in permanent weakness, numbness and dysfunction.  We also talked about positioning related neuropraxia, RSD, DVT, PE and death as well.  No guarrantees were given regarding the results of the procedure.  The patient has acknowledged understanding and consents to proceed.      Bryan Flores MD

## 2019-05-14 NOTE — ANESTHESIA PROCEDURE NOTES
Airway  Urgency: elective    Date/Time: 5/14/2019 10:58 AM  Airway not difficult    General Information and Staff    Patient location during procedure: OR  Anesthesiologist: Edward Salcedo MD  CRNA: Martha Olmedo CRNA    Indications and Patient Condition  Indications for airway management: airway protection    Preoxygenated: yes  MILS maintained throughout  Mask difficulty assessment: 1 - vent by mask    Final Airway Details  Final airway type: supraglottic airway      Successful airway: classic  Size 5    Number of attempts at approach: 1    Additional Comments  Patient in OR. Monitors on. BLVS. Pre 02 100%. SIVI. LMA placed with ease. No leak present. BBS and ETCO2 present. Secured. Teeth/lips as preop.

## 2019-05-14 NOTE — ANESTHESIA PREPROCEDURE EVALUATION
Anesthesia Evaluation     Patient summary reviewed and Nursing notes reviewed                Airway   Mallampati: III  Dental      Pulmonary    (+) a smoker Former, COPD, asthma, sleep apnea on CPAP,   Cardiovascular     Rhythm: regular  Rate: normal    (+) hypertension, valvular problems/murmurs murmur, CAD, cardiac stents more than 12 months ago hyperlipidemia,       Neuro/Psych  (+) numbness, psychiatric history Anxiety and Depression,     GI/Hepatic/Renal/Endo    (+) morbid obesity,  diabetes mellitus type 2 poorly controlled,     Musculoskeletal     (+) neck pain,   Abdominal    Substance History - negative use     OB/GYN negative ob/gyn ROS         Other   (+) arthritis                     Anesthesia Plan    ASA 3     general with block   (GA with LMA  Did not bring his CPAP  Oneyda op risks increased secondary to pre existing , numerous medical pathos  Wheezy today to treat with inhaler pre op  Supraclavicular block PSR/ narcotic avoidance technique)  intravenous induction   Anesthetic plan, all risks, benefits, and alternatives have been provided, discussed and informed consent has been obtained with: patient.

## 2019-05-14 NOTE — ANESTHESIA POSTPROCEDURE EVALUATION
Patient: Azael Carmen    Procedure Summary     Date:  05/14/19 Room / Location:   [facility]  [facility]    Anesthesia Start:  1053 Anesthesia Stop:  1151    Procedure:  CARPAL TUNNEL RELEASE (Left Wrist) Diagnosis:       Left carpal tunnel syndrome      (Left carpal tunnel syndrome [G56.02])    Surgeon:  Bryan Flores MD Provider:  Edward Salcedo MD    Anesthesia Type:  general ASA Status:  3          Anesthesia Type: general  Last vitals  BP   130/79 (05/14/19 1230)   Temp   36.6 °C (97.8 °F) (05/14/19 1149)   Pulse   54 (05/14/19 1230)   Resp   16 (05/14/19 1230)     SpO2   95 % (05/14/19 1230)     Post Anesthesia Care and Evaluation    Patient location during evaluation: PACU  Patient participation: complete - patient participated  Level of consciousness: awake and alert  Pain management: adequate  Airway patency: patent  Anesthetic complications: No anesthetic complications    Cardiovascular status: acceptable  Respiratory status: acceptable  Hydration status: acceptable    Comments: --------------------            05/14/19               1230     --------------------   BP:       130/79     Pulse:      54       Resp:       16       Temp:                SpO2:      95%      --------------------

## 2019-05-15 ENCOUNTER — TELEPHONE (OUTPATIENT)
Dept: ORTHOPEDIC SURGERY | Facility: CLINIC | Age: 54
End: 2019-05-15

## 2019-05-17 NOTE — TELEPHONE ENCOUNTER
Left answering machine message for patient to call on 5/15.  5/17 Spoke with patient today and he is doing fine post op. Have scheduled f/u appt on 5/29/19 @ 10:00. Advised to call with any questions or problems.

## 2019-05-20 RX ORDER — FOLIC ACID/MV,IRON,MIN/LUTEIN 0.4-18-25
TABLET ORAL
Qty: 90 TABLET | Refills: 0 | Status: SHIPPED | OUTPATIENT
Start: 2019-05-20 | End: 2021-01-06 | Stop reason: SDUPTHER

## 2019-05-20 RX ORDER — TRIAMCINOLONE ACETONIDE 55 UG/1
SPRAY, METERED NASAL
Qty: 16.9 ML | Refills: 0 | OUTPATIENT
Start: 2019-05-20

## 2019-05-29 ENCOUNTER — OFFICE VISIT (OUTPATIENT)
Dept: ORTHOPEDIC SURGERY | Facility: CLINIC | Age: 54
End: 2019-05-29

## 2019-05-29 VITALS — BODY MASS INDEX: 38.65 KG/M2 | HEIGHT: 70 IN | TEMPERATURE: 98.5 F | WEIGHT: 270 LBS

## 2019-05-29 DIAGNOSIS — Z09 SURGERY FOLLOW-UP: Primary | ICD-10-CM

## 2019-05-29 PROCEDURE — 99024 POSTOP FOLLOW-UP VISIT: CPT | Performed by: NURSE PRACTITIONER

## 2019-05-29 NOTE — PROGRESS NOTES
Azael Carmen : 1965 MRN: [id number] DATE: 2019    CC: 2 weeks s/p left carpal tunnel release    HPI: Pt. returns to clinic today stating pain is improving.  Denies fevers, chills, sweats.  No complaints.  Pain is well-controlled.    Vitals:    19 1003   Temp: 98.5 °F (36.9 °C)      Exam:   Splint was in place and subsequently removed.  Incision appears well approximated and benign.  No erythema, drainage or increased warmth.  Able to flex and extend wrist with minimal discomfort.  Good motor function in hand.  Brisk cap refill.    Imaging    none    Impression:  2 weeks s/p left carpal tunnel release    Plan:    1.  Sutures removed and replaced with steri strips.  2.  Counseled patient about appropriate activity modifications and restrictions, including no soaking wound.  3.  Follow up in 4 weeks for final recheck.    KATERINA Sosa     2019

## 2019-06-06 RX ORDER — TRIAMCINOLONE ACETONIDE 55 UG/1
SPRAY, METERED NASAL
Qty: 16.9 ML | Refills: 0 | Status: SHIPPED | OUTPATIENT
Start: 2019-06-06 | End: 2022-12-19

## 2019-07-01 RX ORDER — FUROSEMIDE 40 MG/1
40 TABLET ORAL DAILY
Qty: 30 TABLET | Refills: 0 | Status: SHIPPED | OUTPATIENT
Start: 2019-07-01 | End: 2021-01-06 | Stop reason: SDUPTHER

## 2019-07-02 ENCOUNTER — RESULTS ENCOUNTER (OUTPATIENT)
Dept: ENDOCRINOLOGY | Age: 54
End: 2019-07-02

## 2019-07-02 DIAGNOSIS — N52.9 ED (ERECTILE DYSFUNCTION) OF ORGANIC ORIGIN: ICD-10-CM

## 2019-07-02 DIAGNOSIS — I25.10 CORONARY ARTERY DISEASE INVOLVING NATIVE CORONARY ARTERY OF NATIVE HEART WITHOUT ANGINA PECTORIS: ICD-10-CM

## 2019-07-02 DIAGNOSIS — R53.82 CHRONIC FATIGUE: ICD-10-CM

## 2019-07-02 DIAGNOSIS — E11.65 UNCONTROLLED TYPE 2 DIABETES MELLITUS WITH HYPERGLYCEMIA (HCC): ICD-10-CM

## 2019-07-02 DIAGNOSIS — E29.1 HYPOGONADISM IN MALE: ICD-10-CM

## 2019-07-02 DIAGNOSIS — E78.2 MIXED HYPERLIPIDEMIA: ICD-10-CM

## 2019-07-02 DIAGNOSIS — E55.9 VITAMIN D DEFICIENCY: ICD-10-CM

## 2019-07-02 DIAGNOSIS — E66.01 CLASS 3 SEVERE OBESITY WITHOUT SERIOUS COMORBIDITY WITH BODY MASS INDEX (BMI) OF 40.0 TO 44.9 IN ADULT, UNSPECIFIED OBESITY TYPE (HCC): ICD-10-CM

## 2019-07-03 LAB
25(OH)D3+25(OH)D2 SERPL-MCNC: 29.9 NG/ML (ref 30–100)
ALBUMIN SERPL-MCNC: 4.4 G/DL (ref 3.5–5.2)
ALBUMIN/GLOB SERPL: 2 G/DL
ALP SERPL-CCNC: 84 U/L (ref 39–117)
ALT SERPL-CCNC: 11 U/L (ref 1–41)
AST SERPL-CCNC: 10 U/L (ref 1–40)
BILIRUB SERPL-MCNC: 1.1 MG/DL (ref 0.2–1.2)
BUN SERPL-MCNC: 18 MG/DL (ref 6–20)
BUN/CREAT SERPL: 18.9 (ref 7–25)
C PEPTIDE SERPL-MCNC: 3.6 NG/ML (ref 1.1–4.4)
CALCIUM SERPL-MCNC: 9.3 MG/DL (ref 8.6–10.5)
CHLORIDE SERPL-SCNC: 102 MMOL/L (ref 98–107)
CHOLEST SERPL-MCNC: 131 MG/DL (ref 0–200)
CO2 SERPL-SCNC: 28.6 MMOL/L (ref 22–29)
CREAT SERPL-MCNC: 0.95 MG/DL (ref 0.76–1.27)
GLOBULIN SER CALC-MCNC: 2.2 GM/DL
GLUCOSE SERPL-MCNC: 145 MG/DL (ref 65–99)
HBA1C MFR BLD: 6.5 % (ref 4.8–5.6)
HCT VFR BLD AUTO: 48.5 % (ref 37.5–51)
HDLC SERPL-MCNC: 42 MG/DL (ref 40–60)
HGB BLD-MCNC: 14.8 G/DL (ref 13–17.7)
INTERPRETATION: NORMAL
LDLC SERPL CALC-MCNC: 60 MG/DL (ref 0–100)
Lab: NORMAL
POTASSIUM SERPL-SCNC: 4.6 MMOL/L (ref 3.5–5.2)
PROT SERPL-MCNC: 6.6 G/DL (ref 6–8.5)
SHBG SERPL-SCNC: 45.8 NMOL/L (ref 19.3–76.4)
SODIUM SERPL-SCNC: 142 MMOL/L (ref 136–145)
T4 SERPL-MCNC: 6.3 MCG/DL (ref 4.5–11.7)
TESTOST FREE SERPL-MCNC: 9.1 PG/ML (ref 7.2–24)
TESTOST SERPL-MCNC: 295 NG/DL (ref 264–916)
TRIGL SERPL-MCNC: 143 MG/DL (ref 0–150)
TSH SERPL DL<=0.005 MIU/L-ACNC: 0.8 MIU/ML (ref 0.27–4.2)
URATE SERPL-MCNC: 4.1 MG/DL (ref 3.4–7)
VLDLC SERPL CALC-MCNC: 28.6 MG/DL

## 2019-07-08 RX ORDER — ERGOCALCIFEROL 1.25 MG/1
CAPSULE ORAL
Qty: 4 CAPSULE | Refills: 4 | Status: SHIPPED | OUTPATIENT
Start: 2019-07-08 | End: 2019-07-16

## 2019-07-15 RX ORDER — LEVALBUTEROL TARTRATE 45 UG/1
AEROSOL, METERED ORAL
Qty: 15 G | Refills: 0 | Status: SHIPPED | OUTPATIENT
Start: 2019-07-15 | End: 2021-01-06 | Stop reason: SDUPTHER

## 2019-07-16 ENCOUNTER — OFFICE VISIT (OUTPATIENT)
Dept: ENDOCRINOLOGY | Age: 54
End: 2019-07-16

## 2019-07-16 ENCOUNTER — RESULTS ENCOUNTER (OUTPATIENT)
Dept: ENDOCRINOLOGY | Age: 54
End: 2019-07-16

## 2019-07-16 VITALS
SYSTOLIC BLOOD PRESSURE: 128 MMHG | WEIGHT: 261 LBS | DIASTOLIC BLOOD PRESSURE: 78 MMHG | BODY MASS INDEX: 37.37 KG/M2 | HEIGHT: 70 IN

## 2019-07-16 DIAGNOSIS — E78.2 MIXED HYPERLIPIDEMIA: Primary | ICD-10-CM

## 2019-07-16 DIAGNOSIS — E29.1 HYPOGONADISM IN MALE: ICD-10-CM

## 2019-07-16 DIAGNOSIS — E55.9 VITAMIN D DEFICIENCY: ICD-10-CM

## 2019-07-16 DIAGNOSIS — E11.65 UNCONTROLLED TYPE 2 DIABETES MELLITUS WITH HYPERGLYCEMIA (HCC): ICD-10-CM

## 2019-07-16 DIAGNOSIS — E78.2 MIXED HYPERLIPIDEMIA: ICD-10-CM

## 2019-07-16 DIAGNOSIS — E11.65 UNCONTROLLED TYPE 2 DIABETES MELLITUS WITH HYPERGLYCEMIA (HCC): Primary | ICD-10-CM

## 2019-07-16 DIAGNOSIS — N52.9 ED (ERECTILE DYSFUNCTION) OF ORGANIC ORIGIN: ICD-10-CM

## 2019-07-16 PROCEDURE — 99214 OFFICE O/P EST MOD 30 MIN: CPT | Performed by: NURSE PRACTITIONER

## 2019-07-16 RX ORDER — INSULIN DEGLUDEC 200 U/ML
100 INJECTION, SOLUTION SUBCUTANEOUS
Qty: 6 PEN | Refills: 5 | Status: SHIPPED | OUTPATIENT
Start: 2019-07-16 | End: 2020-12-30 | Stop reason: SDUPTHER

## 2019-07-16 RX ORDER — CHOLECALCIFEROL (VITAMIN D3) 1250 MCG
50000 CAPSULE ORAL 2 TIMES WEEKLY
Qty: 24 CAPSULE | Refills: 2 | Status: SHIPPED | OUTPATIENT
Start: 2019-07-18 | End: 2021-01-06 | Stop reason: SDUPTHER

## 2019-07-16 RX ORDER — TADALAFIL 5 MG/1
5 TABLET ORAL DAILY PRN
Qty: 30 TABLET | Refills: 4 | Status: SHIPPED | OUTPATIENT
Start: 2019-07-16 | End: 2021-02-01

## 2019-07-16 RX ORDER — ERTUGLIFLOZIN AND METFORMIN HYDROCHLORIDE 7.5; 1 MG/1; MG/1
2 TABLET, FILM COATED ORAL
Qty: 60 TABLET | Refills: 4 | Status: SHIPPED | OUTPATIENT
Start: 2019-07-16 | End: 2020-12-30 | Stop reason: SDUPTHER

## 2019-07-16 RX ORDER — DULAGLUTIDE 1.5 MG/.5ML
1.5 INJECTION, SOLUTION SUBCUTANEOUS WEEKLY
Qty: 4 PEN | Refills: 5 | Status: SHIPPED | OUTPATIENT
Start: 2019-07-16 | End: 2020-12-30 | Stop reason: SDUPTHER

## 2019-07-16 NOTE — PROGRESS NOTES
Azael Carmen  presents to the office  for the follow-up appointment for Type 2 diabetes mellitus.     The diabete's condition location is throughout the system with the  clinical course has fluctuated, the severity is Mild, and the modifying/allievating factors are insulin.  Medications and  Dosages were  reviewed with Azael Carmen and suggested that compliance most of the time.    The patient reports associated symptoms of hyperglycemia have been none and associated symptoms of hypoglycemia have been dizziness and jitteriness, with their  hypoglycemia threshold for symptoms is 90 mg/dl .     The patient is currently on insulin.     Compliance with blood glucose monitoring: good.     Meal panning: The patient is using avoidance of concentrated sweets.    The patient is currently taking home blood tests - Blood glucose testin times daily, that are:  fasting- 1st thing in morning before eating or drinking   basal insulIn  Tresiba U200 100 units in AM     Last instructions given were:  In summary I saw and examined this 53-year-old gentleman for above-mentioned problems.  I reviewed his laboratory evaluation of 2019 and at this time we will go ahead and order extensive laboratory evaluation once the results come back we will go ahead and call for any possible modification or new medications.  In the meantime I am going to go ahead and stop all his current antidiabetic medications including glipizide metformin as well as the 2 kinds of insulins and will start him on Toujeo 80 units every morning and every 3 days if his fasting blood glucose is greater than 110 increase the dose by 2 units every morning up to 100 units daily, Synjardy 1000 mg 2 tablets every morning for 4 weeks and then if no problem move up to 12,510 mg 2 tablets every morning, Trulicity 0.75 mg once weekly for 2 weeks and then move up to 1.5 mg weekly and finally Cycloset 1 tablet every morning for 7 days, 2, 3, 4, 5 tablets every  morning each for 7 days and after that 6 tablets every morning for maintenance.  This office visit lasted 65 minutes and 35-minute of it was a spent on face-to-face patient counseling as well as education and planning his future care moving forward.      Home blood glucose testing daily: 1  FB to 100 mg/dl    Last reported blood glucose readings are:None.    Patterns reported per patient are none.         The following portions of the patient's history were reviewed and updated as appropriate: current medications, past family history, past medical history, past social history, past surgical history and problem list.      Current Outpatient Medications:   •  albuterol (ACCUNEB) 1.25 MG/3ML nebulizer solution, Take 3 mL by nebulization Every 6 (Six) Hours As Needed for Wheezing., Disp: 120 vial, Rfl: 0  •  aspirin 81 MG tablet, Take 81 mg by mouth Daily. TO CHECK WITH MD REGARDING HELD DATE, Disp: , Rfl:   •  atorvastatin (LIPITOR) 20 MG tablet, Take 20 mg by mouth Daily., Disp: , Rfl:   •  Bromocriptine Mesylate (CYCLOSET) 0.8 MG tablet, Take 6 tablets by mouth Daily., Disp: , Rfl:   •  fluticasone (FLONASE) 50 MCG/ACT nasal spray, 1 spray into the nostril(s) as directed by provider Daily., Disp: 1 bottle, Rfl: 2  •  Fluticasone Furoate-Vilanterol (BREO ELLIPTA) 100-25 MCG/INH inhaler, Inhale 1 puff Daily., Disp: , Rfl:   •  furosemide (LASIX) 40 MG tablet, Take 1 tablet by mouth Daily., Disp: 30 tablet, Rfl: 0  •  glucose blood (ASSURE PLATINUM) test strip, Use as instructed, Disp: 100 each, Rfl: 12  •  hydrOXYzine (ATARAX) 50 MG tablet, Take 1 tablet by mouth Every Night., Disp: 30 tablet, Rfl: 0  •  ibuprofen (ADVIL,MOTRIN) 600 MG tablet, Take 1 tablet by mouth Every 6 (Six) Hours As Needed for Mild Pain ., Disp: 60 tablet, Rfl: 0  •  Insulin Pen Needle (PEN NEEDLES) 31G X 6 MM misc, One inj daily, Disp: 60 each, Rfl: 4  •  Lancet Device misc, Test glucose 3-4 x daily, Disp: 100 each, Rfl: 2  •  levalbuterol  (XOPENEX HFA) 45 MCG/ACT inhaler, INHALE 2 PUFFS BY MOUTH EVERY FOUR HOURS AS NEEDED, Disp: 15 g, Rfl: 0  •  Levalbuterol Tartrate (XOPENEX HFA IN), Inhale 2 puffs 2 (Two) Times a Day As Needed., Disp: , Rfl:   •  losartan (COZAAR) 100 MG tablet, Take 100 mg by mouth Daily., Disp: , Rfl:   •  methotrexate 2.5 MG tablet, Take 15 mg by mouth 1 (One) Time Per Week. FRIDAYS, Disp: , Rfl:   •  montelukast (SINGULAIR) 10 MG tablet, Take 10 mg by mouth Daily., Disp: , Rfl:   •  Multiple Vitamins-Minerals (CERTAVITE/ANTIOXIDANTS) tablet, TAKE 1 TABLET BY MOUTH EVERY DAY, Disp: 90 tablet, Rfl: 0  •  Naltrexone (VIVITROL) 380 MG reconstituted suspension IM suspension, Inject 380 mg into the appropriate muscle as directed by prescriber Every 30 (Thirty) Days., Disp: , Rfl:   •  ondansetron (ZOFRAN) 4 MG tablet, Take 1 tablet by mouth Every 8 (Eight) Hours As Needed for Nausea or Vomiting., Disp: 25 tablet, Rfl: 0  •  potassium chloride (K-DUR) 10 MEQ CR tablet, Take 10 mEq by mouth 2 (Two) Times a Day., Disp: , Rfl:   •  SEGLUROMET 7.5-1000 MG tablet, Take 2 tablets by mouth Daily With Breakfast., Disp: 60 tablet, Rfl: 4  •  sertraline (ZOLOFT) 100 MG tablet, Take 100 mg by mouth Every Night., Disp: , Rfl:   •  tamsulosin (FLOMAX) 0.4 MG capsule 24 hr capsule, Take 1 capsule by mouth Daily., Disp: , Rfl:   •  TRESIBA FLEXTOUCH 200 UNIT/ML solution pen-injector, Inject 100 Units under the skin into the appropriate area as directed Daily With Breakfast., Disp: 6 pen, Rfl: 5  •  Triamcinolone Acetonide (NASACORT) 55 MCG/ACT nasal inhaler, INHALE 2 SPRAYS INTO EACH NOSTRIL EVERY DAY, Disp: 16.9 mL, Rfl: 0  •  TRULICITY 1.5 MG/0.5ML solution pen-injector, Inject 1.5 mg under the skin into the appropriate area as directed 1 (One) Time Per Week., Disp: 4 pen, Rfl: 5  •  vitamin B-12 (CYANOCOBALAMIN) 1000 MCG tablet, Take 1,000 mcg by mouth Daily., Disp: , Rfl:   •  [START ON 7/18/2019] Cholecalciferol (VITAMIN D3) 20997 units  "capsule, Take 1 capsule by mouth 2 (Two) Times a Week., Disp: 24 capsule, Rfl: 2  •  tadalafil (CIALIS) 5 MG tablet, Take 1 tablet by mouth Daily As Needed for erectile dysfunction., Disp: 30 tablet, Rfl: 4    Patient Active Problem List    Diagnosis   • Left carpal tunnel syndrome [G56.02]   • Vitamin D deficiency [E55.9]   • Class 3 severe obesity without serious comorbidity with body mass index (BMI) of 40.0 to 44.9 in adult (CMS/[facility]) [E66.01, Z68.41]   • Coronary artery disease involving native coronary artery of native heart without angina pectoris [I25.10]   • Hypogonadism in male [E29.1]   • COPD, mild (CMS/[facility]) [J44.9]   • Chronic fatigue [R53.82]   • Elevated CPK [R74.8]   • Mixed hyperlipidemia [E78.2]   • Abnormal chest CT [R93.89]   • Arthritis [M19.90]   • Asthma [J45.909]   • Bulging of lumbar intervertebral disc [M51.26]   • Cervical disc disorder with radiculopathy of mid-cervical region [M50.120]   • Cervical radiculitis [M54.12]   • Cervicalgia [M54.2]   • Diabetes mellitus type 2, uncontrolled (CMS/[facility]) [E11.65]   • ED (erectile dysfunction) of organic origin [N52.9]   • Paresthesias [R20.2]   • Obstructive sleep apnea [G47.33]   • Chest pain in adult [R07.9]       Review of Systems   A comprehensive review of the 14 systems was negative except of listed below:  Genitourinary:positive for sexual problems and difficult getting and maintaining erections  Endocrine: hyperglycemia     Objective:     Wt Readings from Last 3 Encounters:   07/16/19 118 kg (261 lb)   05/29/19 122 kg (270 lb)   05/09/19 123 kg (271 lb 14.4 oz)     Temp Readings from Last 3 Encounters:   05/29/19 98.5 °F (36.9 °C)   05/14/19 97.8 °F (36.6 °C) (Oral)   05/09/19 98.3 °F (36.8 °C) (Oral)     BP Readings from Last 3 Encounters:   07/16/19 128/78   05/14/19 134/70   05/09/19 110/71     Pulse Readings from Last 3 Encounters:   05/14/19 53   05/09/19 83   04/09/19 52        /78   Ht 177.8 cm (70\")   Wt 118 kg (261 lb)   " BMI 37.45 kg/m²        Physical Exam   Constitutional: He is oriented to person, place, and time. He appears well-developed and well-nourished. No distress.   HENT:   Head: Normocephalic and atraumatic.   Eyes: EOM are normal. Pupils are equal, round, and reactive to light.   Neck: Normal range of motion. Neck supple. No thyromegaly present.   Cardiovascular: Normal rate, regular rhythm and intact distal pulses.   Murmur heard.  Systolic murmur grade 3 throughout the pericardium   Pulmonary/Chest: Effort normal and breath sounds normal.   Abdominal: Soft. Bowel sounds are normal.   Musculoskeletal: Normal range of motion.   Neurological: He is alert and oriented to person, place, and time.   Skin: Skin is warm and dry. Capillary refill takes 2 to 3 seconds. He is not diaphoretic.   Psychiatric: He has a normal mood and affect. His behavior is normal. Judgment and thought content normal.   Nursing note and vitals reviewed.          Lab Review  Results for orders placed or performed in visit on 07/02/19   T4 & TSH ([facility])   Result Value Ref Range    TSH 0.796 0.270 - 4.200 mIU/mL    T4, Total 6.30 4.50 - 11.70 mcg/dL   TestT+TestF+SHBG   Result Value Ref Range    Testosterone, Total 295 264 - 916 ng/dL    Testosterone, Free 9.1 7.2 - 24.0 pg/mL    Sex Hormone Binding Globulin 45.8 19.3 - 76.4 nmol/L   Uric Acid   Result Value Ref Range    Uric Acid 4.1 3.4 - 7.0 mg/dL   Vitamin D 25 Hydroxy   Result Value Ref Range    25 Hydroxy, Vitamin D 29.9 (L) 30.0 - 100.0 ng/ml   Comprehensive Metabolic Panel   Result Value Ref Range    Glucose 145 (H) 65 - 99 mg/dL    BUN 18 6 - 20 mg/dL    Creatinine 0.95 0.76 - 1.27 mg/dL    eGFR Non African Am 82 >60 mL/min/1.73    eGFR African Am 99 >60 mL/min/1.73    BUN/Creatinine Ratio 18.9 7.0 - 25.0    Sodium 142 136 - 145 mmol/L    Potassium 4.6 3.5 - 5.2 mmol/L    Chloride 102 98 - 107 mmol/L    Total CO2 28.6 22.0 - 29.0 mmol/L    Calcium 9.3 8.6 - 10.5 mg/dL    Total Protein 6.6  6.0 - 8.5 g/dL    Albumin 4.40 3.50 - 5.20 g/dL    Globulin 2.2 gm/dL    A/G Ratio 2.0 g/dL    Total Bilirubin 1.1 0.2 - 1.2 mg/dL    Alkaline Phosphatase 84 39 - 117 U/L    AST (SGOT) 10 1 - 40 U/L    ALT (SGPT) 11 1 - 41 U/L   C-Peptide   Result Value Ref Range    C-Peptide 3.6 1.1 - 4.4 ng/mL   Hemoglobin A1c   Result Value Ref Range    Hemoglobin A1C 6.50 (H) 4.80 - 5.60 %   Lipid Panel   Result Value Ref Range    Total Cholesterol 131 0 - 200 mg/dL    Triglycerides 143 0 - 150 mg/dL    HDL Cholesterol 42 40 - 60 mg/dL    VLDL Cholesterol 28.6 mg/dL    LDL Cholesterol  60 0 - 100 mg/dL   Hemoglobin & Hematocrit, Blood   Result Value Ref Range    Hemoglobin 14.8 13.0 - 17.7 g/dL    Hematocrit 48.5 37.5 - 51.0 %   Cardiovascular Risk Assessment   Result Value Ref Range    Interpretation Note    Diabetes Patient Education   Result Value Ref Range    PDF Image Not applicable            Assessment:   Azael was seen today for follow-up.    Diagnoses and all orders for this visit:    Uncontrolled type 2 diabetes mellitus with hyperglycemia (CMS/[facility])  -     TRULICITY 1.5 MG/0.5ML solution pen-injector; Inject 1.5 mg under the skin into the appropriate area as directed 1 (One) Time Per Week.  -     TRESIBA FLEXTOUCH 200 UNIT/ML solution pen-injector; Inject 100 Units under the skin into the appropriate area as directed Daily With Breakfast.  -     SEGLUROMET 7.5-1000 MG tablet; Take 2 tablets by mouth Daily With Breakfast.  -     Comprehensive Metabolic Panel; Future  -     C-Peptide; Future  -     Hemoglobin A1c; Future  -     Insulin, Total; Future  -     Lipid Panel; Future  -     Microalbumin / Creatinine Urine Ratio - Urine, Clean Catch; Future  -     Uric Acid; Future  -     Vitamin D 25 Hydroxy; Future  -     TSH; Future  -     T4, Free; Future  -     Thyroid Antibodies; Future  -     T3, Free; Future  -     Testosterone, Free, Total; Future  -     Testosterone; Future  -     Luteinizing Hormone; Future  -      Follicle Stimulating Hormone; Future  -     Sex Horm Binding Globulin; Future    Hypogonadism in male  -     tadalafil (CIALIS) 5 MG tablet; Take 1 tablet by mouth Daily As Needed for erectile dysfunction.  -     Comprehensive Metabolic Panel; Future  -     Testosterone, Free, Total; Future  -     Testosterone; Future  -     Luteinizing Hormone; Future  -     Follicle Stimulating Hormone; Future  -     Sex Horm Binding Globulin; Future    ED (erectile dysfunction) of organic origin  -     tadalafil (CIALIS) 5 MG tablet; Take 1 tablet by mouth Daily As Needed for erectile dysfunction.  -     Comprehensive Metabolic Panel; Future  -     Testosterone, Free, Total; Future  -     Testosterone; Future  -     Luteinizing Hormone; Future  -     Follicle Stimulating Hormone; Future  -     Sex Horm Binding Globulin; Future    Vitamin D deficiency  -     Cholecalciferol (VITAMIN D3) 66951 units capsule; Take 1 capsule by mouth 2 (Two) Times a Week.  -     Comprehensive Metabolic Panel; Future  -     Vitamin D 25 Hydroxy; Future          Plan:   In summary/ Medication changes: I met with this patient is metabolically stable and doing well at this time.  Laboratory testing was reviewed dated 7.2.2019, discussed with questions and answers completed.  Discussed and formulate a treatment plan with patient, patient verbally stated understood all instructions.    1. Diagnoses and all orders for this visit:    Uncontrolled type 2 diabetes mellitus with hyperglycemia (CMS/HCC)- chronic, stable no medication changes at this time. Refills prescribed. Future labs ordered for upcoming appointment and assessment.     -     TRULICITY 1.5 MG/0.5ML solution pen-injector; Inject 1.5 mg under the skin into the appropriate area as directed 1 (One) Time Per Week.  -     TRESIBA FLEXTOUCH 200 UNIT/ML solution pen-injector; Inject 100 Units under the skin into the appropriate area as directed Daily With Breakfast.  -     SEGLUROMET 7.5-1000 MG  tablet; Take 2 tablets by mouth Daily With Breakfast.     Hypogonadism in male/ ED (erectile dysfunction) of organic origin- chronic, uncontrolled.  Medication changes were as follows  Will add cialis. Drug to drug advised with alpha blocker flomax with s/e profile of hypotension- also to take within 4 hours of each other. Advised to take flomax in AM and the cialis in the PM. Patient verbally stated understood all instructions.         Vitamin D deficiency- chronic, uncontrolled.  Medication changes were as follows  Increase vitamin D 50,000 units - take 1 by mouth two time weekly.      -     Cholecalciferol (VITAMIN D3) 23333 units capsule; Take 1 capsule by mouth 2 (Two) Times a Week.        Instructions:  home blood tests -  Blood glucose testin-2 times daily, that are:  fasting- 1st thing in morning before eating or drinking  before each meal and 1 or 2 hours after meal  bedtime  anytime you feel symptoms of hyperglycemia or hypoglycemia (high or low blood sugars)        Education:  interpretation of lab results, blood sugar goals, complications of diabetes mellitus, hypoglycemia prevention and treatment, exercise, illness management, self-monitoring of blood glucose skills, nutrition and carbohydrate counting        The total face to face time spent was  25 minutes  with additional education given: 14 minutes (greater than 50% of the total time) was spent with counseling and coordination of care on: SMBG with goals, Side effects profiles with medications, medication use and purposes,     Return in about 3 months (around 10/16/2019), or if symptoms worsen or fail to improve, for Recheck. 3 months with Dariela-2 weeks prior for labs 6 months with Dr. Hardy-2 weeks prior for labs    Dragon transcription disclaimer     Much of this encounter note is an electronic transcription/translation of spoken language to printed text. The electronic translation of spoken language may permit erroneous, or at times,  nonsensical words or phrases to be inadvertently transcribed. Although I have reviewed the note for such errors, some may still exist.

## 2019-07-18 RX ORDER — FLUTICASONE PROPIONATE 50 UG/1
SPRAY, METERED NASAL
Qty: 15.8 ML | Refills: 0 | Status: SHIPPED | OUTPATIENT
Start: 2019-07-18 | End: 2019-07-29 | Stop reason: SDUPTHER

## 2019-07-21 ENCOUNTER — RESULTS ENCOUNTER (OUTPATIENT)
Dept: ENDOCRINOLOGY | Age: 54
End: 2019-07-21

## 2019-07-21 DIAGNOSIS — E55.9 VITAMIN D DEFICIENCY: ICD-10-CM

## 2019-07-21 DIAGNOSIS — E11.65 UNCONTROLLED TYPE 2 DIABETES MELLITUS WITH HYPERGLYCEMIA (HCC): ICD-10-CM

## 2019-07-21 DIAGNOSIS — E78.2 MIXED HYPERLIPIDEMIA: ICD-10-CM

## 2019-07-21 DIAGNOSIS — E29.1 HYPOGONADISM IN MALE: ICD-10-CM

## 2019-07-29 ENCOUNTER — OFFICE VISIT (OUTPATIENT)
Dept: FAMILY MEDICINE CLINIC | Facility: CLINIC | Age: 54
End: 2019-07-29

## 2019-07-29 VITALS
TEMPERATURE: 98 F | SYSTOLIC BLOOD PRESSURE: 122 MMHG | HEIGHT: 70 IN | DIASTOLIC BLOOD PRESSURE: 64 MMHG | OXYGEN SATURATION: 98 % | BODY MASS INDEX: 38.17 KG/M2 | WEIGHT: 266.6 LBS | HEART RATE: 60 BPM

## 2019-07-29 DIAGNOSIS — E78.2 MIXED HYPERLIPIDEMIA: Primary | ICD-10-CM

## 2019-07-29 DIAGNOSIS — I25.10 CORONARY ARTERY DISEASE INVOLVING NATIVE CORONARY ARTERY OF NATIVE HEART WITHOUT ANGINA PECTORIS: ICD-10-CM

## 2019-07-29 DIAGNOSIS — L40.9 PSORIASIS: ICD-10-CM

## 2019-07-29 DIAGNOSIS — R53.82 CHRONIC FATIGUE: ICD-10-CM

## 2019-07-29 DIAGNOSIS — E53.8 B12 DEFICIENCY: ICD-10-CM

## 2019-07-29 DIAGNOSIS — J44.9 COPD, MILD (HCC): ICD-10-CM

## 2019-07-29 DIAGNOSIS — K80.20 CALCULUS OF GALLBLADDER WITHOUT CHOLECYSTITIS WITHOUT OBSTRUCTION: ICD-10-CM

## 2019-07-29 DIAGNOSIS — E11.65 UNCONTROLLED TYPE 2 DIABETES MELLITUS WITH HYPERGLYCEMIA (HCC): ICD-10-CM

## 2019-07-29 DIAGNOSIS — R93.2 ABNORMAL LIVER DIAGNOSTIC IMAGING: ICD-10-CM

## 2019-07-29 DIAGNOSIS — E55.9 VITAMIN D DEFICIENCY: ICD-10-CM

## 2019-07-29 PROCEDURE — 99214 OFFICE O/P EST MOD 30 MIN: CPT | Performed by: PHYSICIAN ASSISTANT

## 2019-07-29 RX ORDER — FOLIC ACID 1 MG/1
1 TABLET ORAL DAILY
Qty: 30 TABLET | Refills: 3 | Status: SHIPPED | OUTPATIENT
Start: 2019-07-29 | End: 2021-02-01

## 2019-07-29 RX ORDER — ARIPIPRAZOLE 5 MG/1
5 TABLET ORAL DAILY
Refills: 0 | COMMUNITY
Start: 2019-07-17 | End: 2021-02-01

## 2019-07-29 RX ORDER — LANOLIN ALCOHOL/MO/W.PET/CERES
1000 CREAM (GRAM) TOPICAL DAILY
Qty: 30 TABLET | Refills: 4 | Status: SHIPPED | OUTPATIENT
Start: 2019-07-29 | End: 2021-01-06 | Stop reason: SDUPTHER

## 2019-07-29 NOTE — PROGRESS NOTES
Subjective   Azael Carmen is a 54 y.o. male here today for medication management for hypertension     History of Present Illness     Patient's specialists:  Dermatology- [facility]-last appt 7/15/19 for psoriasis.  He was started on Methotrexate while incarcerated.  He cautioned methotrexate with liver disease and diabetes.  They also recommend starting folic acid 1 mg daily.  He was given clobetasol as well. Follow up 8/2019  Cardiology- Dr Rucker- last appt 2/21/19 for CAD s/p PCI, HTN- stopped Ticagrelor due to being 2 years out from stent. Follow up in 6 months.  Endocrinology- Dr Hardy/Dariela Funez, KATERINA- last appt 7/2019 for DM, hyperlipidemia, hypogonadism, and Vitamin D deficiency-  follow up in 3 months.   Orthopedic surgery- Dr Flores- last appt 5/29/2019 for follow-up of carpal tunnel release. Appt with Dr Leyva, neurology, 4/15/19  Pulmonology- Dr Mata-last appointment-4/2019 for IRINA, asthma/ COPD- was seen initially and ordered CT chest 4/2/19 with new patchy infiltrate and gallstone.  Follow-up  Psychiatry- [facility]- on Zoloft, Abilify, and Atarax for anxiety.  Dentist- had follow up, up to date with appt  He was seeing Pain management but could not afford to go to [place] regularly. Still having bilateral hand, right shoulder and left pain as well as neck and back pain.   Liver US- irregular margins- possible cirrhosis and gallstones without cholecystitis    He denies any new complaints.  He did have left carpal tunnel release and still has some numbness and tingling from this but has improved from baseline.  He has applied for permanent disability and has a  through his insurance for this.  He needs follow-up with all specialist as directed.  He reports moods are pretty stable with communicator medication.    The following portions of the patient's history were reviewed and updated as appropriate: allergies, current medications, past family history, past  medical history, past social history, past surgical history and problem list.    Review of Systems   All other systems reviewed and are negative.      Objective   Physical Exam   Constitutional: He is oriented to person, place, and time. He appears well-developed.   HENT:   Head: Normocephalic and atraumatic.   Right Ear: External ear normal.   Left Ear: External ear normal.   Eyes: Conjunctivae are normal.   Neck: Carotid bruit is not present. No tracheal deviation present. No thyroid mass and no thyromegaly present.   Cardiovascular: Normal rate, regular rhythm and intact distal pulses.   Murmur heard.  Pulmonary/Chest: Effort normal. He has no wheezes. He has rhonchi ( Clear with coughing). He has no rales.   Neurological: He is alert and oriented to person, place, and time. Gait normal.   Skin: Skin is warm and dry.   Psychiatric: He has a normal mood and affect. His behavior is normal. Judgment and thought content normal.   Nursing note and vitals reviewed.      Assessment/Plan   Azael was seen today for med management.    Diagnoses and all orders for this visit:    Mixed hyperlipidemia    Coronary artery disease involving native coronary artery of native heart without angina pectoris    COPD, mild (CMS/HCC)    Vitamin D deficiency    Uncontrolled type 2 diabetes mellitus with hyperglycemia (CMS/HCC)    Chronic fatigue    Abnormal liver diagnostic imaging  -     Ambulatory Referral to Gastroenterology    Calculus of gallbladder without cholecystitis without obstruction  -     Ambulatory Referral to Gastroenterology    Psoriasis  -     Ambulatory Referral to Gastroenterology  -     folic acid (FOLVITE) 1 MG tablet; Take 1 tablet by mouth Daily.    B12 deficiency  -     vitamin B-12 (CYANOCOBALAMIN) 1000 MCG tablet; Take 1 tablet by mouth Daily.      Patient Instructions   54-year-old male who presents today in follow-up of hypertension, B12 and vitamin D deficiency, and all specialists.  He was referred to  endocrinology, cardiology, pulmonology, dermatology, and orthopedic surgery to reestablish care.  He is also continuing care with psychiatry.  Patient has been seen by all specialists and has been compliant with care.  Appointments as listed below. Patient continues with shortness of breath and dyspnea on exertion.  He feels this is more due to his lungs than his coronary artery disease.    Wheezing has resolved since last visit, however, he has coarse breath sounds versus rhonchi today that completely clear with coughing. He was again noted to have gallstones on CT, however, he does not want to see a surgeon at this time for cholecystectomy.  Patient also had a liver ultrasound when he went to the emergency department that showed possible irregular borders consistent with cirrhosis and cholelithiasis.  I discussed this with him again today.    He does not want to proceed with cholecystectomy unless this is necessary but would like to have his liver evaluated, as this was also a concern of the dermatologists with him being on methotrexate. Patient also continues with significant fatigue.  He is compliant with CPAP and last testosterone recheck was normal.   Patient to follow-up with me in 4 months for hypertension, B12 and folate deficiency, and specialist.  Continue follow-up with all specialist as directed by them.    Patient's specialists:  Dermatology- [facility]-last appt 7/15/19 for psoriasis.  He was started on Methotrexate while incarcerated.  He cautioned methotrexate with liver disease and diabetes.  They also recommend starting folic acid 1 mg daily.  He was given clobetasol as well. Follow up 8/2019  Cardiology- Dr Rucker- last appt 2/21/19 for CAD s/p PCI, HTN- stopped Ticagrelor due to being 2 years out from stent. Follow up in 6 months.  Endocrinology- Dr Hardy/KATERINA Serrano- last appt 7/2019 for DM, hyperlipidemia, hypogonadism, and Vitamin D deficiency-  follow up in 3  months.   Orthopedic surgery- Dr Flores- last appt 5/29/2019 for follow-up of carpal tunnel release. Appt with Dr Leyva, neurology, 4/15/19  Pulmonology- Dr Mata-last appointment-4/2019 for IRINA, asthma/ COPD- was seen initially and ordered CT chest 4/2/19 with new patchy infiltrate and gallstone.  Follow-up  Psychiatry- [facility]- on Zoloft, Abilify, and Atarax for anxiety.  Dentist- had follow up, up to date with appt  He was seeing Pain management but could not afford to go to [place] regularly. Still having bilateral hand, right shoulder and left pain as well as neck and back pain.   Liver US- irregular margins- possible cirrhosis and gallstones without cholecystitis

## 2019-07-29 NOTE — PATIENT INSTRUCTIONS
54-year-old male who presents today in follow-up of hypertension, B12 and vitamin D deficiency, and all specialists.  He was referred to endocrinology, cardiology, pulmonology, dermatology, and orthopedic surgery to reestablish care.  He is also continuing care with psychiatry.  Patient has been seen by all specialists and has been compliant with care.  Appointments as listed below. Patient continues with shortness of breath and dyspnea on exertion.  He feels this is more due to his lungs than his coronary artery disease.    Wheezing has resolved since last visit, however, he has coarse breath sounds versus rhonchi today that completely clear with coughing. He was again noted to have gallstones on CT, however, he does not want to see a surgeon at this time for cholecystectomy.  Patient also had a liver ultrasound when he went to the emergency department that showed possible irregular borders consistent with cirrhosis and cholelithiasis.  I discussed this with him again today.    He does not want to proceed with cholecystectomy unless this is necessary but would like to have his liver evaluated, as this was also a concern of the dermatologists with him being on methotrexate. Patient also continues with significant fatigue.  He is compliant with CPAP and last testosterone recheck was normal.   Patient to follow-up with me in 4 months for hypertension, B12 and folate deficiency, and specialist.  Continue follow-up with all specialist as directed by them.    Patient's specialists:  Dermatology- [facility]-last appt 7/15/19 for psoriasis.  He was started on Methotrexate while incarcerated.  He cautioned methotrexate with liver disease and diabetes.  They also recommend starting folic acid 1 mg daily.  He was given clobetasol as well. Follow up 8/2019  Cardiology- Dr Rucker- last appt 2/21/19 for CAD s/p PCI, HTN- stopped Ticagrelor due to being 2 years out from stent. Follow up in 6  months.  Endocrinology- Dr Hardy/KATERINA Serrano- last appt 7/2019 for DM, hyperlipidemia, hypogonadism, and Vitamin D deficiency-  follow up in 3 months.   Orthopedic surgery- Dr Flores- last appt 5/29/2019 for follow-up of carpal tunnel release. Appt with Dr Leyva, neurology, 4/15/19  Pulmonology- Dr Mata-last appointment-4/2019 for IRINA, asthma/ COPD- was seen initially and ordered CT chest 4/2/19 with new patchy infiltrate and gallstone.  Follow-up  Psychiatry- [facility]- on Zoloft, Abilify, and Atarax for anxiety.  Dentist- had follow up, up to date with appt  He was seeing Pain management but could not afford to go to [facility] regularly. Still having bilateral hand, right shoulder and left pain as well as neck and back pain.   Liver US- irregular margins- possible cirrhosis and gallstones without cholecystitis

## 2019-08-01 NOTE — TELEPHONE ENCOUNTER
CAROLINA Littlejohn from [facility] in [place], [place] called to inform you that they are taking over this patients care for psoriasis and prescription for methotrexate. He seen her on 7/15/2019 and is scheduled to see Dr. Harris in that office on 8/20/2019.

## 2019-10-16 ENCOUNTER — RESULTS ENCOUNTER (OUTPATIENT)
Dept: ENDOCRINOLOGY | Age: 54
End: 2019-10-16

## 2019-10-16 DIAGNOSIS — E11.65 UNCONTROLLED TYPE 2 DIABETES MELLITUS WITH HYPERGLYCEMIA (HCC): ICD-10-CM

## 2019-10-16 DIAGNOSIS — N52.9 ED (ERECTILE DYSFUNCTION) OF ORGANIC ORIGIN: ICD-10-CM

## 2019-10-16 DIAGNOSIS — E55.9 VITAMIN D DEFICIENCY: ICD-10-CM

## 2019-10-16 DIAGNOSIS — E29.1 HYPOGONADISM IN MALE: ICD-10-CM

## 2019-11-25 ENCOUNTER — OFFICE VISIT (OUTPATIENT)
Dept: FAMILY MEDICINE CLINIC | Facility: CLINIC | Age: 54
End: 2019-11-25

## 2019-11-25 VITALS
HEART RATE: 62 BPM | BODY MASS INDEX: 34.93 KG/M2 | HEIGHT: 70 IN | DIASTOLIC BLOOD PRESSURE: 64 MMHG | SYSTOLIC BLOOD PRESSURE: 120 MMHG | TEMPERATURE: 98 F | OXYGEN SATURATION: 98 % | WEIGHT: 244 LBS

## 2019-11-25 DIAGNOSIS — G47.33 OBSTRUCTIVE SLEEP APNEA: ICD-10-CM

## 2019-11-25 DIAGNOSIS — E29.1 HYPOGONADISM IN MALE: ICD-10-CM

## 2019-11-25 DIAGNOSIS — E53.8 B12 DEFICIENCY: ICD-10-CM

## 2019-11-25 DIAGNOSIS — L40.9 PSORIASIS: ICD-10-CM

## 2019-11-25 DIAGNOSIS — E11.65 UNCONTROLLED TYPE 2 DIABETES MELLITUS WITH HYPERGLYCEMIA (HCC): Primary | ICD-10-CM

## 2019-11-25 DIAGNOSIS — R74.8 ELEVATED CPK: ICD-10-CM

## 2019-11-25 DIAGNOSIS — E78.2 MIXED HYPERLIPIDEMIA: ICD-10-CM

## 2019-11-25 DIAGNOSIS — E53.8 FOLATE DEFICIENCY: ICD-10-CM

## 2019-11-25 DIAGNOSIS — J44.9 COPD, MILD (HCC): ICD-10-CM

## 2019-11-25 DIAGNOSIS — R93.2 ABNORMAL LIVER DIAGNOSTIC IMAGING: ICD-10-CM

## 2019-11-25 DIAGNOSIS — I25.10 CORONARY ARTERY DISEASE INVOLVING NATIVE CORONARY ARTERY OF NATIVE HEART WITHOUT ANGINA PECTORIS: ICD-10-CM

## 2019-11-25 DIAGNOSIS — K80.20 CALCULUS OF GALLBLADDER WITHOUT CHOLECYSTITIS WITHOUT OBSTRUCTION: ICD-10-CM

## 2019-11-25 DIAGNOSIS — R53.82 CHRONIC FATIGUE: ICD-10-CM

## 2019-11-25 DIAGNOSIS — E55.9 VITAMIN D DEFICIENCY: ICD-10-CM

## 2019-11-25 PROCEDURE — 99214 OFFICE O/P EST MOD 30 MIN: CPT | Performed by: PHYSICIAN ASSISTANT

## 2019-11-25 RX ORDER — CLOBETASOL PROPIONATE 0.5 MG/G
OINTMENT TOPICAL
COMMUNITY
Start: 2019-08-20 | End: 2020-12-22

## 2019-11-25 NOTE — PROGRESS NOTES
Subjective   Azael Carmen is a 54 y.o. male here today for follow up and medication management for diabetes, hyperlipidemia, and hypogonadism, hypertension, B12 and folate deficiencies, and specialists    History of Present Illness     HTN- stable. Doing well on Lasix 40 mg and Losartan 100 mg once daily.   B12/folate deficiency- taking as directed. Has tolerated without AE.     Behind his ear, has a cyst. Has brian there a while but now is starting to aggravate. If hits it, it is painful.   Cholelithiasis- still no symptoms, nausea, abd pain, vomiting.     Patient's specialists:  Dermatology- [facility]-last appt 9/2019 for psoriasis.  He was started on Methotrexate while incarcerated.  He cautioned methotrexate with liver disease and diabetes.  They also recommend starting folic acid 1 mg daily.  He was given clobetasol as well. Follow up - unsure  Cardiology- Dr Rucker- last appt 2/21/19 for CAD s/p PCI, HTN- stopped Ticagrelor due to being 2 years out from stent. Follow up in 6 months. Not scheduled  Endocrinology- Dr Hardy/Dariela Funez, KATERINA- last appt 7/2019 for DM, hyperlipidemia, hypogonadism, and Vitamin D deficiency-  follow up in 3 months. Patient no show and was scheduled for 3/2020 for follow up  Orthopedic surgery- Dr Flores- last appt 5/29/2019 for follow-up of carpal tunnel release. Appt with Dr Leyva, neurology, 4/15/19. No show final 4 week follow up  Pulmonology- Dr Mata-last appointment-7/2019 for IRINA, asthma/ COPD- was seen initially and ordered CT chest 4/2/19 with new patchy infiltrate and gallstone.  Follow-up  Psychiatry- [facility]- on Zoloft, Abilify, and Atarax for anxiety. Last appt- 2 months ago. Follow up next month- sees quarterly.   Dentist- had follow up, up to date with appt  He was seeing Pain management but could not afford to go to [facility] regularly. Still having bilateral hand, right shoulder and left pain as well as neck and back pain.   Liver US-  irregular margins- possible cirrhosis and gallstones without cholecystitis    The following portions of the patient's history were reviewed and updated as appropriate: allergies, current medications, past family history, past medical history, past social history, past surgical history and problem list.    Review of Systems   Constitutional: Positive for fatigue.   HENT: Negative.    Eyes: Negative.    Respiratory: Negative.    Cardiovascular: Positive for chest pain.   Endocrine: Negative.    Genitourinary: Negative.    Skin:        cyst   Allergic/Immunologic: Negative.    Neurological: Negative.        Objective    Vitals:    11/25/19 0749   BP: 120/64   Pulse: 62   Temp: 98 °F (36.7 °C)   SpO2: 98%     Body mass index is 35.01 kg/m².    Physical Exam   Constitutional: He is oriented to person, place, and time. He appears well-developed.   HENT:   Head: Normocephalic and atraumatic.       Right Ear: External ear normal.   Left Ear: External ear normal.   Eyes: Conjunctivae are normal.   Neck: Carotid bruit is not present. No tracheal deviation present. No thyroid mass and no thyromegaly present.   Cardiovascular: Normal rate, regular rhythm, normal heart sounds and intact distal pulses.   Pulmonary/Chest: Effort normal and breath sounds normal.   Neurological: He is alert and oriented to person, place, and time. Gait normal.   Skin: Skin is warm and dry.   Psychiatric: He has a normal mood and affect. His behavior is normal. Judgment and thought content normal.   Nursing note and vitals reviewed.      Assessment/Plan   Azael was seen today for follow-up.    Diagnoses and all orders for this visit:    Uncontrolled type 2 diabetes mellitus with hyperglycemia (CMS/[facility])  -     Ambulatory Referral to Cardiology    Mixed hyperlipidemia  -     Ambulatory Referral to Cardiology    Coronary artery disease involving native coronary artery of native heart without angina pectoris  -     Ambulatory Referral to  Cardiology    Hypogonadism in male  -     Ambulatory Referral to Cardiology    Abnormal liver diagnostic imaging    Calculus of gallbladder without cholecystitis without obstruction    Vitamin D deficiency    Elevated CPK  -     Comprehensive Metabolic Panel  -     CK    Chronic fatigue    B12 deficiency  -     CBC & Differential  -     Vitamin B12 & Folate    Folate deficiency  -     CBC & Differential  -     Vitamin B12 & Folate    Psoriasis    Obstructive sleep apnea    COPD, mild (CMS/HCC)      Patient Instructions   54-year-old male who presents today in follow-up of 54-year-old male who presents today in follow-up of hypertension, B12 and vitamin D deficiency, and all specialists.  He was referred to endocrinology, cardiology, pulmonology, dermatology, and orthopedic surgery to reestablish care.  He is also continuing care with psychiatry.  Patient has been seen by all specialists and has been compliant with care.  Appointments as listed below.  Patient also had a liver ultrasound when he went to the emergency department that showed possible irregular borders consistent with cirrhosis and cholelithiasis. He does not want to proceed with cholecystectomy unless this is necessary but was willing to have his liver evaluated, as this was also a concern of the dermatologists with him being on methotrexate. Patient also continues with significant fatigue.  He is compliant with CPAP and last testosterone recheck was normal.   Patient to follow-up with me in 4 months for hypertension, B12 and folate deficiency, and specialist.  Continue follow-up with all specialist as directed by them. He was referred to endocrinology, cardiology, pulmonology, dermatology, and orthopedic surgery to reestablish care.  He is also continuing care with psychiatry.  Patient has been seen by all specialists and has been compliant with care.  Appointments as listed below. Patient continues with shortness of breath and dyspnea on exertion.   He feels this is more due to his lungs than his coronary artery disease.    Wheezing persists today. Patient also had a liver ultrasound when he went to the emergency department that showed possible irregular borders consistent with cirrhosis and cholelithiasis.  I discussed this with him again today.    He does not want to proceed with cholecystectomy unless this is necessary but would like to have his liver evaluated, as this was also a concern of the dermatologists with him being on methotrexate. Patient also continues with significant fatigue.  He is compliant with CPAP and last testosterone recheck was normal.   Patient to follow-up with me in 4 months for hypertension, B12 and folate deficiency, and specialist.  Continue follow-up with all specialist as directed by them.    Patient has a cyst behind his left ear and would like to see about getting this removed due to enlarging and being bothersome. He will discuss this with dermatology. If they will not remove it, I will consider referral to ENT or surgery.     He needs a referral back to cardiology- I will refer him today.

## 2019-11-26 LAB
ALBUMIN SERPL-MCNC: 4.4 G/DL (ref 3.5–5.2)
ALBUMIN/GLOB SERPL: 1.5 G/DL
ALP SERPL-CCNC: 87 U/L (ref 39–117)
ALT SERPL-CCNC: 12 U/L (ref 1–41)
AST SERPL-CCNC: 13 U/L (ref 1–40)
BASOPHILS # BLD AUTO: 0.08 10*3/MM3 (ref 0–0.2)
BASOPHILS NFR BLD AUTO: 1 % (ref 0–1.5)
BILIRUB SERPL-MCNC: 0.7 MG/DL (ref 0.2–1.2)
BUN SERPL-MCNC: 15 MG/DL (ref 6–20)
BUN/CREAT SERPL: 16.3 (ref 7–25)
CALCIUM SERPL-MCNC: 9.6 MG/DL (ref 8.6–10.5)
CHLORIDE SERPL-SCNC: 95 MMOL/L (ref 98–107)
CK SERPL-CCNC: 52 U/L (ref 20–200)
CO2 SERPL-SCNC: 25.1 MMOL/L (ref 22–29)
CREAT SERPL-MCNC: 0.92 MG/DL (ref 0.76–1.27)
EOSINOPHIL # BLD AUTO: 0.27 10*3/MM3 (ref 0–0.4)
EOSINOPHIL NFR BLD AUTO: 3.4 % (ref 0.3–6.2)
ERYTHROCYTE [DISTWIDTH] IN BLOOD BY AUTOMATED COUNT: 14.7 % (ref 12.3–15.4)
FOLATE SERPL-MCNC: 7.53 NG/ML (ref 4.78–24.2)
GLOBULIN SER CALC-MCNC: 2.9 GM/DL
GLUCOSE SERPL-MCNC: 353 MG/DL (ref 65–99)
HCT VFR BLD AUTO: 53.1 % (ref 37.5–51)
HGB BLD-MCNC: 17.3 G/DL (ref 13–17.7)
IMM GRANULOCYTES # BLD AUTO: 0.02 10*3/MM3 (ref 0–0.05)
IMM GRANULOCYTES NFR BLD AUTO: 0.2 % (ref 0–0.5)
LYMPHOCYTES # BLD AUTO: 2.63 10*3/MM3 (ref 0.7–3.1)
LYMPHOCYTES NFR BLD AUTO: 32.8 % (ref 19.6–45.3)
MCH RBC QN AUTO: 27 PG (ref 26.6–33)
MCHC RBC AUTO-ENTMCNC: 32.6 G/DL (ref 31.5–35.7)
MCV RBC AUTO: 83 FL (ref 79–97)
MONOCYTES # BLD AUTO: 0.6 10*3/MM3 (ref 0.1–0.9)
MONOCYTES NFR BLD AUTO: 7.5 % (ref 5–12)
NEUTROPHILS # BLD AUTO: 4.41 10*3/MM3 (ref 1.7–7)
NEUTROPHILS NFR BLD AUTO: 55.1 % (ref 42.7–76)
NRBC BLD AUTO-RTO: 0 /100 WBC (ref 0–0.2)
PLATELET # BLD AUTO: 378 10*3/MM3 (ref 140–450)
POTASSIUM SERPL-SCNC: 4.8 MMOL/L (ref 3.5–5.2)
PROT SERPL-MCNC: 7.3 G/DL (ref 6–8.5)
RBC # BLD AUTO: 6.4 10*6/MM3 (ref 4.14–5.8)
SODIUM SERPL-SCNC: 135 MMOL/L (ref 136–145)
VIT B12 SERPL-MCNC: 618 PG/ML (ref 211–946)
WBC # BLD AUTO: 8.01 10*3/MM3 (ref 3.4–10.8)

## 2020-12-03 ENCOUNTER — TELEPHONE (OUTPATIENT)
Dept: FAMILY MEDICINE CLINIC | Facility: CLINIC | Age: 55
End: 2020-12-03

## 2020-12-03 NOTE — TELEPHONE ENCOUNTER
PATIENT IS NEEDING A CALL BACK TO GO OVER AND REFILL DIABETIC MEDICINE AND ALSO MIGHT NEED A NEW SYSTEM FOR CHECKING BLOOD.  PLEASE ADVISE.  [phone] AT PARENTS, SO ASK FOR YISEL.

## 2020-12-21 DIAGNOSIS — R73.9 HYPERGLYCEMIA: ICD-10-CM

## 2020-12-21 DIAGNOSIS — E11.65 UNCONTROLLED TYPE 2 DIABETES MELLITUS WITH HYPERGLYCEMIA (HCC): ICD-10-CM

## 2020-12-21 DIAGNOSIS — IMO0002 UNCONTROLLED TYPE 2 DIABETES MELLITUS WITH COMPLICATION, WITHOUT LONG-TERM CURRENT USE OF INSULIN: ICD-10-CM

## 2020-12-21 NOTE — TELEPHONE ENCOUNTER
PHARMACY WAS CALLING IN A REFILL FOR THE PATIENTS TEST STRIPS AND LANCETS.  SHE STATES THAT PATIENT WAS RECENTLY RELEASED FROM INCARCERATION AND IS LOOKING TO HAVE SOME MEDICATION CALLED IN UNTIL HE CAN GET AN APPOINTMENT    PLEASE ADVISE    PATIENT CAN BE REACHED AT  [phone] (ASK FOR YISEL)(PATIENT AND FATHER HAVE SAME NAME)    [facility] CONFIRMED PHARMACY  [facility], [place] - [address] - [phone]  - [phone] FX  [phone]

## 2020-12-22 ENCOUNTER — OFFICE VISIT (OUTPATIENT)
Dept: FAMILY MEDICINE CLINIC | Facility: CLINIC | Age: 55
End: 2020-12-22

## 2020-12-22 VITALS
HEART RATE: 69 BPM | BODY MASS INDEX: 33.82 KG/M2 | TEMPERATURE: 98.2 F | WEIGHT: 236.2 LBS | DIASTOLIC BLOOD PRESSURE: 68 MMHG | SYSTOLIC BLOOD PRESSURE: 140 MMHG | OXYGEN SATURATION: 99 % | HEIGHT: 70 IN | RESPIRATION RATE: 17 BRPM

## 2020-12-22 DIAGNOSIS — Z23 NEED FOR IMMUNIZATION AGAINST INFLUENZA: ICD-10-CM

## 2020-12-22 DIAGNOSIS — R74.8 ELEVATED CPK: ICD-10-CM

## 2020-12-22 DIAGNOSIS — R39.11 URINARY HESITANCY: ICD-10-CM

## 2020-12-22 DIAGNOSIS — M50.120 CERVICAL DISC DISORDER WITH RADICULOPATHY OF MID-CERVICAL REGION: ICD-10-CM

## 2020-12-22 DIAGNOSIS — E11.65 UNCONTROLLED TYPE 2 DIABETES MELLITUS WITH HYPERGLYCEMIA (HCC): Primary | ICD-10-CM

## 2020-12-22 DIAGNOSIS — Z12.5 PROSTATE CANCER SCREENING: ICD-10-CM

## 2020-12-22 DIAGNOSIS — E53.8 B12 DEFICIENCY: ICD-10-CM

## 2020-12-22 DIAGNOSIS — E78.2 MIXED HYPERLIPIDEMIA: ICD-10-CM

## 2020-12-22 DIAGNOSIS — E29.1 HYPOGONADISM IN MALE: ICD-10-CM

## 2020-12-22 DIAGNOSIS — R53.82 CHRONIC FATIGUE: ICD-10-CM

## 2020-12-22 DIAGNOSIS — L40.9 PSORIASIS: ICD-10-CM

## 2020-12-22 DIAGNOSIS — R93.2 ABNORMAL LIVER DIAGNOSTIC IMAGING: ICD-10-CM

## 2020-12-22 DIAGNOSIS — F41.8 DEPRESSION WITH ANXIETY: ICD-10-CM

## 2020-12-22 DIAGNOSIS — J44.9 COPD, MILD (HCC): ICD-10-CM

## 2020-12-22 DIAGNOSIS — E55.9 VITAMIN D DEFICIENCY: ICD-10-CM

## 2020-12-22 DIAGNOSIS — G47.33 OBSTRUCTIVE SLEEP APNEA: ICD-10-CM

## 2020-12-22 DIAGNOSIS — I25.10 CORONARY ARTERY DISEASE INVOLVING NATIVE CORONARY ARTERY OF NATIVE HEART WITHOUT ANGINA PECTORIS: ICD-10-CM

## 2020-12-22 DIAGNOSIS — M51.36 BULGING OF LUMBAR INTERVERTEBRAL DISC: ICD-10-CM

## 2020-12-22 DIAGNOSIS — N50.812 LEFT TESTICULAR PAIN: ICD-10-CM

## 2020-12-22 DIAGNOSIS — K80.20 CALCULUS OF GALLBLADDER WITHOUT CHOLECYSTITIS WITHOUT OBSTRUCTION: ICD-10-CM

## 2020-12-22 DIAGNOSIS — E53.8 FOLATE DEFICIENCY: ICD-10-CM

## 2020-12-22 DIAGNOSIS — M19.90 ARTHRITIS: ICD-10-CM

## 2020-12-22 LAB
BILIRUB BLD-MCNC: NEGATIVE MG/DL
CLARITY, POC: CLEAR
COLOR UR: YELLOW
GLUCOSE UR STRIP-MCNC: ABNORMAL MG/DL
KETONES UR QL: NEGATIVE
LEUKOCYTE EST, POC: NEGATIVE
NITRITE UR-MCNC: NEGATIVE MG/ML
PH UR: 7.5 [PH] (ref 5–8)
POC CREATININE URINE: 100
POC MICROALBUMIN URINE: 10
PROT UR STRIP-MCNC: NEGATIVE MG/DL
RBC # UR STRIP: NEGATIVE /UL
SP GR UR: 1.02 (ref 1–1.03)
UROBILINOGEN UR QL: NORMAL

## 2020-12-22 PROCEDURE — 90686 IIV4 VACC NO PRSV 0.5 ML IM: CPT | Performed by: PHYSICIAN ASSISTANT

## 2020-12-22 PROCEDURE — 99214 OFFICE O/P EST MOD 30 MIN: CPT | Performed by: PHYSICIAN ASSISTANT

## 2020-12-22 PROCEDURE — 90471 IMMUNIZATION ADMIN: CPT | Performed by: PHYSICIAN ASSISTANT

## 2020-12-22 PROCEDURE — 82044 UR ALBUMIN SEMIQUANTITATIVE: CPT | Performed by: PHYSICIAN ASSISTANT

## 2020-12-22 RX ORDER — AMMONIUM LACTATE 120 MG/G
CREAM TOPICAL AS NEEDED
COMMUNITY
End: 2022-12-19

## 2020-12-22 NOTE — PROGRESS NOTES
Subjective   Azael Carmen is a 55 y.o. male here today for follow up and medication management for diabetes, hyperlipidemia, and hypogonadism, hypertension, B12 and folate deficiencies, and specialists    History of Present Illness     3 1/2 old warrant that they arrested him for.     HTN- stable. Doing well on Lasix 40 mg, potassium 10 mEq daily, and Losartan 100 mg once daily.   B12/folate deficiency- was taking B12 1000 mcg and folic acid 1 mg once daily as directed. Has tolerated without AE.     Cholelithiasis- still no symptoms, nausea, abd pain, vomiting.   Abnormal liver- possible cirrhotic appearance of liver.     Endocrinology- previously Dr Hardy and Dariela BORGES- monitoring and treating. Last appt   Diabetes mellitus type 2- Patient was incarcerated and they started on Levemir and Aspart- only taking Levemir 20 units in the morning and Aspart x 3 in 3 months. Was controlling glucose. This morning took Aspart 4 units and Levemir 20 units this morning. Glucose this am 260- has been cheating more- has not been as good.   · Previously on Segluromet 7.5/1000 mg 2 tablets once daily, Tulicity 1.5 mg, and Tresiba.   · Went to get eyes checked.  Hyperlipidemia- with CAD and history of elevated CK. Previously on Lipitor 40 mg then 20 mg at bedtime and tolerated without AE.   Hypogonadism- improved with CPAP therapy and treatment of underlying diseases. Was on Cialis and Flomax per endocrinology note.   Vitamin D deficiency- was on vitamin D 50,000 IU daily.     IRINA- Using CPAP nightly. Still needing additional equipment but did not receive from durable medical equipment company.   Asthma/ COPD- was on Breo once daily and Xopenex as needed.     When goes with his father, travels a lot and has a hard time checking     BPH- when standing to use the bathroom- takes a while to go. Started riding a recumbent bike and has tailbone pain.   Left testicular pain and continued back pain. No swelling, masses, or  lesions.   Chronic back pain- pain in back and left testicle.     Mood disorder- was seeing psychiatry and was on Zoloft 100 mg once daily and Abilify 5 mg at bedtime.     Posterior auricular cyst- last visit, patient had a cyst behind his left ear and wanted to see about removal due to enlarging and being bothersome. He was advised to discuss with dermatology, and if they would not remove, I would consider referral to ENT or surgery.     Patient's specialists:  Dermatology- Dr Frank Harris- [facility]-last appt 2/2020 for plaque psoriasis.  Treated with Methotrexate, Folic acid, and Clobetasol as directed. Started while incarcerated and continued by dermatology.  He cautioned methotrexate with liver disease and diabetes. Follow up - unsure  Cardiology- Dr Rucker- last appt 2/21/19 for CAD s/p PCI, HTN- stopped Ticagrelor due to being 2 years out from stent. Follow up in 6 months. Not scheduled  Pulmonology- Dr Mata-last appointment-1/2020 for IRINA, asthma/ COPD. Continue Breo, Singulair, and CPAP. He was seen initially and ordered CT chest 4/2/19 with new patchy infiltrate and gallstone.  Follow-up unknown.   Endocrinology- Dr Hardy/KATERINA Serrano- last appt 7/2019 for DM, hyperlipidemia, hypogonadism, and Vitamin D deficiency-  follow up in 3 months. Patient no show 3/2020 appt for follow up  Orthopedic surgery- Dr Flores- last appt 5/29/2019 for follow-up of carpal tunnel release. Appt with Dr Leyva, neurology, 4/15/19. No show final 4 week follow up  Neurosurgery- Dr Carroll- last appt 9/2016 for cervical spine DDD with radiculopathy. Referred to pain management. If no resolution, could consider surgery if so severe he could not live with his pain.   Pain management- Dr Porter- DDD lumbar and cervical spine. Was on Norco 10 mg once  Psychiatry- [facility]- on Zoloft, Abilify, and Atarax for anxiety. Last appt- 2 months ago. Follow up next month- sees quarterly.   Dentist- had  follow up, up to date with appt  He was seeing Pain management but could not afford to go to [place] regularly. Still having bilateral hand, right shoulder and left pain as well as neck and back pain.   Liver US- irregular margins- possible cirrhosis and gallstones without cholecystitis    The following portions of the patient's history were reviewed and updated as appropriate: allergies, current medications, past family history, past medical history, past social history, past surgical history and problem list.    Review of Systems   Constitutional: Positive for fatigue.   HENT: Negative.    Eyes: Negative.    Respiratory: Negative.    Cardiovascular: Positive for chest pain.   Endocrine: Negative.    Genitourinary: Positive for difficulty urinating, dysuria and testicular pain.   Musculoskeletal: Positive for back pain.   Skin:        cyst   Allergic/Immunologic: Negative.    Neurological: Negative.    Psychiatric/Behavioral: Positive for decreased concentration. The patient is nervous/anxious.        Objective    Vitals:    12/22/20 1056   BP: 140/68   Pulse: 69   Resp: 17   Temp: 98.2 °F (36.8 °C)   SpO2: 99%     Body mass index is 33.89 kg/m².    Physical Exam   Constitutional: He is oriented to person, place, and time. He appears well-developed. No distress.   HENT:   Head: Normocephalic and atraumatic.   Right Ear: External ear normal.   Left Ear: External ear normal.   Eyes: Conjunctivae are normal.   Neck: Carotid bruit is not present. No tracheal deviation present. No thyroid mass and no thyromegaly present.   Cardiovascular: Normal rate, regular rhythm, normal heart sounds and normal pulses.   Pulmonary/Chest: Effort normal and breath sounds normal.   Abdominal: Normal appearance.   Neurological: He is alert and oriented to person, place, and time. Gait normal.   Skin: Skin is warm and dry.   Psychiatric: His behavior is normal. Mood, judgment and thought content normal.   Nursing note and vitals  reviewed.      Assessment/Plan   Diagnoses and all orders for this visit:    1. Uncontrolled type 2 diabetes mellitus with hyperglycemia (CMS/HCC) (Primary)  -     Comprehensive Metabolic Panel  -     Hemoglobin A1c  -     Uric Acid  -     POC Urinalysis Dipstick, Automated  -     POC Microalbumin  -     Ambulatory Referral to Endocrinology  -     Ambulatory Referral to Optometry    2. Mixed hyperlipidemia  -     Comprehensive Metabolic Panel  -     Hemoglobin A1c  -     CK  -     Lipid Panel With LDL / HDL Ratio  -     Uric Acid  -     Ambulatory Referral to Endocrinology    3. Elevated CPK  -     CK  -     Ambulatory Referral to Endocrinology    4. Vitamin D deficiency  -     Comprehensive Metabolic Panel  -     Vitamin D 25 Hydroxy  -     Ambulatory Referral to Endocrinology    5. B12 deficiency  -     CBC & Differential  -     Vitamin B12 & Folate    6. Folate deficiency  -     CBC & Differential  -     Vitamin B12 & Folate    7. Coronary artery disease involving native coronary artery of native heart without angina pectoris  -     Comprehensive Metabolic Panel  -     Hemoglobin A1c  -     CK  -     Lipid Panel With LDL / HDL Ratio  -     Luteinizing Hormone  -     Ambulatory Referral to Cardiology    8. Hypogonadism in male  -     Uric Acid  -     Testosterone, Free, Total  -     Follicle Stimulating Hormone  -     Ambulatory Referral to Endocrinology    9. Abnormal liver diagnostic imaging  -     Comprehensive Metabolic Panel  -     Uric Acid    10. Calculus of gallbladder without cholecystitis without obstruction    11. Obstructive sleep apnea  -     Ambulatory Referral to Pulmonology    12. COPD, mild (CMS/HCC)  -     Ambulatory Referral to Pulmonology    13. Depression with anxiety  -     Ambulatory Referral to Psychiatry    14. Chronic fatigue  -     CBC & Differential  -     Comprehensive Metabolic Panel  -     Iron and TIBC  -     Ferritin  -     Vitamin B12 & Folate  -     Vitamin D 25 Hydroxy  -      TSH  -     T4, free  -     T3, Free  -     Ambulatory Referral to Endocrinology    15. Psoriasis    16. Bulging of lumbar intervertebral disc    17. Cervical disc disorder with radiculopathy of mid-cervical region    18. Arthritis    19. Prostate cancer screening  -     PSA Screen    20. Left testicular pain  -     Ambulatory Referral to Urology    21. Urinary hesitancy  -     Ambulatory Referral to Urology    22. Need for immunization against influenza  -     FluLaval Quad >6 Months ([id number])      Assessment and plan  Patient had a lapse in healthcare due to prolonged incarceration. He re-established care and re-established with specialists. He was stable again then was incarcerated again for several months. He is still a current patient with specialists but needs follow up with them- I will refer back to specialists. Patient will have fasting labs. Call if no results in 1 week. Stability of conditions, plan, follow up, and further recommendations pending labs. He will need to see specialists in follow up and follow up with me in no more than 3 months.     · HTN- BP is elevated today. Continue Lasix 40 mg, potassium 10 mEq daily, and Losartan 100 mg once daily. Monitor BP 2-3 x daily and submit log in 1-2 weeks. To call or return if elevated or low BP.   · B12 deficiency- further recommendations pending labs.  · Folate deficiency- Await labs and take per dermatology recommendations.   · Cholelithiasis- Asymptomatic- he will need to see GI with abnormal liver and will need to consider cholecystectomy once he is up to date and stable with specialists. To be seen here or ER if he becomes symptomatic. I will refer to GI.   · Abnormal liver- He needs to see GI with possible cirrhotic appearance of liver. I will refer today.  · Diabetes mellitus type 2- Continue Levemir and Aspart as directed. I will refer back to endocrinology to determine regimen.   · Hyperlipidemia- Continue Lipitor 20 mg at bedtime. Await labs. I  will refer back to endocrinology and cardiology.    · Hypogonadism- I will refer back to endocrinology for evaluation and treatment recommendations. He has been limited on treatment with CAD.   · Vitamin D deficiency- Continue vitamin D 50,000 IU daily. Await labs for dosing recommendations. I will refer back to endocrinology for treatment.   · IRINA- Continue CPAP nightly. I will refer back to pulmonology.    · Asthma/ COPD- Continue Breo once daily and Xopenex as needed. I will refer back to pulmonology.   · BPH with obstructive symptoms- I will refer to urology for evaluation. Continue Flomax 0.4 mg once daily.   · Left testicular pain- I will refer to urology for evaluation. If normal/ negative evaluation, we will consider DDD lumbar with radiculopathy as etiology of symptoms.   · Chronic back pain- Consider further workup or referrals once he is up to date on specialists and treatment.   · Mood disorder- Continue Zoloft 100 mg once daily and Abilify 5 mg at bedtime. I will refer back to psychiatry for management and therapy.   · Posterior auricular cyst- Discuss with dermatology. If they will not remove, I would consider referral to ENT or surgery.     Patient was seen by specialists as noted above.  I have reviewed available records, including consult notes, labs, and imaging/testing.  I will refer back for follow-up and treatment with specialists. He will need to continue to follow up with them as directed.     We will want to have him follow up for CPE and to ensure health maintenance is up to date in the future.

## 2020-12-24 LAB
25(OH)D3+25(OH)D2 SERPL-MCNC: 20.5 NG/ML (ref 30–100)
ALBUMIN SERPL-MCNC: 4.3 G/DL (ref 3.5–5.2)
ALBUMIN/GLOB SERPL: 1.7 G/DL
ALP SERPL-CCNC: 66 U/L (ref 39–117)
ALT SERPL-CCNC: 15 U/L (ref 1–41)
AST SERPL-CCNC: 10 U/L (ref 1–40)
BASOPHILS # BLD AUTO: 0.06 10*3/MM3 (ref 0–0.2)
BASOPHILS NFR BLD AUTO: 0.9 % (ref 0–1.5)
BILIRUB SERPL-MCNC: 0.8 MG/DL (ref 0–1.2)
BUN SERPL-MCNC: 20 MG/DL (ref 6–20)
BUN/CREAT SERPL: 26 (ref 7–25)
CALCIUM SERPL-MCNC: 9.5 MG/DL (ref 8.6–10.5)
CHLORIDE SERPL-SCNC: 100 MMOL/L (ref 98–107)
CHOLEST SERPL-MCNC: 179 MG/DL (ref 0–200)
CK SERPL-CCNC: 67 U/L (ref 20–200)
CO2 SERPL-SCNC: 28.6 MMOL/L (ref 22–29)
CREAT SERPL-MCNC: 0.77 MG/DL (ref 0.76–1.27)
EOSINOPHIL # BLD AUTO: 0.12 10*3/MM3 (ref 0–0.4)
EOSINOPHIL NFR BLD AUTO: 1.7 % (ref 0.3–6.2)
ERYTHROCYTE [DISTWIDTH] IN BLOOD BY AUTOMATED COUNT: 12.5 % (ref 12.3–15.4)
FERRITIN SERPL-MCNC: 45 NG/ML (ref 30–400)
FOLATE SERPL-MCNC: >20 NG/ML (ref 4.78–24.2)
FSH SERPL-ACNC: 4.2 MIU/ML (ref 1.5–12.4)
GLOBULIN SER CALC-MCNC: 2.5 GM/DL
GLUCOSE SERPL-MCNC: 205 MG/DL (ref 65–99)
HBA1C MFR BLD: 8.6 % (ref 4.8–5.6)
HCT VFR BLD AUTO: 43 % (ref 37.5–51)
HDLC SERPL-MCNC: 67 MG/DL (ref 40–60)
HGB BLD-MCNC: 14.6 G/DL (ref 13–17.7)
IMM GRANULOCYTES # BLD AUTO: 0.01 10*3/MM3 (ref 0–0.05)
IMM GRANULOCYTES NFR BLD AUTO: 0.1 % (ref 0–0.5)
IRON SATN MFR SERPL: 23 % (ref 20–50)
IRON SERPL-MCNC: 100 MCG/DL (ref 59–158)
LDLC SERPL CALC-MCNC: 89 MG/DL (ref 0–100)
LDLC/HDLC SERPL: 1.27 {RATIO}
LH SERPL-ACNC: 6.4 MIU/ML (ref 1.7–8.6)
LYMPHOCYTES # BLD AUTO: 2.54 10*3/MM3 (ref 0.7–3.1)
LYMPHOCYTES NFR BLD AUTO: 36.3 % (ref 19.6–45.3)
MCH RBC QN AUTO: 30.3 PG (ref 26.6–33)
MCHC RBC AUTO-ENTMCNC: 34 G/DL (ref 31.5–35.7)
MCV RBC AUTO: 89.2 FL (ref 79–97)
MONOCYTES # BLD AUTO: 0.49 10*3/MM3 (ref 0.1–0.9)
MONOCYTES NFR BLD AUTO: 7 % (ref 5–12)
NEUTROPHILS # BLD AUTO: 3.77 10*3/MM3 (ref 1.7–7)
NEUTROPHILS NFR BLD AUTO: 54 % (ref 42.7–76)
NRBC BLD AUTO-RTO: 0.1 /100 WBC (ref 0–0.2)
PLATELET # BLD AUTO: 336 10*3/MM3 (ref 140–450)
POTASSIUM SERPL-SCNC: 4.6 MMOL/L (ref 3.5–5.2)
PROT SERPL-MCNC: 6.8 G/DL (ref 6–8.5)
PSA SERPL-MCNC: 1.09 NG/ML (ref 0–4)
RBC # BLD AUTO: 4.82 10*6/MM3 (ref 4.14–5.8)
SODIUM SERPL-SCNC: 136 MMOL/L (ref 136–145)
T3FREE SERPL-MCNC: 2.8 PG/ML (ref 2–4.4)
T4 FREE SERPL-MCNC: 1.01 NG/DL (ref 0.93–1.7)
TESTOST FREE SERPL-MCNC: 9.9 PG/ML (ref 7.2–24)
TESTOST SERPL-MCNC: 281 NG/DL (ref 264–916)
TIBC SERPL-MCNC: 441 MCG/DL
TRIGL SERPL-MCNC: 134 MG/DL (ref 0–150)
TSH SERPL DL<=0.005 MIU/L-ACNC: 0.97 UIU/ML (ref 0.27–4.2)
UIBC SERPL-MCNC: 341 MCG/DL (ref 112–346)
URATE SERPL-MCNC: 4.2 MG/DL (ref 3.4–7)
VIT B12 SERPL-MCNC: 439 PG/ML (ref 211–946)
VLDLC SERPL CALC-MCNC: 23 MG/DL (ref 5–40)
WBC # BLD AUTO: 6.99 10*3/MM3 (ref 3.4–10.8)

## 2020-12-28 DIAGNOSIS — IMO0002 UNCONTROLLED TYPE 2 DIABETES MELLITUS WITH COMPLICATION, WITHOUT LONG-TERM CURRENT USE OF INSULIN: ICD-10-CM

## 2020-12-28 DIAGNOSIS — R73.9 HYPERGLYCEMIA: ICD-10-CM

## 2020-12-28 DIAGNOSIS — E11.65 UNCONTROLLED TYPE 2 DIABETES MELLITUS WITH HYPERGLYCEMIA (HCC): ICD-10-CM

## 2020-12-28 RX ORDER — SYRING-NEEDL,DISP,INSUL,0.3 ML 30 GX5/16"
SYRINGE, EMPTY DISPOSABLE MISCELLANEOUS
Qty: 100 EACH | Refills: 2 | Status: CANCELLED | OUTPATIENT
Start: 2020-12-28

## 2020-12-28 NOTE — TELEPHONE ENCOUNTER
Caller: [facility], [place] - [address] - [phone]  - [phone] FX    Relationship: Pharmacy    Best call back number: [phone]  NASIR (CALL IF YOU HAVE ANY QUESTIONS)    Medication needed:   ADMELOG , TEST STRIPS, LANCETS, NEEDLES(FOR INSULIN, USING VALS)    Lancet Device misc   2 ordered  EditCancel Reorder       Summary: Test glucose 3-4 x daily, Normal          Requested Prescriptions      No prescriptions requested or ordered in this encounter       When do you need the refill by: TODAY    What details did the patient provide when requesting the medication:   PHARMACY CALLED STATED THAT MR. JI IS COMPLETELY OUT OF ABOVE LIST.    Does the patient have less than a 3 day supply:  [x] Yes  [] No    What is the patient's preferred pharmacy:    [facility], [place] - [address] - [phone] [facility] [phone] FX  [phone]

## 2020-12-29 RX ORDER — SYRING-NEEDL,DISP,INSUL,0.3 ML 30 GX5/16"
SYRINGE, EMPTY DISPOSABLE MISCELLANEOUS
Qty: 100 EACH | Refills: 2 | Status: SHIPPED | OUTPATIENT
Start: 2020-12-29 | End: 2022-12-01 | Stop reason: SDUPTHER

## 2020-12-29 RX ORDER — BLOOD-GLUCOSE METER
EACH MISCELLANEOUS
Qty: 100 EACH | Refills: 12 | Status: SHIPPED | OUTPATIENT
Start: 2020-12-29 | End: 2022-12-01 | Stop reason: SDUPTHER

## 2020-12-29 RX ORDER — PEN NEEDLE, DIABETIC 31 G X1/4"
NEEDLE, DISPOSABLE MISCELLANEOUS
Qty: 60 EACH | Refills: 4 | Status: SHIPPED | OUTPATIENT
Start: 2020-12-29 | End: 2022-12-08 | Stop reason: SDUPTHER

## 2020-12-30 DIAGNOSIS — E11.65 UNCONTROLLED TYPE 2 DIABETES MELLITUS WITH HYPERGLYCEMIA (HCC): ICD-10-CM

## 2020-12-30 RX ORDER — ERTUGLIFLOZIN AND METFORMIN HYDROCHLORIDE 7.5; 1 MG/1; MG/1
1 TABLET, FILM COATED ORAL 2 TIMES DAILY
Qty: 60 TABLET | Refills: 2 | Status: SHIPPED | OUTPATIENT
Start: 2020-12-30 | End: 2022-12-01 | Stop reason: SDUPTHER

## 2020-12-30 RX ORDER — INSULIN DEGLUDEC 200 U/ML
10 INJECTION, SOLUTION SUBCUTANEOUS DAILY
Qty: 6 PEN | Refills: 2 | Status: SHIPPED | OUTPATIENT
Start: 2020-12-30 | End: 2022-12-01 | Stop reason: SDUPTHER

## 2020-12-30 RX ORDER — DULAGLUTIDE 1.5 MG/.5ML
1.5 INJECTION, SOLUTION SUBCUTANEOUS WEEKLY
Qty: 4 PEN | Refills: 2 | Status: SHIPPED | OUTPATIENT
Start: 2020-12-30 | End: 2022-12-01 | Stop reason: SDUPTHER

## 2020-12-31 ENCOUNTER — TELEPHONE (OUTPATIENT)
Dept: FAMILY MEDICINE CLINIC | Facility: CLINIC | Age: 55
End: 2020-12-31

## 2020-12-31 NOTE — TELEPHONE ENCOUNTER
Patient coming to  samples.    [facility] please follow up on endo referral as there office is closed for the day.

## 2020-12-31 NOTE — TELEPHONE ENCOUNTER
Please have him  samples- we have some in refrigerator prior to PA. Also, ensure we schedule his follow up with endocrinology. He is an established patient and needs follow up ASAP. He cannot be without medications. Please ensure we get him medications at least until Monday when I can make further recommendations pending PA.

## 2020-12-31 NOTE — TELEPHONE ENCOUNTER
Pharmacy Name: [facility] [place]      Pharmacy representative name: BAN    Pharmacy representative phone number: [phone]      What medication are you calling in regards to: TRULICITY    What question does the pharmacy have: PRIOR AUTH NEEDED     Who is the provider that prescribed the medication: ELHAM LUDWIG    Additional notes: ZHANNA SAYS  PATIENT CALLED PHARMACY SAYS THAT HE WAS TOLD BY OFFICE  TO STOP INSULIN UNTIL PRIOR AUTH APPROVAL FOR TRULICITY.  HE IS NEEDING TO KNOW WHAT TO DO IN MEANTIME HE IS OUT OF INSULIN

## 2021-01-06 ENCOUNTER — OFFICE VISIT (OUTPATIENT)
Dept: CARDIOLOGY | Facility: CLINIC | Age: 56
End: 2021-01-06

## 2021-01-06 VITALS
WEIGHT: 239 LBS | HEART RATE: 71 BPM | HEIGHT: 70 IN | BODY MASS INDEX: 34.22 KG/M2 | SYSTOLIC BLOOD PRESSURE: 142 MMHG | DIASTOLIC BLOOD PRESSURE: 70 MMHG

## 2021-01-06 DIAGNOSIS — E53.8 B12 DEFICIENCY: ICD-10-CM

## 2021-01-06 DIAGNOSIS — I25.10 CORONARY ARTERY DISEASE INVOLVING NATIVE CORONARY ARTERY OF NATIVE HEART WITHOUT ANGINA PECTORIS: ICD-10-CM

## 2021-01-06 DIAGNOSIS — E11.65 UNCONTROLLED TYPE 2 DIABETES MELLITUS WITH HYPERGLYCEMIA (HCC): ICD-10-CM

## 2021-01-06 DIAGNOSIS — E55.9 VITAMIN D DEFICIENCY: ICD-10-CM

## 2021-01-06 DIAGNOSIS — E78.2 MIXED HYPERLIPIDEMIA: Primary | ICD-10-CM

## 2021-01-06 PROCEDURE — 99214 OFFICE O/P EST MOD 30 MIN: CPT | Performed by: INTERNAL MEDICINE

## 2021-01-06 PROCEDURE — 93000 ELECTROCARDIOGRAM COMPLETE: CPT | Performed by: INTERNAL MEDICINE

## 2021-01-06 NOTE — PROGRESS NOTES
Azael Carmen  1965  Date of Office Visit: 01/06/21  Encounter Provider: Gerard Rucker MD  Place of Service: [facility] CARDIOLOGY      CHIEF COMPLAINT:  Coronary artery disease with prior PCI of the RCA  Chronic total occlusion of the circumflex  Essential hypertension  Hyperlipidemia     HISTORY OF PRESENT ILLNESS:Dear Ms. Lacey,  I had the pleasure of seeing your patient in follow-up today. As you know, he is a very pleasant 55-year-old male who was recently incarcerated and subsequently has been released. He has a history of coronary disease with prior PCI to the RCA with a 3.5 mm bioabsorbable stent, that was post-dilated to 4 mm in diameter, preserved ejection fraction, chronic total occlusion of the circumflex, hypertension, hyperlipidemia, musculoskeletal pain and diabetes mellitus.  He presents to me in follow-up today.  Since our last visit, he has been doing well from a cardiac standpoint. He denies any chest pain or symptoms that are concerning for angina. He does have dyspnea on exertion, but is very inactive. This is mild in intensity. There is no orthopnea or PND. He continues to take his medications reasonably well. He did have a lipid panel in December and his LDL was 89 and his HDL was 67. His transaminases look good. The hemoglobin A1c was elevated at 8.6.            Review of Systems   Constitution: Negative for fever and malaise/fatigue.   HENT: Negative for nosebleeds and sore throat.    Eyes: Negative for blurred vision and double vision.   Cardiovascular: Positive for chest pain and dyspnea on exertion. Negative for claudication, palpitations and syncope.   Respiratory: Negative for cough, shortness of breath and snoring.    Endocrine: Negative for cold intolerance, heat intolerance and polydipsia.   Skin: Negative for itching, poor wound healing and rash.   Musculoskeletal: Positive for joint pain. Negative for joint swelling, muscle weakness and  myalgias.   Gastrointestinal: Negative for abdominal pain, melena, nausea and vomiting.   Neurological: Negative for light-headedness, loss of balance, seizures, vertigo and weakness.   Psychiatric/Behavioral: Negative for altered mental status and depression.          Past Medical History:   Diagnosis Date   • Allergic rhinitis    • Anxiety    • Arthritis    • Asthma    • Carpal tunnel syndrome    • Chest pain in adult    • Chronic back pain    • COPD (chronic obstructive pulmonary disease) (CMS/[facility])    • Coronary artery disease     PTCA 2016   • Depression    • Diabetes mellitus (CMS/[facility])    • Enlarged prostate    • Erectile dysfunction    • Heart murmur    • Heartburn    • History of MI (myocardial infarction)     ?? 2016   • History of MRSA infection     LEFT AXIALLRY MULITPLE TIMES   - ORAL ANTIBX ONLY (MOST RECENT 2018)   • History of substance abuse (CMS/[facility])    • Hypertension    • Lightheadedness    • Mixed hyperlipidemia 7/19/2016   • Neuropathy    • Sleep apnea     WEARS CPAP   • Swelling of lower extremity        The following portions of the patient's history were reviewed and updated as appropriate: Social history , Family history and Surgical history     Current Outpatient Medications on File Prior to Visit   Medication Sig Dispense Refill   • albuterol (ACCUNEB) 1.25 MG/3ML nebulizer solution Take 3 mL by nebulization Every 6 (Six) Hours As Needed for Wheezing. 120 vial 0   • ammonium lactate (AMLACTIN) 12 % cream Apply  topically to the appropriate area as directed As Needed for Dry Skin.     • ARIPiprazole (ABILIFY) 5 MG tablet Take 5 mg by mouth Daily.  0   • aspirin 81 MG tablet Take 81 mg by mouth Daily. TO CHECK WITH MD REGARDING HELD DATE     • atorvastatin (LIPITOR) 20 MG tablet Take 20 mg by mouth Daily.     • Cholecalciferol (VITAMIN D3) 06215 units capsule Take 1 capsule by mouth 2 (Two) Times a Week. 24 capsule 2   • Fluticasone Furoate-Vilanterol (BREO ELLIPTA) 100-25 MCG/INH inhaler  Inhale 1 puff Daily.     • folic acid (FOLVITE) 1 MG tablet Take 1 tablet by mouth Daily. 30 tablet 3   • furosemide (LASIX) 40 MG tablet Take 1 tablet by mouth Daily. 30 tablet 0   • glucose blood (Assure Platinum) test strip Use as instructed 100 each 12   • hydrOXYzine (ATARAX) 50 MG tablet Take 1 tablet by mouth Every Night. 30 tablet 0   • Insulin Pen Needle (Pen Needles) 31G X 6 MM misc One inj daily 60 each 4   • Lancet Device misc Test glucose 3-4 x daily 100 each 2   • levalbuterol (XOPENEX HFA) 45 MCG/ACT inhaler INHALE 2 PUFFS BY MOUTH EVERY FOUR HOURS AS NEEDED 15 g 0   • losartan (COZAAR) 100 MG tablet Take 100 mg by mouth Daily.     • methotrexate 2.5 MG tablet Take 15 mg by mouth 1 (One) Time Per Week. FRIDAYS     • montelukast (SINGULAIR) 10 MG tablet Take 10 mg by mouth Daily.     • Multiple Vitamins-Minerals (CERTAVITE/ANTIOXIDANTS) tablet TAKE 1 TABLET BY MOUTH EVERY DAY 90 tablet 0   • Naltrexone (VIVITROL) 380 MG reconstituted suspension IM suspension Inject 380 mg into the appropriate muscle as directed by prescriber Every 30 (Thirty) Days.     • potassium chloride (K-DUR) 10 MEQ CR tablet Take 10 mEq by mouth 2 (Two) Times a Day.     • Segluromet 7.5-1000 MG tablet Take 1 tablet by mouth 2 (two) times a day. 60 tablet 2   • sertraline (ZOLOFT) 100 MG tablet Take 100 mg by mouth Every Night.     • tadalafil (CIALIS) 5 MG tablet Take 1 tablet by mouth Daily As Needed for erectile dysfunction. 30 tablet 4   • tamsulosin (FLOMAX) 0.4 MG capsule 24 hr capsule Take 1 capsule by mouth Daily.     • Tresiba FlexTouch 200 UNIT/ML solution pen-injector pen injection Inject 10 Units under the skin into the appropriate area as directed Daily. 6 pen 2   • Triamcinolone Acetonide (NASACORT) 55 MCG/ACT nasal inhaler INHALE 2 SPRAYS INTO EACH NOSTRIL EVERY DAY 16.9 mL 0   • Trulicity 1.5 MG/0.5ML solution pen-injector Inject 1.5 mg under the skin into the appropriate area as directed 1 (One) Time Per Week. 4  "pen 2   • vitamin B-12 (CYANOCOBALAMIN) 1000 MCG tablet Take 1 tablet by mouth Daily. 30 tablet 4     No current facility-administered medications on file prior to visit.        Allergies   Allergen Reactions   • Cymbalta [Duloxetine Hcl] Hives   • Penicillins Rash       Vitals:    01/06/21 1526   BP: 142/70   Pulse: 71   Weight: 108 kg (239 lb)   Height: 177.8 cm (70\")     Physical Exam   Constitutional: He is oriented to person, place, and time. He appears well-developed and well-nourished.   HENT:   Head: Normocephalic and atraumatic.   Eyes: Conjunctivae and EOM are normal. No scleral icterus.   Neck: Normal range of motion. Neck supple. Normal carotid pulses, no hepatojugular reflux and no JVD present. Carotid bruit is not present. No tracheal deviation present. No thyromegaly present.   Cardiovascular: Normal rate and regular rhythm. Exam reveals no gallop and no friction rub.   No murmur heard.  Pulses:       Carotid pulses are 2+ on the right side and 2+ on the left side.       Radial pulses are 2+ on the right side and 2+ on the left side.        Femoral pulses are 2+ on the right side and 2+ on the left side.       Dorsalis pedis pulses are 2+ on the right side and 2+ on the left side.        Posterior tibial pulses are 2+ on the right side and 2+ on the left side.   Pulmonary/Chest: Breath sounds normal. No respiratory distress. He has no decreased breath sounds. He has no wheezes. He has no rhonchi. He has no rales. He exhibits no tenderness.   Abdominal: Soft. Bowel sounds are normal. He exhibits no distension. There is no abdominal tenderness. There is no rebound.   Musculoskeletal:         General: No deformity or edema.   Neurological: He is alert and oriented to person, place, and time. He has normal strength. No sensory deficit.   Skin: No rash noted. No erythema.   Psychiatric: He has a normal mood and affect. His behavior is normal.           No results found for: CBC  No results found for: " CMP  No components found for: LIPID  No results found for: BMP      ECG 12 Lead    Date/Time: 1/6/2021 3:53 PM  Performed by: Gerard Rucker MD  Authorized by: Gerard Rucker MD   Comparison: compared with previous ECG from 2/21/2019  Similar to previous ECG  Rhythm: sinus rhythm  Rate: normal  QRS axis: normal              DISCUSSION/SUMMARY  55-year-old male with medical history of coronary artery disease, prior PCI to the RCA with a bioabsorbable stent in 2016, chronic total occlusion of the circumflex, hypertension, hyperlipidemia, diabetes mellitus, and musculoskeletal pain who presents to me to me in follow-up.  He currently is doing well.  He denies any symptoms consistent with angina.  He does have dyspnea exertion but is very and active and overweight.    1. Coronary artery disease with prior PCI to the RCA.  No angina.  - of the circumflex.  -Continue aspirin 81 mg daily lifelong.   -Not on beta blocker secondary to resting bradycardia.  -Continue atorvastatin therapy at current dose.  LDL is elevated at 89.  I recommended doubling the atorvastatin to 40 mg p.o. nightly.  Transaminases are normal.  Has not had any issues with myalgias on this medication.  2. Essential hypertension: Mildly elevated today.  This has been reasonably controlled I would not recommend changes in his current medical regimen.  Tolerating ARB without hyperkalemia or elevated creatinine on last lab work.  3. Diabetes mellitus: Per primary.       I will plan on seeing him back in 6 months or earlier with problems.

## 2021-01-06 NOTE — TELEPHONE ENCOUNTER
.    Caller: YIMI JI    Relationship: SELF    Best call back number: [phone]    Medication needed:   Requested Prescriptions     Pending Prescriptions Disp Refills   • potassium chloride 10 MEQ CR tablet        Sig: Take 1 tablet by mouth.   • losartan (COZAAR) 100 MG tablet        Sig: Take 1 tablet by mouth Daily.   • Cholecalciferol (Vitamin D3) 1.25 MG (47430 UT) capsule 24 capsule 2     Sig: Take 1 capsule by mouth 2 (Two) Times a Week.   • multivitamin with minerals (certavite/antioxidants) tablet tablet 90 tablet 0     Sig: Take 1 tablet by mouth Daily.   • montelukast (SINGULAIR) 10 MG tablet        Sig: Take 1 tablet by mouth Daily.   • atorvastatin (LIPITOR) 20 MG tablet        Sig: Take 1 tablet by mouth Daily.   • furosemide (LASIX) 40 MG tablet 30 tablet 0     Sig: Take 1 tablet by mouth Daily.   • Fluticasone Furoate-Vilanterol (Breo Ellipta) 100-25 MCG/INH inhaler        Sig: Inhale 1 puff Daily.   • vitamin B-12 (CYANOCOBALAMIN) 1000 MCG tablet 30 tablet 4     Sig: Take 1 tablet by mouth Daily.   • levalbuterol (XOPENEX HFA) 45 MCG/ACT inhaler 15 g 0     Si puffs Every 4 (Four) Hours As Needed.       When do you need the refill by: ASAP    What details did the patient provide when requesting the medication: PATIENT STATES THAT HE IS OUT OF ALL MEDICATIONS    Does the patient have less than a 3 day supply:  [x] Yes  [] No    What is the patient's preferred pharmacy: [facility] [address] - [phone]  - [phone] FX

## 2021-01-07 ENCOUNTER — TELEPHONE (OUTPATIENT)
Dept: FAMILY MEDICINE CLINIC | Facility: CLINIC | Age: 56
End: 2021-01-07

## 2021-01-07 RX ORDER — FUROSEMIDE 40 MG/1
40 TABLET ORAL DAILY
Qty: 90 TABLET | Refills: 0 | Status: SHIPPED | OUTPATIENT
Start: 2021-01-07 | End: 2021-05-06

## 2021-01-07 RX ORDER — LANOLIN ALCOHOL/MO/W.PET/CERES
1000 CREAM (GRAM) TOPICAL DAILY
Qty: 90 TABLET | Refills: 0 | Status: SHIPPED | OUTPATIENT
Start: 2021-01-07 | End: 2022-12-19 | Stop reason: ALTCHOICE

## 2021-01-07 RX ORDER — POTASSIUM CHLORIDE 750 MG/1
10 TABLET, FILM COATED, EXTENDED RELEASE ORAL DAILY
Qty: 90 TABLET | Refills: 0 | Status: SHIPPED | OUTPATIENT
Start: 2021-01-07 | End: 2021-05-06

## 2021-01-07 RX ORDER — MONTELUKAST SODIUM 10 MG/1
10 TABLET ORAL NIGHTLY
Qty: 90 TABLET | Refills: 0 | Status: SHIPPED | OUTPATIENT
Start: 2021-01-07 | End: 2021-05-06

## 2021-01-07 RX ORDER — LEVALBUTEROL TARTRATE 45 UG/1
2 AEROSOL, METERED ORAL EVERY 6 HOURS PRN
Qty: 15 G | Refills: 5 | Status: SHIPPED | OUTPATIENT
Start: 2021-01-07 | End: 2021-03-11

## 2021-01-07 RX ORDER — MULTIPLE VITAMINS W/ MINERALS TAB 9MG-400MCG
1 TAB ORAL DAILY
Qty: 90 TABLET | Refills: 0 | Status: SHIPPED | OUTPATIENT
Start: 2021-01-07 | End: 2021-01-12

## 2021-01-07 RX ORDER — CHOLECALCIFEROL (VITAMIN D3) 1250 MCG
50000 CAPSULE ORAL 2 TIMES WEEKLY
Qty: 26 CAPSULE | Refills: 0 | Status: SHIPPED | OUTPATIENT
Start: 2021-01-07 | End: 2021-05-06 | Stop reason: SDUPTHER

## 2021-01-07 RX ORDER — LOSARTAN POTASSIUM 100 MG/1
100 TABLET ORAL DAILY
Qty: 90 TABLET | Refills: 0 | Status: SHIPPED | OUTPATIENT
Start: 2021-01-07 | End: 2021-05-06

## 2021-01-07 RX ORDER — ATORVASTATIN CALCIUM 20 MG/1
20 TABLET, FILM COATED ORAL NIGHTLY
Qty: 90 TABLET | Refills: 0 | Status: SHIPPED | OUTPATIENT
Start: 2021-01-07 | End: 2021-01-08 | Stop reason: SDUPTHER

## 2021-01-07 NOTE — TELEPHONE ENCOUNTER
----- Message from Chelly Barriga sent at 1/7/2021  3:56 PM EST -----  Contact: [phone]  [facility] called about the prior auth for his Trulicity  Their best call back is [phone]

## 2021-01-08 RX ORDER — ATORVASTATIN CALCIUM 20 MG/1
40 TABLET, FILM COATED ORAL NIGHTLY
Qty: 90 TABLET | Refills: 4 | Status: SHIPPED | OUTPATIENT
Start: 2021-01-08 | End: 2021-01-18 | Stop reason: ALTCHOICE

## 2021-01-11 ENCOUNTER — TELEPHONE (OUTPATIENT)
Dept: FAMILY MEDICINE CLINIC | Facility: CLINIC | Age: 56
End: 2021-01-11

## 2021-01-11 DIAGNOSIS — L40.9 PSORIASIS: Primary | ICD-10-CM

## 2021-01-11 NOTE — TELEPHONE ENCOUNTER
PT CALLED TO REQUEST A NEW REFERRAL TO [facility] FOR DR. DUKE.    PH: [phone] (PT SPOKE TO A WOMAN NAMED RAUL AT THAT OFFICE)  FX: [phone]    HE HAS AN APPT WITH THEM NEXT Tuesday 1/19, AND THEY MUST HAVE THE REFERRAL BEFORE THEN.    CALLBACK NUMBER: [phone]

## 2021-01-12 RX ORDER — FOLIC ACID/MV,IRON,MIN/LUTEIN 0.4-18-25
TABLET ORAL
Qty: 90 TABLET | Refills: 0 | Status: SHIPPED | OUTPATIENT
Start: 2021-01-12 | End: 2022-12-19

## 2021-01-12 RX ORDER — ASPIRIN 81 MG/1
TABLET, DELAYED RELEASE ORAL
Qty: 90 TABLET | Refills: 0 | Status: SHIPPED | OUTPATIENT
Start: 2021-01-12 | End: 2022-12-19

## 2021-01-18 ENCOUNTER — OFFICE VISIT (OUTPATIENT)
Dept: GASTROENTEROLOGY | Facility: CLINIC | Age: 56
End: 2021-01-18

## 2021-01-18 VITALS — BODY MASS INDEX: 33.64 KG/M2 | HEIGHT: 70 IN | WEIGHT: 235 LBS

## 2021-01-18 DIAGNOSIS — K80.20 CALCULUS OF GALLBLADDER WITHOUT CHOLECYSTITIS WITHOUT OBSTRUCTION: ICD-10-CM

## 2021-01-18 DIAGNOSIS — E66.9 CLASS 1 OBESITY IN ADULT, UNSPECIFIED BMI, UNSPECIFIED OBESITY TYPE, UNSPECIFIED WHETHER SERIOUS COMORBIDITY PRESENT: Primary | ICD-10-CM

## 2021-01-18 PROCEDURE — 99204 OFFICE O/P NEW MOD 45 MIN: CPT | Performed by: INTERNAL MEDICINE

## 2021-01-18 RX ORDER — ERGOCALCIFEROL 1.25 MG/1
CAPSULE ORAL
COMMUNITY
Start: 2021-01-07 | End: 2021-02-01

## 2021-01-18 RX ORDER — ATORVASTATIN CALCIUM 40 MG/1
TABLET, FILM COATED ORAL
COMMUNITY
Start: 2021-01-08 | End: 2022-12-01 | Stop reason: SDUPTHER

## 2021-01-18 NOTE — PROGRESS NOTES
"Chief Complaint   Patient presents with   • ABN CT Liver     Subjective   HPI  Azael Carmen is a 55 y.o. male who presents today for new patient evaluation.    Patient states he is here to \"get checked out\" he is overall poor historian.  The referral has requested evaluation for abnormal liver enzymes and cholelithiasis.  I reviewed imaging available through [facility] everywhere it appears his last GI related imaging was an ultrasound of the liver a year ago.  He had evidence of gallstones without cholecystitis.  There is no dilation of the common bile duct.  He had somewhat nodular appearing liver there was mention of possible cirrhosis.    I reviewed his recent blood work which shows normal liver enzymes.  His CBC shows normal platelet count.  His weight today is 235 pounds (BMI 33.7).    He has no symptoms consistent with biliary colic.  He denies any right upper quadrant abdominal pain or postprandial nausea.  He reports a normal colonoscopy performed at the age of 48.  Objective   There were no vitals filed for this visit.  Physical Exam  Vitals signs reviewed.   Constitutional:       Appearance: He is well-developed.   HENT:      Head: Normocephalic and atraumatic.   Abdominal:      General: Bowel sounds are normal. There is no distension.      Palpations: Abdomen is soft. There is no mass.      Tenderness: There is no abdominal tenderness.      Hernia: No hernia is present.   Skin:     General: Skin is warm and dry.   Neurological:      Mental Status: He is alert and oriented to person, place, and time.   Psychiatric:         Behavior: Behavior normal.         Thought Content: Thought content normal.         Judgment: Judgment normal.       The following data was reviewed by: Bishop Segovia MD on 01/18/2021:  CMP    CMP 12/22/20   Glucose 205 (A)   BUN 20   Creatinine 0.77   eGFR Non  Am 104   eGFR African Am 126   Sodium 136   Potassium 4.6   Chloride 100   Calcium 9.5   Total Protein " 6.8   Albumin 4.30   Globulin 2.5   Total Bilirubin 0.8   Alkaline Phosphatase 66   AST (SGOT) 10   ALT (SGPT) 15   (A) Abnormal value       Comments are available for some flowsheets but are not being displayed.           CBC    CBC 12/22/20   WBC 6.99   RBC 4.82   Hemoglobin 14.6   Hematocrit 43.0   MCV 89.2   MCH 30.3   MCHC 34.0   RDW 12.5   Platelets 336           Data reviewed: Radiologic studies U/S liver from 2/2020   Assessment/Plan   Assessment:     1. Class 1 obesity in adult, unspecified BMI, unspecified obesity type, unspecified whether serious comorbidity present    2. Calculus of gallbladder without cholecystitis without obstruction    3.      Abnormal liver ultrasound    Plan:   This gentleman has evidence of hepatic steatosis on his ultrasound with questionable nodularity although his liver enzymes and CBC are in no way suggestive of significant underlying liver disease.  He also had note of cholelithiasis without cholecystitis or biliary obstruction.  He is not having any symptoms suggestive of biliary colic and it is hard to see an indication for referral for cholecystectomy at this time.  I will refer him to have updated RUQ u/s.            Bishop Segovia M.D.  [facility]  [address] [place] [address]  Office: [phone]

## 2021-01-26 ENCOUNTER — TELEPHONE (OUTPATIENT)
Dept: FAMILY MEDICINE CLINIC | Facility: CLINIC | Age: 56
End: 2021-01-26

## 2021-02-01 ENCOUNTER — OFFICE VISIT (OUTPATIENT)
Dept: ENDOCRINOLOGY | Age: 56
End: 2021-02-01

## 2021-02-01 VITALS
BODY MASS INDEX: 32.58 KG/M2 | HEIGHT: 70 IN | DIASTOLIC BLOOD PRESSURE: 80 MMHG | SYSTOLIC BLOOD PRESSURE: 142 MMHG | OXYGEN SATURATION: 98 % | WEIGHT: 227.6 LBS | HEART RATE: 81 BPM

## 2021-02-01 DIAGNOSIS — E29.1 HYPOGONADISM IN MALE: ICD-10-CM

## 2021-02-01 DIAGNOSIS — E78.2 MIXED HYPERLIPIDEMIA: ICD-10-CM

## 2021-02-01 DIAGNOSIS — N52.9 ED (ERECTILE DYSFUNCTION) OF ORGANIC ORIGIN: ICD-10-CM

## 2021-02-01 DIAGNOSIS — E66.01 CLASS 3 SEVERE OBESITY WITHOUT SERIOUS COMORBIDITY WITH BODY MASS INDEX (BMI) OF 40.0 TO 44.9 IN ADULT, UNSPECIFIED OBESITY TYPE (HCC): Primary | ICD-10-CM

## 2021-02-01 DIAGNOSIS — E11.65 UNCONTROLLED TYPE 2 DIABETES MELLITUS WITH HYPERGLYCEMIA (HCC): ICD-10-CM

## 2021-02-01 PROCEDURE — 99214 OFFICE O/P EST MOD 30 MIN: CPT | Performed by: INTERNAL MEDICINE

## 2021-02-01 RX ORDER — TADALAFIL 5 MG/1
5 TABLET ORAL DAILY PRN
Qty: 90 TABLET | Refills: 1 | Status: SHIPPED | OUTPATIENT
Start: 2021-02-01 | End: 2022-12-19 | Stop reason: CLARIF

## 2021-02-01 RX ORDER — PIOGLITAZONEHYDROCHLORIDE 15 MG/1
15 TABLET ORAL DAILY
Qty: 30 TABLET | Refills: 11 | Status: SHIPPED | OUTPATIENT
Start: 2021-02-01 | End: 2022-12-01

## 2021-02-01 NOTE — PROGRESS NOTES
"Chief Complaint  Chief Complaint   Patient presents with   • Diabetes   FOLLOW UP/ TYPE 2 DM       Subjective          Azael Caremn presents to [facility] ENDOCRINOLOGY for   Patient is a 55-year-old male with past medical history of poorly controlled diabetes mellitus type 2 hypogonadism and obesity who presents for follow-up.    Diabetes  Most recent Hgb A1c 8.6. Checks BG three times daily. Notes that he left both his list and his meter at home today. Patient is currently taking Trulicity 1.5 mg weekly, Segluromet 7.5/1001 tab twice daily, Tresiba 10 units daily. Patient notes that he was incarcerated about 5 months ago and they stopped all his meds and started him on insulin. He notes that he was previously on cycloset, but this was not restarted. Denies polyuria or polydipsia. Last eye exam was about 4 years ago. Notes that he plans to get this set.Hen has numbness in his fingers. Villa has had signficant weight loss over the last years. In 2019 he was 261 lbs, currently 227 lbs. Pt notes the he rides the stationary bike 5x weekly for about 30 minutes. He is trying to reduce his carbs but has a hardtime with tea.  The patient notes relative hypoglycemia when blood sugars are in the 80s to 100.      Hypogonadism  Patient notes that he has not had testo for 4 yewars. He does not have morning erections or erections owhen stimulated. He notes occasional headaches that are at the base of his neck. His most recet Testostroen was 268. His LH and FSH are normal.      Obesity  BMI 32.66. Patient notes that he has had significant weight loss.In 2019, he was 261lbs at his last endocrine visit, a weight loss of almost 40lbs. He notes that his weight loss is intentional. He is exercising and trying to reduce carbs. His diabetes has not been well controlled, which can also cause weight loss.      Objective   Vital Signs:   /80   Pulse 81   Ht 177.8 cm (70\")   Wt 103 kg (227 lb 9.6 oz)   SpO2 " 98%   BMI 32.66 kg/m²     Physical Exam  Vitals signs reviewed.   Constitutional:       Appearance: Normal appearance. He is normal weight.   HENT:      Mouth/Throat:      Mouth: Mucous membranes are moist.   Eyes:      Extraocular Movements: Extraocular movements intact.      Conjunctiva/sclera: Conjunctivae normal.      Pupils: Pupils are equal, round, and reactive to light.   Cardiovascular:      Rate and Rhythm: Normal rate and regular rhythm.      Pulses: Normal pulses.           Dorsalis pedis pulses are 2+ on the right side and 2+ on the left side.      Heart sounds: Normal heart sounds.   Abdominal:      General: Abdomen is flat. Bowel sounds are normal.      Palpations: Abdomen is soft.   Feet:      Right foot:      Protective Sensation: 10 sites tested. 10 sites sensed.      Skin integrity: Skin integrity normal.      Left foot:      Protective Sensation: 10 sites tested. 10 sites sensed.      Skin integrity: Skin integrity normal.   Skin:     General: Skin is warm and dry.   Neurological:      General: No focal deficit present.      Mental Status: He is oriented to person, place, and time.      Cranial Nerves: Cranial nerves are intact.      Sensory: Sensation is intact.      Deep Tendon Reflexes: Reflexes are normal and symmetric.   Psychiatric:         Mood and Affect: Mood normal.         Behavior: Behavior normal.        Result Review :   The following data was reviewed by: Brown Oliva MD on 02/01/2021:  CMP    CMP 12/22/20   Glucose 205 (A)   BUN 20   Creatinine 0.77   eGFR Non  Am 104   eGFR African Am 126   Sodium 136   Potassium 4.6   Chloride 100   Calcium 9.5   Total Protein 6.8   Albumin 4.30   Globulin 2.5   Total Bilirubin 0.8   Alkaline Phosphatase 66   AST (SGOT) 10   ALT (SGPT) 15   (A) Abnormal value       Comments are available for some flowsheets but are not being displayed.           CBC w/diff    CBC w/Diff 12/22/20   WBC 6.99   RBC 4.82   Hemoglobin 14.6   Hematocrit  43.0   MCV 89.2   MCH 30.3   MCHC 34.0   RDW 12.5   Platelets 336   Neutrophil Rel % 54.0   Lymphocyte Rel % 36.3   Monocyte Rel % 7.0   Eosinophil Rel % 1.7   Basophil Rel % 0.9           Lipid Panel    Lipid Panel 12/22/20   Total Cholesterol 179   Triglycerides 134   HDL Cholesterol 67 (A)   VLDL Cholesterol 23   LDL Cholesterol  89   LDL/HDL Ratio 1.27   (A) Abnormal value       Comments are available for some flowsheets but are not being displayed.           TSH    TSH 12/22/20   TSH 0.968           Electrolytes    Electrolytes 12/22/20   Sodium 136   Potassium 4.6   Chloride 100   Calcium 9.5           Renal Profile    Renal Profile 12/22/20   BUN 20   Creatinine 0.77   eGFR Non  Am 104   eGFR  Am 126      Comments are available for some flowsheets but are not being displayed.           A1C Last 3 Results    HGBA1C Last 3 Results 12/22/20   Hemoglobin A1C 8.60 (A)   (A) Abnormal value       Comments are available for some flowsheets but are not being displayed.               UA    Urinalysis 12/22/20   Ketones, UA Negative   Leukocytes, UA Negative                       Data reviewed: Labs and chart reviewed          Assessment and Plan    Problem List Items Addressed This Visit        Other    Diabetes mellitus type 2, uncontrolled (CMS/[facility])    Current Assessment & Plan     Goal Hgb A1c <7  Goal fasting BG <130 mg/dl  Goal BG prior to meal <180  Most recent hgb A1c was   Hemoglobin A1C   Date Value Ref Range Status   12/22/2020 8.60 (H) 4.80 - 5.60 % Final     Comment:     Hemoglobin A1C Ranges:  Increased Risk for Diabetes  5.7% to 6.4%  Diabetes                     >= 6.5%  Diabetic Goal                < 7.0%     02/11/2019 10.6 % Final   10/08/2016 7.34 (H) 4.80 - 5.60 % Final      A1c above goal   Last foot exam today notes tenia pedis, but normal otherwise  Encourage low carb diet with no more than 45 gm of cab per meal  We will start pioglitazone 15 mg 1 tab daily  We will increase Tresiba  to 15 units daily  We will have the patient increase Tresiba by 2 units every 72 hours until his fasting blood sugar falls into the target range of 80 to 130 mg/dL  Continue Trulicity 1.5 mg weekly and Serglumet 7.5/1000mg BID         Relevant Medications    pioglitazone (Actos) 15 MG tablet    Other Relevant Orders    Hemoglobin A1c    Comprehensive Metabolic Panel    Microalbumin / Creatinine Urine Ratio - Urine, Clean Catch    TSH    Vitamin B12 & Folate    Vitamin D 25 Hydroxy    Lipid Panel    eGFR-Glomerular Filtration    ED (erectile dysfunction) of organic origin    Current Assessment & Plan     Current testosterone levels are in low normal range.  Possible that this will improve with better control of his diabetes and his weight  Will start Cialis prn for ED         Relevant Orders    Hemoglobin A1c    Comprehensive Metabolic Panel    Microalbumin / Creatinine Urine Ratio - Urine, Clean Catch    TSH    Vitamin B12 & Folate    Vitamin D 25 Hydroxy    Lipid Panel    eGFR-Glomerular Filtration    Mixed hyperlipidemia    Current Assessment & Plan     LDL at goal of <100  Continue Lipitor 40mg po qd  HDL 67 which will also help to reduce cardiac risk.         Hypogonadism in male    Relevant Orders    Hemoglobin A1c    Comprehensive Metabolic Panel    Microalbumin / Creatinine Urine Ratio - Urine, Clean Catch    TSH    Vitamin B12 & Folate    Vitamin D 25 Hydroxy    Lipid Panel    eGFR-Glomerular Filtration    Class 3 severe obesity without serious comorbidity with body mass index (BMI) of 40.0 to 44.9 in adult (CMS/[facility]) - Primary    Current Assessment & Plan     Weight       02/01/21  0924   Weight: 103 kg (227 lb 9.6 oz)      BMI Body mass index is 32.66 kg/m².   Goal weight loss >/= 5% of TBW q 3 months  Continue with lifestyle management  Consider increasing Trulicity to 3 mg weekly    Patient currently has a GLP-1 and SGLT2 inhibitor on board which should help with weight           Relevant Orders     Hemoglobin A1c    Comprehensive Metabolic Panel    Microalbumin / Creatinine Urine Ratio - Urine, Clean Catch    TSH    Vitamin B12 & Folate    Vitamin D 25 Hydroxy    Lipid Panel    eGFR-Glomerular Filtration        I spent 35 minutes caring for Azael on this date of service. This time includes time spent by me in the following activities:preparing for the visit  Follow Up   Return in about 3 months (around 5/1/2021).  Patient was given instructions and counseling regarding his condition or for health maintenance advice. Please see specific information pulled into the AVS if appropriate.

## 2021-02-01 NOTE — PATIENT INSTRUCTIONS
Please increase your Tresiba to 15 units.    Continue this dose for 3 days.  After 3 days if your fasting blood sugar is not falling into the target range of 80 mg/dL to 130 mg/dL please increase your Tresiba by 2 units.  Please do this every 72 hours until your blood sugars fall into that target range.

## 2021-02-01 NOTE — ASSESSMENT & PLAN NOTE
Weight       02/01/21 0924   Weight: 103 kg (227 lb 9.6 oz)      BMI Body mass index is 32.66 kg/m².   Goal weight loss >/= 5% of TBW q 3 months  Continue with lifestyle management  Consider increasing Trulicity to 3 mg weekly    Patient currently has a GLP-1 and SGLT2 inhibitor on board which should help with weight

## 2021-02-01 NOTE — ASSESSMENT & PLAN NOTE
Goal Hgb A1c <7  Goal fasting BG <130 mg/dl  Goal BG prior to meal <180  Most recent hgb A1c was   Hemoglobin A1C   Date Value Ref Range Status   12/22/2020 8.60 (H) 4.80 - 5.60 % Final     Comment:     Hemoglobin A1C Ranges:  Increased Risk for Diabetes  5.7% to 6.4%  Diabetes                     >= 6.5%  Diabetic Goal                < 7.0%     02/11/2019 10.6 % Final   10/08/2016 7.34 (H) 4.80 - 5.60 % Final      A1c above goal   Last foot exam today notes tenia pedis, but normal otherwise  Encourage low carb diet with no more than 45 gm of cab per meal  We will start pioglitazone 15 mg 1 tab daily  We will increase Tresiba to 15 units daily  We will have the patient increase Tresiba by 2 units every 72 hours until his fasting blood sugar falls into the target range of 80 to 130 mg/dL  Continue Trulicity 1.5 mg weekly and Serglumet 7.5/1000mg BID

## 2021-02-01 NOTE — ASSESSMENT & PLAN NOTE
LDL at goal of <100  Continue Lipitor 40mg po qd  HDL 67 which will also help to reduce cardiac risk.

## 2021-02-01 NOTE — ASSESSMENT & PLAN NOTE
Current testosterone levels are in low normal range.  Possible that this will improve with better control of his diabetes and his weight  Will start Cialis prn for ED

## 2021-03-01 ENCOUNTER — TELEPHONE (OUTPATIENT)
Dept: FAMILY MEDICINE CLINIC | Facility: CLINIC | Age: 56
End: 2021-03-01

## 2021-03-01 NOTE — TELEPHONE ENCOUNTER
I have spoke to April at the pharmacy and told her that I have now completed the PA and will await the determination. She verbalized understanding

## 2021-03-01 NOTE — TELEPHONE ENCOUNTER
Pharmacy Name:  [facility] ([place], [place]), [place] - [address] - [phone]  - [phone]  [phone]    Pharmacy representative name: April    Pharmacy representative phone number: [phone]    What medication are you calling in regards to: Inhaler    What question does the pharmacy have: She needs to know if this has been approved as she has not heard back since January.    Who is the provider that prescribed the medication: Ms. Lacey    Additional notes:

## 2021-03-08 ENCOUNTER — TELEPHONE (OUTPATIENT)
Dept: FAMILY MEDICINE CLINIC | Facility: CLINIC | Age: 56
End: 2021-03-08

## 2021-03-08 DIAGNOSIS — J44.9 COPD, MILD (HCC): Primary | ICD-10-CM

## 2021-03-11 RX ORDER — ALBUTEROL SULFATE 90 UG/1
2 AEROSOL, METERED RESPIRATORY (INHALATION) EVERY 6 HOURS PRN
Qty: 18 G | Refills: 1 | Status: SHIPPED | OUTPATIENT
Start: 2021-03-11 | End: 2021-05-27

## 2021-05-06 DIAGNOSIS — E55.9 VITAMIN D DEFICIENCY, UNSPECIFIED: ICD-10-CM

## 2021-05-06 RX ORDER — MONTELUKAST SODIUM 10 MG/1
TABLET ORAL
Qty: 90 TABLET | Refills: 0 | Status: SHIPPED | OUTPATIENT
Start: 2021-05-06 | End: 2022-12-01 | Stop reason: SDUPTHER

## 2021-05-06 RX ORDER — POTASSIUM CHLORIDE 750 MG/1
TABLET, FILM COATED, EXTENDED RELEASE ORAL
Qty: 90 TABLET | Refills: 0 | Status: SHIPPED | OUTPATIENT
Start: 2021-05-06 | End: 2022-12-01 | Stop reason: SDUPTHER

## 2021-05-06 RX ORDER — ERGOCALCIFEROL 1.25 MG/1
CAPSULE ORAL
Qty: 26 CAPSULE | Refills: 0 | Status: SHIPPED | OUTPATIENT
Start: 2021-05-06 | End: 2022-12-19

## 2021-05-06 RX ORDER — FUROSEMIDE 40 MG/1
TABLET ORAL
Qty: 90 TABLET | Refills: 0 | Status: SHIPPED | OUTPATIENT
Start: 2021-05-06 | End: 2022-12-01 | Stop reason: SDUPTHER

## 2021-05-06 RX ORDER — LOSARTAN POTASSIUM 100 MG/1
TABLET ORAL
Qty: 90 TABLET | Refills: 0 | Status: SHIPPED | OUTPATIENT
Start: 2021-05-06 | End: 2022-12-01 | Stop reason: SDUPTHER

## 2021-05-26 DIAGNOSIS — J44.9 COPD, MILD (HCC): ICD-10-CM

## 2021-05-28 RX ORDER — ALBUTEROL SULFATE 90 UG/1
AEROSOL, METERED RESPIRATORY (INHALATION)
Qty: 18 G | Refills: 0 | Status: SHIPPED | OUTPATIENT
Start: 2021-05-28 | End: 2022-12-01 | Stop reason: SDUPTHER

## 2022-11-30 ENCOUNTER — OFFICE VISIT (OUTPATIENT)
Dept: FAMILY MEDICINE CLINIC | Facility: CLINIC | Age: 57
End: 2022-11-30

## 2022-11-30 VITALS
TEMPERATURE: 97 F | HEIGHT: 70 IN | OXYGEN SATURATION: 96 % | RESPIRATION RATE: 16 BRPM | DIASTOLIC BLOOD PRESSURE: 88 MMHG | BODY MASS INDEX: 34.65 KG/M2 | WEIGHT: 242 LBS | HEART RATE: 90 BPM | SYSTOLIC BLOOD PRESSURE: 150 MMHG

## 2022-11-30 DIAGNOSIS — R93.2 ABNORMAL LIVER DIAGNOSTIC IMAGING: ICD-10-CM

## 2022-11-30 DIAGNOSIS — E53.8 FOLATE DEFICIENCY: ICD-10-CM

## 2022-11-30 DIAGNOSIS — E29.1 HYPOGONADISM IN MALE: ICD-10-CM

## 2022-11-30 DIAGNOSIS — I25.10 CORONARY ARTERY DISEASE INVOLVING NATIVE CORONARY ARTERY OF NATIVE HEART WITHOUT ANGINA PECTORIS: ICD-10-CM

## 2022-11-30 DIAGNOSIS — N50.812 LEFT TESTICULAR PAIN: ICD-10-CM

## 2022-11-30 DIAGNOSIS — E78.2 MIXED HYPERLIPIDEMIA: ICD-10-CM

## 2022-11-30 DIAGNOSIS — N52.9 ED (ERECTILE DYSFUNCTION) OF ORGANIC ORIGIN: ICD-10-CM

## 2022-11-30 DIAGNOSIS — N40.0 BENIGN PROSTATIC HYPERPLASIA, UNSPECIFIED WHETHER LOWER URINARY TRACT SYMPTOMS PRESENT: ICD-10-CM

## 2022-11-30 DIAGNOSIS — M19.90 ARTHRITIS: ICD-10-CM

## 2022-11-30 DIAGNOSIS — I10 HYPERTENSION, UNSPECIFIED TYPE: Primary | ICD-10-CM

## 2022-11-30 DIAGNOSIS — E11.65 UNCONTROLLED TYPE 2 DIABETES MELLITUS WITH HYPERGLYCEMIA: ICD-10-CM

## 2022-11-30 DIAGNOSIS — F41.8 DEPRESSION WITH ANXIETY: ICD-10-CM

## 2022-11-30 DIAGNOSIS — E55.9 VITAMIN D DEFICIENCY, UNSPECIFIED: ICD-10-CM

## 2022-11-30 DIAGNOSIS — J45.20 MILD INTERMITTENT ASTHMA WITHOUT COMPLICATION: ICD-10-CM

## 2022-11-30 DIAGNOSIS — G47.33 OBSTRUCTIVE SLEEP APNEA: ICD-10-CM

## 2022-11-30 DIAGNOSIS — K80.20 CALCULUS OF GALLBLADDER WITHOUT CHOLECYSTITIS WITHOUT OBSTRUCTION: ICD-10-CM

## 2022-11-30 DIAGNOSIS — M51.36 BULGING OF LUMBAR INTERVERTEBRAL DISC: ICD-10-CM

## 2022-11-30 DIAGNOSIS — R73.9 HYPERGLYCEMIA: ICD-10-CM

## 2022-11-30 DIAGNOSIS — R74.8 ELEVATED CPK: ICD-10-CM

## 2022-11-30 DIAGNOSIS — M50.120 CERVICAL DISC DISORDER WITH RADICULOPATHY OF MID-CERVICAL REGION: ICD-10-CM

## 2022-11-30 DIAGNOSIS — E53.8 B12 DEFICIENCY: ICD-10-CM

## 2022-11-30 DIAGNOSIS — J44.9 COPD, MILD: ICD-10-CM

## 2022-11-30 LAB
EXPIRATION DATE: NORMAL
HBA1C MFR BLD: 14.9 %
Lab: NORMAL

## 2022-11-30 PROCEDURE — 83036 HEMOGLOBIN GLYCOSYLATED A1C: CPT | Performed by: PHYSICIAN ASSISTANT

## 2022-11-30 PROCEDURE — 3046F HEMOGLOBIN A1C LEVEL >9.0%: CPT | Performed by: PHYSICIAN ASSISTANT

## 2022-11-30 PROCEDURE — 99214 OFFICE O/P EST MOD 30 MIN: CPT | Performed by: PHYSICIAN ASSISTANT

## 2022-12-01 ENCOUNTER — TELEPHONE (OUTPATIENT)
Dept: FAMILY MEDICINE CLINIC | Facility: CLINIC | Age: 57
End: 2022-12-01

## 2022-12-01 RX ORDER — BUDESONIDE AND FORMOTEROL FUMARATE DIHYDRATE 160; 4.5 UG/1; UG/1
2 AEROSOL RESPIRATORY (INHALATION)
Qty: 10.2 G | Refills: 0 | Status: SHIPPED | OUTPATIENT
Start: 2022-12-01

## 2022-12-01 RX ORDER — LOSARTAN POTASSIUM 50 MG/1
50 TABLET ORAL DAILY
Qty: 30 TABLET | Refills: 0 | Status: SHIPPED | OUTPATIENT
Start: 2022-12-01 | End: 2022-12-28 | Stop reason: SDUPTHER

## 2022-12-01 RX ORDER — BLOOD-GLUCOSE METER
EACH MISCELLANEOUS
Qty: 1 EACH | Refills: 0 | Status: SHIPPED | OUTPATIENT
Start: 2022-12-01

## 2022-12-01 RX ORDER — ALBUTEROL SULFATE 90 UG/1
2 AEROSOL, METERED RESPIRATORY (INHALATION) EVERY 6 HOURS PRN
Qty: 18 G | Refills: 0 | Status: SHIPPED | OUTPATIENT
Start: 2022-12-01

## 2022-12-01 RX ORDER — FUROSEMIDE 20 MG/1
20 TABLET ORAL DAILY
Qty: 30 TABLET | Refills: 0 | Status: SHIPPED | OUTPATIENT
Start: 2022-12-01 | End: 2022-12-28 | Stop reason: SDUPTHER

## 2022-12-01 RX ORDER — ALBUTEROL SULFATE 1.25 MG/3ML
1 SOLUTION RESPIRATORY (INHALATION) EVERY 6 HOURS PRN
Qty: 120 EACH | Refills: 0 | Status: SHIPPED | OUTPATIENT
Start: 2022-12-01

## 2022-12-01 RX ORDER — ERTUGLIFLOZIN AND METFORMIN HYDROCHLORIDE 7.5; 1 MG/1; MG/1
1 TABLET, FILM COATED ORAL 2 TIMES DAILY WITH MEALS
Qty: 60 TABLET | Refills: 1 | Status: SHIPPED | OUTPATIENT
Start: 2022-12-01 | End: 2023-03-31

## 2022-12-01 RX ORDER — MONTELUKAST SODIUM 10 MG/1
10 TABLET ORAL
Qty: 90 TABLET | Refills: 0 | Status: SHIPPED | OUTPATIENT
Start: 2022-12-01

## 2022-12-01 RX ORDER — INSULIN DETEMIR 100 [IU]/ML
INJECTION, SOLUTION SUBCUTANEOUS
Qty: 5 ML | Refills: 0 | Status: SHIPPED | OUTPATIENT
Start: 2022-12-01 | End: 2022-12-19

## 2022-12-01 RX ORDER — DULAGLUTIDE 1.5 MG/.5ML
0.75 INJECTION, SOLUTION SUBCUTANEOUS WEEKLY
Qty: 1 ML | Refills: 0 | Status: SHIPPED | OUTPATIENT
Start: 2022-12-01 | End: 2023-01-18

## 2022-12-01 RX ORDER — SYRING-NEEDL,DISP,INSUL,0.3 ML 30 GX5/16"
SYRINGE, EMPTY DISPOSABLE MISCELLANEOUS
Qty: 100 EACH | Refills: 0 | Status: SHIPPED | OUTPATIENT
Start: 2022-12-01

## 2022-12-01 RX ORDER — ATORVASTATIN CALCIUM 40 MG/1
40 TABLET, FILM COATED ORAL NIGHTLY
Qty: 90 TABLET | Refills: 0 | Status: SHIPPED | OUTPATIENT
Start: 2022-12-01 | End: 2022-12-28 | Stop reason: SDUPTHER

## 2022-12-01 RX ORDER — POTASSIUM CHLORIDE 750 MG/1
10 TABLET, FILM COATED, EXTENDED RELEASE ORAL DAILY
Qty: 90 TABLET | Refills: 0 | Status: SHIPPED | OUTPATIENT
Start: 2022-12-01 | End: 2022-12-28 | Stop reason: SDUPTHER

## 2022-12-01 NOTE — TELEPHONE ENCOUNTER
Caller: Azael Carmen    Relationship to patient: Self    Best call back number: [phone]    Patient is needing: PATIENT IS CALLING TO STATE HE SAW MS LUDWIG YESTERDAY.  HE STATES HE THOUGHT SHE WAS GOING TO SEND IN SOME PRESCRIPTIONS, BUT THERE ARE NONE AT HIS PHARMACY.    [facility] [address] - [phone]  - [phone]   [phone]    PLEASE ADVISE.

## 2022-12-01 NOTE — TELEPHONE ENCOUNTER
Patient needs to check glucose ASAP- to ER if still > 400. Otherwise, if < 400, he should take medications as directed on patient instructions. I sent glucometer to pharmacy. If he has trouble getting glucometer, he should let us know.

## 2022-12-02 ENCOUNTER — CLINICAL SUPPORT (OUTPATIENT)
Dept: FAMILY MEDICINE CLINIC | Facility: CLINIC | Age: 57
End: 2022-12-02

## 2022-12-02 DIAGNOSIS — E11.65 UNCONTROLLED TYPE 2 DIABETES MELLITUS WITH HYPERGLYCEMIA: Primary | ICD-10-CM

## 2022-12-02 LAB — GLUCOSE BLDC GLUCOMTR-MCNC: 373 MG/DL (ref 70–130)

## 2022-12-02 PROCEDURE — 82962 GLUCOSE BLOOD TEST: CPT | Performed by: PHYSICIAN ASSISTANT

## 2022-12-02 NOTE — TELEPHONE ENCOUNTER
Pt did not get a meter yet.     He is come in the office now to get a glucose on the nurse schedule NOW.      Grisel

## 2022-12-04 LAB
25(OH)D3+25(OH)D2 SERPL-MCNC: 14.3 NG/ML (ref 30–100)
ALBUMIN SERPL-MCNC: 4.4 G/DL (ref 3.8–4.9)
ALBUMIN/CREAT UR: 12 MG/G CREAT (ref 0–29)
ALBUMIN/GLOB SERPL: 1.5 {RATIO} (ref 1.2–2.2)
ALP SERPL-CCNC: 107 IU/L (ref 44–121)
ALT SERPL-CCNC: 13 IU/L (ref 0–44)
APPEARANCE UR: CLEAR
AST SERPL-CCNC: 11 IU/L (ref 0–40)
BACTERIA #/AREA URNS HPF: NORMAL /[HPF]
BASOPHILS # BLD AUTO: 0.1 X10E3/UL (ref 0–0.2)
BASOPHILS NFR BLD AUTO: 1 %
BILIRUB SERPL-MCNC: 0.7 MG/DL (ref 0–1.2)
BILIRUB UR QL STRIP: NEGATIVE
BUN SERPL-MCNC: 12 MG/DL (ref 6–24)
BUN/CREAT SERPL: 13 (ref 9–20)
CALCIUM SERPL-MCNC: 9.7 MG/DL (ref 8.7–10.2)
CASTS URNS QL MICRO: NORMAL /LPF
CHLORIDE SERPL-SCNC: 96 MMOL/L (ref 96–106)
CHOLEST SERPL-MCNC: 303 MG/DL (ref 100–199)
CK SERPL-CCNC: 54 U/L (ref 41–331)
CO2 SERPL-SCNC: 21 MMOL/L (ref 20–29)
COLOR UR: YELLOW
CREAT SERPL-MCNC: 0.92 MG/DL (ref 0.76–1.27)
CREAT UR-MCNC: 30.6 MG/DL
EGFRCR SERPLBLD CKD-EPI 2021: 96 ML/MIN/1.73
EOSINOPHIL # BLD AUTO: 0.1 X10E3/UL (ref 0–0.4)
EOSINOPHIL NFR BLD AUTO: 1 %
EPI CELLS #/AREA URNS HPF: NORMAL /HPF (ref 0–10)
ERYTHROCYTE [DISTWIDTH] IN BLOOD BY AUTOMATED COUNT: 13.4 % (ref 11.6–15.4)
FOLATE SERPL-MCNC: 8.8 NG/ML
FSH SERPL-ACNC: 2.4 MIU/ML (ref 1.5–12.4)
GLOBULIN SER CALC-MCNC: 2.9 G/DL (ref 1.5–4.5)
GLUCOSE SERPL-MCNC: 516 MG/DL (ref 70–99)
GLUCOSE UR QL STRIP: ABNORMAL
HCT VFR BLD AUTO: 51.5 % (ref 37.5–51)
HDLC SERPL-MCNC: 47 MG/DL
HGB BLD-MCNC: 16.8 G/DL (ref 13–17.7)
HGB UR QL STRIP: NEGATIVE
IMM GRANULOCYTES # BLD AUTO: 0 X10E3/UL (ref 0–0.1)
IMM GRANULOCYTES NFR BLD AUTO: 0 %
KETONES UR QL STRIP: ABNORMAL
LDLC SERPL CALC-MCNC: 166 MG/DL (ref 0–99)
LDLC/HDLC SERPL: 3.5 RATIO (ref 0–3.6)
LEUKOCYTE ESTERASE UR QL STRIP: NEGATIVE
LH SERPL-ACNC: 6 MIU/ML (ref 1.7–8.6)
LYMPHOCYTES # BLD AUTO: 2.3 X10E3/UL (ref 0.7–3.1)
LYMPHOCYTES NFR BLD AUTO: 32 %
MCH RBC QN AUTO: 28.6 PG (ref 26.6–33)
MCHC RBC AUTO-ENTMCNC: 32.6 G/DL (ref 31.5–35.7)
MCV RBC AUTO: 88 FL (ref 79–97)
MICRO URNS: ABNORMAL
MICRO URNS: ABNORMAL
MICROALBUMIN UR-MCNC: 3.8 UG/ML
MONOCYTES # BLD AUTO: 0.4 X10E3/UL (ref 0.1–0.9)
MONOCYTES NFR BLD AUTO: 5 %
NEUTROPHILS # BLD AUTO: 4.4 X10E3/UL (ref 1.4–7)
NEUTROPHILS NFR BLD AUTO: 61 %
NITRITE UR QL STRIP: NEGATIVE
PH UR STRIP: 5.5 [PH] (ref 5–7.5)
PLATELET # BLD AUTO: 364 X10E3/UL (ref 150–450)
POTASSIUM SERPL-SCNC: 5.2 MMOL/L (ref 3.5–5.2)
PROT SERPL-MCNC: 7.3 G/DL (ref 6–8.5)
PROT UR QL STRIP: NEGATIVE
RBC # BLD AUTO: 5.88 X10E6/UL (ref 4.14–5.8)
RBC #/AREA URNS HPF: NORMAL /HPF (ref 0–2)
SODIUM SERPL-SCNC: 134 MMOL/L (ref 134–144)
SP GR UR STRIP: >=1.03 (ref 1–1.03)
T3FREE SERPL-MCNC: 2.8 PG/ML (ref 2–4.4)
T4 FREE SERPL-MCNC: 1.33 NG/DL (ref 0.82–1.77)
TESTOST FREE SERPL-MCNC: 10.7 PG/ML (ref 7.2–24)
TESTOST SERPL-MCNC: 290 NG/DL (ref 264–916)
TRIGL SERPL-MCNC: 463 MG/DL (ref 0–149)
TSH SERPL DL<=0.005 MIU/L-ACNC: 1.35 UIU/ML (ref 0.45–4.5)
URATE SERPL-MCNC: 4.1 MG/DL (ref 3.8–8.4)
URINALYSIS REFLEX: ABNORMAL
UROBILINOGEN UR STRIP-MCNC: 0.2 MG/DL (ref 0.2–1)
VIT B12 SERPL-MCNC: 363 PG/ML (ref 232–1245)
VLDLC SERPL CALC-MCNC: 90 MG/DL (ref 5–40)
WBC # BLD AUTO: 7.3 X10E3/UL (ref 3.4–10.8)
WBC #/AREA URNS HPF: NORMAL /HPF (ref 0–5)

## 2022-12-05 ENCOUNTER — TELEPHONE (OUTPATIENT)
Dept: FAMILY MEDICINE CLINIC | Facility: CLINIC | Age: 57
End: 2022-12-05

## 2022-12-05 NOTE — TELEPHONE ENCOUNTER
I have started a PA for Trulicty      His insurance is asking this:  Has the patient had at least a 3-month trial and failure, intolerance, or contraindication to one preferred agent [Bydureon Pen, Byetta, Ozempic, Victoza]?  I do not see he has tried any of these yet.       Grisel

## 2022-12-05 NOTE — TELEPHONE ENCOUNTER
Caller: Azael Carmen    Relationship to patient: Self    Best call back number: [phone]     Patient is needing:PATIENT IS CALLING TO STATE HE IS RETURNING A CALL FROM ELHAM TRUONG  UNABLE TO WARM TRANSFER.      PATIENT ALSO STATES THE FOLLOWING MEDICATIONS ARE NOT COVERED BY HIS INSURANCE.  HE IS WANTING TO KNOW IF HE CAN GET A REPLACEMENT MEDICATION THAT HIS INSURANCE WILL COVER.     Segluromet 7.5-1000 MG tablet      Trulicity 1.5 MG/0.5ML solution pen-injector     [facility] [address] - [phone] [facility] [phone]   [phone]    PLEASE ADVISE.

## 2022-12-06 NOTE — TELEPHONE ENCOUNTER
He was previously seeing endocrinology.  You will probably have to check with the patient regarding this.

## 2022-12-08 ENCOUNTER — PRIOR AUTHORIZATION (OUTPATIENT)
Dept: FAMILY MEDICINE CLINIC | Facility: CLINIC | Age: 57
End: 2022-12-08

## 2022-12-08 ENCOUNTER — TELEPHONE (OUTPATIENT)
Dept: FAMILY MEDICINE CLINIC | Facility: CLINIC | Age: 57
End: 2022-12-08

## 2022-12-08 DIAGNOSIS — E11.65 UNCONTROLLED TYPE 2 DIABETES MELLITUS WITH HYPERGLYCEMIA: ICD-10-CM

## 2022-12-08 DIAGNOSIS — R73.9 HYPERGLYCEMIA: ICD-10-CM

## 2022-12-08 RX ORDER — PEN NEEDLE, DIABETIC 31 G X1/4"
NEEDLE, DISPOSABLE MISCELLANEOUS
Qty: 60 EACH | Refills: 4 | Status: SHIPPED | OUTPATIENT
Start: 2022-12-08 | End: 2023-03-21

## 2022-12-08 NOTE — TELEPHONE ENCOUNTER
The request has been approved. The authorization is effective for a maximum of 12 fills from 12/08/2022 to 12/07/2023, as long as the member is enrolled in their current health plan. The request was approved with a quantity restriction. This has been approved for a max daily dosage of 2. A written notification letter will follow with additional details.    Grisel

## 2022-12-08 NOTE — TELEPHONE ENCOUNTER
Patient states that he has not been checking since he didn't have any insulin. I told him to keep a log of when he checks it now since you sent in his pen needles and to call us and let us know the readings.

## 2022-12-08 NOTE — TELEPHONE ENCOUNTER
Patient called and asked if you could sent in his Flomax. He hasn't seen a Urologist in years. He would be okay if you referred him to one but asking if you could fill this in the mean time.       [facility]  *Pt notified this request may take a few days to address.      Grisel

## 2022-12-08 NOTE — TELEPHONE ENCOUNTER
[facility]     Pharmacy representative name: ERIBERTO    Pharmacy representative phone number: [phone]    What medication are you calling in regards to: Insulin Pen Needle     What question does the pharmacy have: PHARMACY RECEIVED A PRESCRIPTION FOR THE FLEX PENS BUT NOT FOR THE NEEDLES THAT GO ON TOP. PHARMACY IS REQUESTING THE PRESCRIPTION FOR THE NEEDLES BE SENT OVER ASAP.    Who is the provider that prescribed the medication: ELHAM LUDWIG

## 2022-12-08 NOTE — TELEPHONE ENCOUNTER
These have been sent. I was unaware he had no way to take medication. Please let me know how his glucose is running.

## 2022-12-09 ENCOUNTER — OFFICE VISIT (OUTPATIENT)
Dept: SLEEP MEDICINE | Facility: HOSPITAL | Age: 57
End: 2022-12-09

## 2022-12-09 VITALS
HEIGHT: 70 IN | DIASTOLIC BLOOD PRESSURE: 81 MMHG | BODY MASS INDEX: 33.82 KG/M2 | WEIGHT: 236.2 LBS | OXYGEN SATURATION: 97 % | HEART RATE: 100 BPM | SYSTOLIC BLOOD PRESSURE: 140 MMHG

## 2022-12-09 DIAGNOSIS — E66.9 OBESITY (BMI 30-39.9): Primary | ICD-10-CM

## 2022-12-09 DIAGNOSIS — J44.9 COPD, MILD: ICD-10-CM

## 2022-12-09 DIAGNOSIS — G47.33 OBSTRUCTIVE SLEEP APNEA: ICD-10-CM

## 2022-12-09 PROCEDURE — G0463 HOSPITAL OUTPT CLINIC VISIT: HCPCS

## 2022-12-09 RX ORDER — TAMSULOSIN HYDROCHLORIDE 0.4 MG/1
1 CAPSULE ORAL DAILY
Qty: 30 CAPSULE | Refills: 1 | Status: SHIPPED | OUTPATIENT
Start: 2022-12-09 | End: 2023-02-08

## 2022-12-09 NOTE — TELEPHONE ENCOUNTER
He does have his glucometer, right.  He should be checking this with such elevated levels regardless of insulin or not.

## 2022-12-09 NOTE — PROGRESS NOTES
[facility]  Telephone: [phone] / Fax: [phone] [place]  Telephone: [phone] / Fax: [phone] Karen De Jesus    Referring Physician: Ibis Lacey PA  PCP: Ibis Lacey PA    Reason for consult:  sleep apnea    Azael Carmen is a 57 y.o.male  was seen in the Sleep Disorders Center today for evaluation of sleep apnea.  He was diagnosed with IRINA in late 20s and is currently on his 3rd machine. His latest sleep study was done at [facility], roughly 4 years ago, he thinks. We requested the report. He is primarily here to obtain order for supplies renewal.  He goes to bed 7pm and is up at 9am. He uses the CPAP machine every night. His current machine is over 6 years old. It is DreamStation Auto CPAP, recalled unit.  Machine is set at pressure of 16cm H2O. There is large air leak of 5 hours and 16 minutes on last 30 d download. Residual AHI is 14, likely due to leaks. He is eligible for a new machine.    SH- no alcohol, 3 caffeine    ROS negative    Azael Carmen  has a past medical history of Allergic rhinitis, Anxiety, Arthritis, Asthma, Carpal tunnel syndrome, Chest pain in adult, Chronic back pain, COPD (chronic obstructive pulmonary disease) ([facility]), Coronary artery disease, Depression, Diabetes mellitus ([facility]), Enlarged prostate, Erectile dysfunction, Heart murmur, Heartburn, History of MI (myocardial infarction), History of MRSA infection, History of substance abuse ([facility]), Hypertension, Lightheadedness, Mixed hyperlipidemia (7/19/2016), Neuropathy, Psoriasis, Sleep apnea, and Swelling of lower extremity.    Current Medications:    Current Outpatient Medications:   •  tamsulosin (FLOMAX) 0.4 MG capsule 24 hr capsule, Take 1 capsule by mouth Daily., Disp: 30 capsule, Rfl: 1  •  albuterol (ACCUNEB) 1.25 MG/3ML nebulizer solution, Take 3 mL by nebulization Every 6 (Six) Hours As Needed for Wheezing., Disp: 120 each, Rfl: 0  •  albuterol sulfate HFA (Ventolin HFA)  108 (90 Base) MCG/ACT inhaler, Inhale 2 puffs Every 6 (Six) Hours As Needed for Wheezing., Disp: 18 g, Rfl: 0  •  ammonium lactate (AMLACTIN) 12 % cream, Apply  topically to the appropriate area as directed As Needed for Dry Skin., Disp: , Rfl:   •  atorvastatin (LIPITOR) 40 MG tablet, Take 1 tablet by mouth Every Night., Disp: 90 tablet, Rfl: 0  •  Blood Glucose Monitoring Suppl (Assure Platinum Meter) device, Check glucose 3-4 x daily for uncontrolled blood glucose, Disp: 1 each, Rfl: 0  •  budesonide-formoterol (Symbicort) 160-4.5 MCG/ACT inhaler, Inhale 2 puffs 2 (Two) Times a Day. Then rinse mouth, Disp: 10.2 g, Rfl: 0  •  certavite/antioxidants tablet tablet, TAKE 1 TABLET BY MOUTH EVERY DAY, Disp: 90 tablet, Rfl: 0  •  furosemide (LASIX) 20 MG tablet, Take 1 tablet by mouth Daily., Disp: 30 tablet, Rfl: 0  •  glucose blood (Assure Platinum) test strip, Use as instructed, Disp: 100 each, Rfl: 0  •  hydrOXYzine (ATARAX) 50 MG tablet, Take 1 tablet by mouth Every Night., Disp: 30 tablet, Rfl: 0  •  insulin detemir (Levemir FlexTouch) 100 UNIT/ML injection, Inject 10 units SC daily. Increase by 3 units every 5 days for goal glucose , Disp: 5 mL, Rfl: 0  •  Insulin Pen Needle (Pen Needles) 31G X 6 MM misc, One inj daily, Disp: 60 each, Rfl: 4  •  Lancet Device misc, Test glucose 3-4 x daily, Disp: 100 each, Rfl: 0  •  losartan (COZAAR) 50 MG tablet, Take 1 tablet by mouth Daily., Disp: 30 tablet, Rfl: 0  •  montelukast (SINGULAIR) 10 MG tablet, Take 1 tablet by mouth every night at bedtime., Disp: 90 tablet, Rfl: 0  •  potassium chloride 10 MEQ CR tablet, Take 1 tablet by mouth Daily., Disp: 90 tablet, Rfl: 0  •  Segluromet 7.5-1000 MG tablet, Take 1 tablet by mouth 2 (Two) Times a Day With Meals., Disp: 60 tablet, Rfl: 1  •  sertraline (ZOLOFT) 100 MG tablet, Take 100 mg by mouth Every Night., Disp: , Rfl:   •  SM Aspirin Adult Low Strength 81 MG EC tablet, TAKE 1 TABLET BY MOUTH EVERY DAY, Disp: 90 tablet,  "Rfl: 0  •  tadalafil (Cialis) 5 MG tablet, Take 1 tablet by mouth Daily As Needed for Erectile Dysfunction., Disp: 90 tablet, Rfl: 1  •  Triamcinolone Acetonide (NASACORT) 55 MCG/ACT nasal inhaler, INHALE 2 SPRAYS INTO EACH NOSTRIL EVERY DAY, Disp: 16.9 mL, Rfl: 0  •  Trulicity 1.5 MG/0.5ML solution pen-injector, Inject 0.75 mg under the skin into the appropriate area as directed 1 (One) Time Per Week., Disp: 1 mL, Rfl: 0  •  vitamin B-12 (CYANOCOBALAMIN) 1000 MCG tablet, Take 1 tablet by mouth Daily., Disp: 90 tablet, Rfl: 0  •  vitamin D (ERGOCALCIFEROL) 1.25 MG (58627 UT) capsule capsule, TAKE ONE CAPSULE BY MOUTH twice WEEKLY, Disp: 26 capsule, Rfl: 0    I have reviewed Past Medical History, Past Surgical History, Medication List, Social History and Family History as entered in Sleep Questionnaire and EPIC.    ESS  4   Vital Signs /81   Pulse 100   Ht 177.8 cm (70\")   Wt 107 kg (236 lb 3.2 oz)   SpO2 97%   BMI 33.89 kg/m²  Body mass index is 33.89 kg/m².    General Alert and oriented. No acute distress noted   Pharynx/Throat Class IV  Mallampati airway, large tongue, no evidence of redundant lateral pharyngeal tissue. No oral lesions. No thrush. Moist mucous membranes.   Head Normocephalic. Symmetrical. Atraumatic.    Nose No septal deviation. No drainage   Chest Wall Normal shape. Symmetric expansion with respiration. No tenderness.   Neck Trachea midline, no thyromegaly or adenopathy    Lungs Clear to auscultation bilaterally. No wheezes. No rhonchi. No rales. Respirations regular, even and unlabored.   Heart Regular rhythm and normal rate. Normal S1 and S2. No murmur   Abdomen Soft, non-tender and non-distended. Normal bowel sounds. No masses.   Extremities Moves all extremities well. No edema   Psychiatric Normal mood and affect.        Impression:  1. Obesity (BMI 30-39.9)    2. Obstructive sleep apnea    3. COPD, mild (HCC)          Plan:  Get copy of old sleep study from [facility]  Order " new machine, auto CPAP 12-18cm H2O  Establish pt with new DME for machine +supplies  Current mask started to leak and requires replacement  I reviewed recent download report with patient.  We discussed IRINA pathophysiology.    If original sleep study is too old to use for a new machine, he will need to have an updated study    Addendum  Copy of sleep study done at [facility] 6/26/16 was received  AHI was 25/hr, RDI was 44/hr, REM RDI was 27.1,supine RDI 55/hr.  Appropriate correction of AHI was achieved on 16cm H2O pressure.    I appreciate the opportunity to participate in this patient's care.      KATERINA Hutchison  [facility]  Phone: [phone]      Part of this note may be an electronic transcription/translation of spoken language to printed text using the Dragon Dictation System. Some errors may exist even though the document was edited.

## 2022-12-12 ENCOUNTER — TELEPHONE (OUTPATIENT)
Dept: SLEEP MEDICINE | Facility: HOSPITAL | Age: 57
End: 2022-12-12

## 2022-12-16 NOTE — PROGRESS NOTES
Subjective   Azael Carmen is a 57 y.o. male.     History of Present Illness  F/u from dr Oliva for ED, hypogonadism      Patient is a 57-year-old male who came in for follow-up.  He was released from incarceration about 2 weeks ago and switch back to his previous diabetes medications.  He was on NPH 25 units every morning during his incarceration.    Patient has known diabetes mellitus since 2006 and was started on insulin in 2018.  He started back on Levemir 20 units every morning, Trulicity 1.5 mg weekly, Segluromet 7.5/1000 mg 1 tablet twice a day about 2 weeks ago.  He checks his blood sugar once a day and fasting glucose are in the 300s.  He denies any hypoglycemic episode.  He has gained 13 pounds since February 2021.  Hemoglobin A1c done in November 2022 is 14.9%.    His last eye examination was several years ago.  He denies retinopathy.  He denies numbness, tingling or burning in his hands or feet.  He denies nephropathy.  Urine microalbumin was normal in November 2022.    He has hyperlipidemia and is on atorvastatin 40 mg/day.  He may not be taking atorvastatin during his period of incarceration.  He denies myalgia.  Lipid panel done in November 2022 are as follows: Cholesterol 303.  HDL 47.  .  Triglycerides 163.    He has hypertension and coronary artery disease.  He had a previous angioplasty with stent around 2017.  He has no history of stroke.  He denies chest pain.  He is on losartan 50 mg once a day and Lasix 20 mg/day.  He has been following with Dr. Rucker.    He has a history of enlarged prostate and is on Flomax 0.4 mg daily.  He has intermittent urinary dribbling and retention.  He has nocturia 4-5 times a night.    He has sleep apnea and restarted back on CPAP.  He wakes up rested.    He has COPD and is on Ventolin and Symbicort.  He smoked 1 pack/day for at least 40 years and continues to smoke.  [facility]      The following portions of the patient's  "history were reviewed and updated as appropriate: allergies, current medications, past family history, past medical history, past social history, past surgical history and problem list.    Review of Systems   Eyes: Negative for visual disturbance.   Respiratory: Negative for shortness of breath.    Cardiovascular: Negative for chest pain and palpitations.   Gastrointestinal: Negative.    Endocrine: Negative for polyuria.   Genitourinary: Positive for frequency. Negative for difficulty urinating.   Musculoskeletal: Negative for myalgias.   Neurological: Negative for numbness.     Vitals:    12/19/22 1353   BP: 140/90   Temp: 96.6 °F (35.9 °C)   TempSrc: Temporal   Weight: 109 kg (240 lb 12.8 oz)   Height: 177.8 cm (70\")      Objective   Physical Exam  Constitutional:       Appearance: Normal appearance. He is obese. He is not toxic-appearing or diaphoretic.   Eyes:      General: No scleral icterus.        Right eye: No discharge.         Left eye: No discharge.      Extraocular Movements: Extraocular movements intact.      Conjunctiva/sclera: Conjunctivae normal.   Neck:      Vascular: No carotid bruit.   Cardiovascular:      Rate and Rhythm: Normal rate and regular rhythm.      Pulses: Normal pulses.      Heart sounds: Normal heart sounds.   Pulmonary:      Effort: Pulmonary effort is normal. No respiratory distress.      Breath sounds: Normal breath sounds. No stridor. No rales.   Chest:      Chest wall: No tenderness.   Abdominal:      General: Bowel sounds are normal.      Palpations: Abdomen is soft.      Tenderness: There is no right CVA tenderness or left CVA tenderness.   Musculoskeletal:         General: No swelling or tenderness.   Lymphadenopathy:      Cervical: No cervical adenopathy.   Skin:     General: Skin is warm and dry.   Neurological:      Mental Status: He is alert and oriented to person, place, and time.      Comments: Intact light touch in lower extremities.       Clinical Support on 12/02/2022 "   Component Date Value Ref Range Status   • Glucose 12/02/2022 373 (A)  70 - 130 mg/dL Final     Assessment & Plan   Diagnoses and all orders for this visit:    1. Type 2 diabetes mellitus without complication, with long-term current use of insulin (HCC) (Primary)    2. Obstructive sleep apnea    3. Mixed hyperlipidemia    4. Coronary artery disease involving native coronary artery of native heart without angina pectoris    5. COPD, mild ([facility])    6. Vitamin D deficiency    7. Primary hypertension    8. Uncontrolled type 2 diabetes mellitus with hyperglycemia ([facility])  -     insulin detemir (Levemir FlexTouch) 100 UNIT/ML injection; 30 units daily  Dispense: 5 mL; Refill: 0      Increase Levemir to 30 units every morning.  Continue Trulicity 1.5 mg weekly and Segluromet 7.5/1000 mg twice daily.  Check blood sugars at least twice a day and keep a record.  Bring records during the follow-up.  Advised to have an eye exam in the near future after blood sugar stabilizes.    Continue atorvastatin 40 mg/day.    Continue CPAP.  Continue Ventolin and Symbicort.  Follow-up with pulmonologist.    Continue losartan and Lasix.  Will defer blood pressure control to PCP and Dr. Rucker.    Copy of my note sent to CAROLINA Tiwari and Dr. Rucker.    RTC 4-6 weeks with CHRISTOPHE Bales NP.

## 2022-12-19 ENCOUNTER — OFFICE VISIT (OUTPATIENT)
Dept: ENDOCRINOLOGY | Age: 57
End: 2022-12-19

## 2022-12-19 VITALS
WEIGHT: 240.8 LBS | HEIGHT: 70 IN | SYSTOLIC BLOOD PRESSURE: 140 MMHG | DIASTOLIC BLOOD PRESSURE: 90 MMHG | TEMPERATURE: 96.6 F | BODY MASS INDEX: 34.47 KG/M2

## 2022-12-19 DIAGNOSIS — I10 PRIMARY HYPERTENSION: ICD-10-CM

## 2022-12-19 DIAGNOSIS — G47.33 OBSTRUCTIVE SLEEP APNEA: ICD-10-CM

## 2022-12-19 DIAGNOSIS — J44.9 COPD, MILD: ICD-10-CM

## 2022-12-19 DIAGNOSIS — E55.9 VITAMIN D DEFICIENCY: ICD-10-CM

## 2022-12-19 DIAGNOSIS — Z79.4 TYPE 2 DIABETES MELLITUS WITHOUT COMPLICATION, WITH LONG-TERM CURRENT USE OF INSULIN: Primary | ICD-10-CM

## 2022-12-19 DIAGNOSIS — E11.9 TYPE 2 DIABETES MELLITUS WITHOUT COMPLICATION, WITH LONG-TERM CURRENT USE OF INSULIN: Primary | ICD-10-CM

## 2022-12-19 DIAGNOSIS — E78.2 MIXED HYPERLIPIDEMIA: ICD-10-CM

## 2022-12-19 DIAGNOSIS — I25.10 CORONARY ARTERY DISEASE INVOLVING NATIVE CORONARY ARTERY OF NATIVE HEART WITHOUT ANGINA PECTORIS: ICD-10-CM

## 2022-12-19 DIAGNOSIS — E11.65 UNCONTROLLED TYPE 2 DIABETES MELLITUS WITH HYPERGLYCEMIA: ICD-10-CM

## 2022-12-19 PROCEDURE — 99214 OFFICE O/P EST MOD 30 MIN: CPT | Performed by: INTERNAL MEDICINE

## 2022-12-19 RX ORDER — LANCETS 28 GAUGE
EACH MISCELLANEOUS
COMMUNITY
Start: 2022-12-03

## 2022-12-19 RX ORDER — POTASSIUM CHLORIDE 750 MG/1
TABLET, EXTENDED RELEASE ORAL
COMMUNITY
Start: 2022-12-01 | End: 2022-12-19 | Stop reason: SDUPTHER

## 2022-12-19 RX ORDER — INSULIN DETEMIR 100 [IU]/ML
INJECTION, SOLUTION SUBCUTANEOUS
Qty: 5 ML | Refills: 0 | Status: SHIPPED
Start: 2022-12-19 | End: 2023-01-20 | Stop reason: SDUPTHER

## 2022-12-20 ENCOUNTER — OFFICE VISIT (OUTPATIENT)
Dept: FAMILY MEDICINE CLINIC | Facility: CLINIC | Age: 57
End: 2022-12-20

## 2022-12-20 ENCOUNTER — PATIENT ROUNDING (BHMG ONLY) (OUTPATIENT)
Dept: ENDOCRINOLOGY | Age: 57
End: 2022-12-20

## 2022-12-20 VITALS
OXYGEN SATURATION: 96 % | SYSTOLIC BLOOD PRESSURE: 136 MMHG | BODY MASS INDEX: 34.07 KG/M2 | WEIGHT: 238 LBS | DIASTOLIC BLOOD PRESSURE: 82 MMHG | TEMPERATURE: 97.1 F | HEART RATE: 101 BPM | HEIGHT: 70 IN

## 2022-12-20 DIAGNOSIS — L02.92 BOIL: Primary | ICD-10-CM

## 2022-12-20 DIAGNOSIS — E53.8 B12 DEFICIENCY: Primary | ICD-10-CM

## 2022-12-20 DIAGNOSIS — E55.9 VITAMIN D DEFICIENCY, UNSPECIFIED: ICD-10-CM

## 2022-12-20 PROCEDURE — 99213 OFFICE O/P EST LOW 20 MIN: CPT | Performed by: NURSE PRACTITIONER

## 2022-12-20 RX ORDER — CHOLECALCIFEROL (VITAMIN D3) 125 MCG
500 CAPSULE ORAL DAILY
Qty: 90 TABLET | Refills: 0 | Status: SHIPPED | OUTPATIENT
Start: 2022-12-20 | End: 2023-03-06

## 2022-12-20 RX ORDER — ERGOCALCIFEROL 1.25 MG/1
50000 CAPSULE ORAL WEEKLY
Qty: 13 CAPSULE | Refills: 0 | Status: SHIPPED | OUTPATIENT
Start: 2022-12-20 | End: 2023-04-05

## 2022-12-20 RX ORDER — DOXYCYCLINE HYCLATE 100 MG/1
100 CAPSULE ORAL 2 TIMES DAILY
Qty: 20 CAPSULE | Refills: 0 | Status: SHIPPED | OUTPATIENT
Start: 2022-12-20 | End: 2022-12-30 | Stop reason: SDUPTHER

## 2022-12-20 NOTE — PROGRESS NOTES
"Chief Complaint  Mass (Patient is in today due to a bump under his arm right arm and butt crack. He states that he noticed these two bumps a few days ago. Patient states that they are very sore. )    Subjective        Azael Carmen presents to [facility]  History of Present Illness  This is a 58 yo male patient here today for evaluation on mass like area in right axillar area and coccyx. Patient reports she noticed both area on Sunday. Both areas are tender. No drainage reported. No reports of fever.      Golf ball size in arm pit    buttuck has opened with drainage hard area  Objective   Vital Signs:  /82   Pulse 101   Temp 97.1 °F (36.2 °C)   Ht 177.8 cm (70\")   Wt 108 kg (238 lb)   SpO2 96%   BMI 34.15 kg/m²   Estimated body mass index is 34.15 kg/m² as calculated from the following:    Height as of this encounter: 177.8 cm (70\").    Weight as of this encounter: 108 kg (238 lb).          Physical Exam  Vitals and nursing note reviewed.   Constitutional:       Appearance: Normal appearance.   Skin:     Comments: Golf ball size nodule noted in axilla area. It is red, tender to touch. There is no drainage noted.    Area in coccyx has drainage. Nontender   Neurological:      Mental Status: He is alert.        Result Review :{Labs  Result Review  Imaging  Med Tab  Media  Procedures  :23}                Assessment and Plan   Diagnoses and all orders for this visit:    1. Boil (Primary)    Other orders  -     doxycycline (VIBRAMYCIN) 100 MG capsule; Take 1 capsule by mouth 2 (Two) Times a Day.  Dispense: 20 capsule; Refill: 0              I will start on abx and have encouraged patient to use warm compresses to area.  He has appt with van on 12/30 will follow up then to make sure area is healing  He was instructed to go to [facility] if symptoms worsen  Follow Up   No follow-ups on file.  Patient was given instructions and counseling regarding his condition or for health " maintenance advice. Please see specific information pulled into the AVS if appropriate.

## 2022-12-27 NOTE — PROGRESS NOTES
Date of Office Visit: 2022  Encounter Provider: KATERINA Alex  Place of Service: [facility] CARDIOLOGY  Patient Name: Azael Carmen  :1965    No chief complaint on file.  : follow up CAD    HPI: Azael Carmen is a 57 y.o. male who is a patient of Dr. Rucker.  He is new to me today and presents for 1 year office follow-up.  He has a history of coronary artery disease, hypertension, hyperlipidemia, IRINA (CPAP compliant) and diabetes type 2.  He has history of prior PCI to RCA as well as chronic total occlusion of the circumflex in 2016.  A stress test in 2018 showed evidence of a lateral and inferolateral infarction with a small area of inferior wall daphney-infarct ischemia, normal LVEF.  Patient was not placed on beta-blocker at that time due to bradycardia.    On today's office visit, patient has no complaints of chest pain, pressure, lightheadedness or lower extremity edema.  He does have some chronic dyspnea on exertion secondary to his COPD and asthma.  Patient appears euvolemic.  He does have a slight systolic murmur.  Blood pressure is elevated at 166/94.  Patient notes that he just bought a blood pressure cuff.  He is also following an endocrinologist who recently started him on oral medication for diabetes.  PCP recently started patient on statin due to his lipid panel out of target range approximately 1 month ago (, total cholesterol 303 and triglycerides 463).  This was significantly worse than approximately 2 years ago.  Patient smokes about half a pack of cigarettes a day.  We had a lengthy discussion regarding cardiac risk factors and importance of keeping them under control.  Patient verbalized understanding.  He plans to monitor his blood pressure on a normal basis.  He does have a follow-up with his PCP to check his lipid panel again.  He endorses compliance with his medications. We discussed importance of exercise.  Patient has chronic  fatigue, however, he verbalized understanding that he should start to exercise and this may help with his fatigue, as well.      Previous testing and notes have been reviewed by me.   Past Medical History:   Diagnosis Date   • Allergic rhinitis    • Anxiety    • Arthritis    • Asthma    • Carpal tunnel syndrome    • Chest pain in adult    • Chronic back pain    • COPD (chronic obstructive pulmonary disease) ([facility])    • Coronary artery disease     PTCA 2016   • Depression    • Diabetes mellitus ([facility])    • Enlarged prostate    • Erectile dysfunction    • Heart murmur    • Heartburn    • History of MI (myocardial infarction)     ?? 2016   • History of MRSA infection     LEFT AXIALLRY MULITPLE TIMES   - ORAL ANTIBX ONLY (MOST RECENT 2018)   • History of substance abuse ([facility])    • Hypertension    • Lightheadedness    • Mixed hyperlipidemia 7/19/2016   • Neuropathy    • Psoriasis    • Sleep apnea     WEARS CPAP   • Swelling of lower extremity        Past Surgical History:   Procedure Laterality Date   • CARDIAC CATHETERIZATION N/A 9/6/2016    Procedure: Coronary angiography;  Surgeon: Blaise Burroughs MD;  Location: [facility] CATH INVASIVE LOCATION;  Service:    • CARDIAC CATHETERIZATION N/A 9/6/2016    Procedure: Left Heart Cath;  Surgeon: Blaise Burroughs MD;  Location: [facility] CATH INVASIVE LOCATION;  Service:    • CARDIAC CATHETERIZATION N/A 9/6/2016    Procedure: Left ventriculography;  Surgeon: Blaise Burroughs MD;  Location: [facility] CATH INVASIVE LOCATION;  Service:    • CARDIAC CATHETERIZATION N/A 10/7/2016    Procedure: Chronic Total Occlussion;  Surgeon: Blaise Burroughs MD;  Location: [facility] CATH INVASIVE LOCATION;  Service:    • CARPAL TUNNEL RELEASE Left 5/14/2019    Procedure: CARPAL TUNNEL RELEASE;  Surgeon: Bryan Flores MD;  Location: [facility] [place] [facility];  Service: Orthopedics   • COLONOSCOPY  unknown   • VASECTOMY         Social History     Socioeconomic History   • Marital status:     Tobacco Use   • Smoking status: Some Days     Packs/day: 0.50     Years: 40.00     Pack years: 20.00     Types: Cigarettes     Last attempt to quit: 4/10/2016     Years since quittin.7   • Smokeless tobacco: Never   Vaping Use   • Vaping Use: Never used   Substance and Sexual Activity   • Alcohol use: No     Comment: RECOVERING - NONE SINCE    • Drug use: No     Comment: RECOVERING ADDICT NONE SINCE    • Sexual activity: Defer       Family History   Problem Relation Age of Onset   • Hypertension Father    • Heart disease Father    • Heart attack Father    • Hypertension Brother    • Cancer Paternal Aunt    • Cancer Maternal Grandmother    • Cancer Maternal Grandfather    • Cancer Paternal Grandmother    • Cancer Paternal Grandfather    • Malig Hyperthermia Neg Hx        Review of Systems   Constitutional: Negative.   HENT: Negative.    Eyes: Negative.    Cardiovascular: Negative.    Respiratory: Negative.    Endocrine: Negative.    Hematologic/Lymphatic: Negative.    Skin: Negative.    Musculoskeletal: Negative.    Gastrointestinal: Negative.    Genitourinary: Negative.    Neurological: Negative.    Psychiatric/Behavioral: Negative.    Allergic/Immunologic: Negative.        Allergies   Allergen Reactions   • Cymbalta [Duloxetine Hcl] Hives   • Penicillins Rash         Current Outpatient Medications:   •  albuterol (ACCUNEB) 1.25 MG/3ML nebulizer solution, Take 3 mL by nebulization Every 6 (Six) Hours As Needed for Wheezing., Disp: 120 each, Rfl: 0  •  albuterol sulfate HFA (Ventolin HFA) 108 (90 Base) MCG/ACT inhaler, Inhale 2 puffs Every 6 (Six) Hours As Needed for Wheezing., Disp: 18 g, Rfl: 0  •  atorvastatin (LIPITOR) 40 MG tablet, Take 1 tablet by mouth Every Night., Disp: 90 tablet, Rfl: 3  •  Blood Glucose Monitoring Suppl (Assure Platinum Meter) device, Check glucose 3-4 x daily for uncontrolled blood glucose, Disp: 1 each, Rfl: 0  •  budesonide-formoterol (Symbicort) 160-4.5 MCG/ACT inhaler,  "Inhale 2 puffs 2 (Two) Times a Day. Then rinse mouth, Disp: 10.2 g, Rfl: 0  •  doxycycline (VIBRAMYCIN) 100 MG capsule, Take 1 capsule by mouth 2 (Two) Times a Day., Disp: 20 capsule, Rfl: 0  •  furosemide (LASIX) 20 MG tablet, Take 1 tablet by mouth Daily., Disp: 90 tablet, Rfl: 3  •  glucose blood (Assure Platinum) test strip, Use as instructed, Disp: 100 each, Rfl: 0  •  insulin detemir (Levemir FlexTouch) 100 UNIT/ML injection, 30 units daily, Disp: 5 mL, Rfl: 0  •  Insulin Pen Needle (Pen Needles) 31G X 6 MM misc, One inj daily, Disp: 60 each, Rfl: 4  •  Lancet Device misc, Test glucose 3-4 x daily, Disp: 100 each, Rfl: 0  •  Lancets (freestyle) lancets, , Disp: , Rfl:   •  losartan (COZAAR) 50 MG tablet, Take 1 tablet by mouth Daily., Disp: 90 tablet, Rfl: 3  •  montelukast (SINGULAIR) 10 MG tablet, Take 1 tablet by mouth every night at bedtime., Disp: 90 tablet, Rfl: 0  •  potassium chloride 10 MEQ CR tablet, Take 1 tablet by mouth Daily., Disp: 90 tablet, Rfl: 3  •  Segluromet 7.5-1000 MG tablet, Take 1 tablet by mouth 2 (Two) Times a Day With Meals., Disp: 60 tablet, Rfl: 1  •  tamsulosin (FLOMAX) 0.4 MG capsule 24 hr capsule, Take 1 capsule by mouth Daily., Disp: 30 capsule, Rfl: 1  •  Trulicity 1.5 MG/0.5ML solution pen-injector, Inject 0.75 mg under the skin into the appropriate area as directed 1 (One) Time Per Week., Disp: 1 mL, Rfl: 0  •  aspirin 81 MG EC tablet, Take 1 tablet by mouth Daily., Disp: 90 tablet, Rfl: 3  •  metoprolol succinate XL (TOPROL-XL) 25 MG 24 hr tablet, Take 1 tablet by mouth Daily., Disp: 90 tablet, Rfl: 3  •  vitamin B-12 (CYANOCOBALAMIN) 500 MCG tablet, Take 1 tablet by mouth Daily., Disp: 90 tablet, Rfl: 0  •  vitamin D (ERGOCALCIFEROL) 1.25 MG (41302 UT) capsule capsule, Take 1 capsule by mouth 1 (One) Time Per Week., Disp: 13 capsule, Rfl: 0      Objective:     Vitals:    12/28/22 1007   BP: 166/94   Pulse: 91   Weight: 106 kg (232 lb 13.9 oz)   Height: 177.8 cm (70\") "     Body mass index is 33.41 kg/m².     Lexiscan stress test 11/26/2018: ([facility])  No myocardial ischemia or significant arrhythmias.  No stress-induced ischemia.  LVEF 50 to 54%    Left heart cath 10/07/2016:  CORONARY ANGIOGRAM:  RIGHT CORONARY ARTERY:  The right coronary artery is a large vessel, a dominant vessel. In the mid area, there was an ulcerated 80% to 90% stenosis. We did not inject his left system.  His circumflex was 100% occluded.      CONCLUSION:  1.  Two-vessel coronary disease.   2.  PCI with bioabsorbable stent in the right coronary artery with good results.     Left heart cath 9/7/2016:   The LV systolic function is low normal ejection fraction approximately 50%.  The inferoposterior wall is hypokinetic.     The left main coronary artery is normal.     The left anterior descending coronary artery is a large-caliber vessel proximally.  At the mid vessel at the bifurcation of the diagonal branch it has a tubular area of plaque of approximately 30%.  This is not significant.  The the diagonal branch is medium caliber with no significant stenosis.     The left circumflex coronary artery is a medium caliber vessel a totally occluded at the mid vessel, there is a faint filling of the 2 distal marginal branches after this total occlusion.     The right coronary artery is a large dominant vessel.  The mid right coronary artery just before the origin of the RV marginal branch has an ulcerated plaque of approximately 70-80%.  The distal right coronary artery has some mildly diffuse disease of approximately 30% with no critical stenosis.  It bifurcates into the PDA and PLV branches  the standard fashion.  The PDA has diffuse disease.                                                                                                                                                    Complications: NOne     Impression:   Totally occluded circumflex coronary artery at the mid vessel with faint filling  of the distal marginal branches. The mid right coronary artery has 80% ulcerated plaque.     Will bring him back for a  will recanalization of the circumflex coronary artery at a later date, at that time and will evaluate the right coronary stenosis with FFR.    2D Echocardiogram 9/2/2016:  • All left ventricular wall segments contract normally.  • Mild mitral valve regurgitation is present  • Left ventricular function is normal. Estimated EF = 61%.  • Left ventricular diastolic dysfunction (grade I) consistent with impaired relaxation.  • Global L.strain of LV is normal at 19%      PHYSICAL EXAM:    Constitutional:       Appearance: Healthy appearance. Not in distress.   Neck:      Vascular: No JVR. JVD normal.   Pulmonary:      Effort: Pulmonary effort is normal.      Breath sounds: Normal breath sounds. No wheezing. No rhonchi. No rales.   Chest:      Chest wall: Not tender to palpatation.   Cardiovascular:      PMI at left midclavicular line. Normal rate. Regular rhythm. Normal S1. Normal S2.      Murmurs: There is a systolic murmur.      No gallop. No click. No rub.   Pulses:     Intact distal pulses.   Edema:     Peripheral edema absent.   Abdominal:      General: Bowel sounds are normal.      Palpations: Abdomen is soft.      Tenderness: There is no abdominal tenderness.   Musculoskeletal: Normal range of motion.         General: No tenderness. Skin:     General: Skin is warm and dry.   Neurological:      General: No focal deficit present.      Mental Status: Alert and oriented to person, place and time.           ECG 12 Lead    Date/Time: 12/28/2022 11:03 AM  Performed by: Lilibeth Ramírez APRN  Authorized by: Lilibeth Ramírez APRN   Comparison: compared with previous ECG from 1/6/2021  Similar to previous ECG  Rhythm: sinus rhythm  Rate: normal  BPM: 91  Conduction: conduction normal  ST Segments: ST segments normal  T flattening: I, III and aVL  QRS axis: normal                Assessment:        Diagnosis Plan   1. Class 3 severe obesity without serious comorbidity with body mass index (BMI) of 40.0 to 44.9 in adult, unspecified obesity type (HCC)        2. Hypertension, unspecified type  losartan (COZAAR) 50 MG tablet    furosemide (LASIX) 20 MG tablet    potassium chloride 10 MEQ CR tablet      3. Coronary artery disease involving native coronary artery of native heart without angina pectoris  losartan (COZAAR) 50 MG tablet    furosemide (LASIX) 20 MG tablet    potassium chloride 10 MEQ CR tablet      4. Mixed hyperlipidemia  atorvastatin (LIPITOR) 40 MG tablet      5. Type 2 diabetes mellitus without complication, with long-term current use of insulin ([facility])        6. Obstructive sleep apnea        7. Chronic fatigue          Orders Placed This Encounter   Procedures   • ECG 12 Lead     This order was created via procedure documentation     Order Specific Question:   Release to patient     Answer:   Routine Release          Plan:       1.  Coronary artery disease status post PCI/stent to RCA in 2016.  Normal Lexiscan stress test 2018.  Normal LV systolic function.  Patient had stopped taking aspirin as he was under the impression that after a year of stent placment, he could stop taking both asa and Brilinta.  He will start taking asa 81mg daily. Will start him on metoprolol succinate 25mg daily for CAD and to help with BP.   2.  Hyperlipidemia: Recent lipid panel shows total cholesterol 303 (179), triglycerides 463 (134), HDL 47 (67),  (89). Normal liver enzymes.  Started on statin by PCP.  PCP following.   3.  Diabetes type 2: Most recent glucose 516. Recently started on oral medication.  Follows with endocrinologist.    4.  Hypertension: elevated on losartab 50mg daily.  Will add metoprolol succinate 25mg daily.  He will monitor BP  On normal basis and let me know how readings look. We will make adjustments accordingly.    5. IRINA: CPAP complaint.  Follows with sleep medicine at [facility].       Nella will follow up with Dr. Rucker in 6 months.  He will call sooner for any questions or concerns.           Your medication list          Accurate as of December 28, 2022 11:04 AM. If you have any questions, ask your nurse or doctor.            START taking these medications      Instructions Last Dose Given Next Dose Due   aspirin 81 MG EC tablet  Started by: KATERINA Alex      Take 1 tablet by mouth Daily.       metoprolol succinate XL 25 MG 24 hr tablet  Commonly known as: TOPROL-XL  Started by: KATERINA Alex      Take 1 tablet by mouth Daily.          CONTINUE taking these medications      Instructions Last Dose Given Next Dose Due   albuterol 1.25 MG/3ML nebulizer solution  Commonly known as: ACCUNEB      Take 3 mL by nebulization Every 6 (Six) Hours As Needed for Wheezing.       albuterol sulfate  (90 Base) MCG/ACT inhaler  Commonly known as: Ventolin HFA      Inhale 2 puffs Every 6 (Six) Hours As Needed for Wheezing.       Assure Platinum Meter device      Check glucose 3-4 x daily for uncontrolled blood glucose       atorvastatin 40 MG tablet  Commonly known as: LIPITOR      Take 1 tablet by mouth Every Night.       budesonide-formoterol 160-4.5 MCG/ACT inhaler  Commonly known as: Symbicort      Inhale 2 puffs 2 (Two) Times a Day. Then rinse mouth       doxycycline 100 MG capsule  Commonly known as: VIBRAMYCIN      Take 1 capsule by mouth 2 (Two) Times a Day.       freestyle lancets           furosemide 20 MG tablet  Commonly known as: LASIX      Take 1 tablet by mouth Daily.       glucose blood test strip  Commonly known as: Assure Platinum      Use as instructed       Lancet Device misc      Test glucose 3-4 x daily       Levemir FlexTouch 100 UNIT/ML injection  Generic drug: insulin detemir      30 units daily       losartan 50 MG tablet  Commonly known as: COZAAR      Take 1 tablet by mouth Daily.       montelukast 10 MG tablet  Commonly known as: SINGULAIR      Take 1  tablet by mouth every night at bedtime.       Pen Needles 31G X 6 MM misc      One inj daily       potassium chloride 10 MEQ CR tablet      Take 1 tablet by mouth Daily.       Segluromet 7.5-1000 MG tablet  Generic drug: Ertugliflozin-metFORMIN HCl      Take 1 tablet by mouth 2 (Two) Times a Day With Meals.       tamsulosin 0.4 MG capsule 24 hr capsule  Commonly known as: FLOMAX      Take 1 capsule by mouth Daily.       Trulicity 1.5 MG/0.5ML solution pen-injector  Generic drug: Dulaglutide      Inject 0.75 mg under the skin into the appropriate area as directed 1 (One) Time Per Week.       vitamin B-12 500 MCG tablet  Commonly known as: CYANOCOBALAMIN      Take 1 tablet by mouth Daily.       vitamin D 1.25 MG (17542 UT) capsule capsule  Commonly known as: ERGOCALCIFEROL      Take 1 capsule by mouth 1 (One) Time Per Week.             Where to Get Your Medications      These medications were sent to [facility] [address] [phone]  - [phone] [address] [address]    Phone: [phone]   · aspirin 81 MG EC tablet  · atorvastatin 40 MG tablet  · furosemide 20 MG tablet  · losartan 50 MG tablet  · metoprolol succinate XL 25 MG 24 hr tablet  · potassium chloride 10 MEQ CR tablet           As always, it has been a pleasure to participate in your patient's care.      Sincerely,       KATERINA Santacruz

## 2022-12-28 ENCOUNTER — OFFICE VISIT (OUTPATIENT)
Dept: CARDIOLOGY | Facility: CLINIC | Age: 57
End: 2022-12-28

## 2022-12-28 VITALS
HEART RATE: 91 BPM | DIASTOLIC BLOOD PRESSURE: 94 MMHG | HEIGHT: 70 IN | BODY MASS INDEX: 33.34 KG/M2 | WEIGHT: 232.87 LBS | SYSTOLIC BLOOD PRESSURE: 166 MMHG

## 2022-12-28 DIAGNOSIS — I10 HYPERTENSION, UNSPECIFIED TYPE: ICD-10-CM

## 2022-12-28 DIAGNOSIS — I25.10 CORONARY ARTERY DISEASE INVOLVING NATIVE CORONARY ARTERY OF NATIVE HEART WITHOUT ANGINA PECTORIS: ICD-10-CM

## 2022-12-28 DIAGNOSIS — E11.9 TYPE 2 DIABETES MELLITUS WITHOUT COMPLICATION, WITH LONG-TERM CURRENT USE OF INSULIN: ICD-10-CM

## 2022-12-28 DIAGNOSIS — Z79.4 TYPE 2 DIABETES MELLITUS WITHOUT COMPLICATION, WITH LONG-TERM CURRENT USE OF INSULIN: ICD-10-CM

## 2022-12-28 DIAGNOSIS — R53.82 CHRONIC FATIGUE: ICD-10-CM

## 2022-12-28 DIAGNOSIS — E66.01 CLASS 3 SEVERE OBESITY WITHOUT SERIOUS COMORBIDITY WITH BODY MASS INDEX (BMI) OF 40.0 TO 44.9 IN ADULT, UNSPECIFIED OBESITY TYPE: Primary | ICD-10-CM

## 2022-12-28 DIAGNOSIS — Z95.5 STATUS POST CORONARY ARTERY STENT PLACEMENT: ICD-10-CM

## 2022-12-28 DIAGNOSIS — G47.33 OBSTRUCTIVE SLEEP APNEA: ICD-10-CM

## 2022-12-28 DIAGNOSIS — E78.2 MIXED HYPERLIPIDEMIA: ICD-10-CM

## 2022-12-28 PROCEDURE — 93000 ELECTROCARDIOGRAM COMPLETE: CPT | Performed by: NURSE PRACTITIONER

## 2022-12-28 PROCEDURE — 99214 OFFICE O/P EST MOD 30 MIN: CPT | Performed by: NURSE PRACTITIONER

## 2022-12-28 RX ORDER — FUROSEMIDE 20 MG/1
20 TABLET ORAL DAILY
Qty: 90 TABLET | Refills: 3 | Status: SHIPPED | OUTPATIENT
Start: 2022-12-28

## 2022-12-28 RX ORDER — ATORVASTATIN CALCIUM 40 MG/1
40 TABLET, FILM COATED ORAL NIGHTLY
Qty: 90 TABLET | Refills: 3 | Status: SHIPPED | OUTPATIENT
Start: 2022-12-28

## 2022-12-28 RX ORDER — POTASSIUM CHLORIDE 750 MG/1
10 TABLET, FILM COATED, EXTENDED RELEASE ORAL DAILY
Qty: 90 TABLET | Refills: 3 | Status: SHIPPED | OUTPATIENT
Start: 2022-12-28 | End: 2022-12-30 | Stop reason: SDUPTHER

## 2022-12-28 RX ORDER — ASPIRIN 81 MG/1
81 TABLET ORAL DAILY
Qty: 90 TABLET | Refills: 3 | Status: SHIPPED | OUTPATIENT
Start: 2022-12-28

## 2022-12-28 RX ORDER — METOPROLOL SUCCINATE 25 MG/1
25 TABLET, EXTENDED RELEASE ORAL DAILY
Qty: 90 TABLET | Refills: 3 | Status: SHIPPED | OUTPATIENT
Start: 2022-12-28

## 2022-12-28 RX ORDER — LOSARTAN POTASSIUM 50 MG/1
50 TABLET ORAL DAILY
Qty: 90 TABLET | Refills: 3 | Status: SHIPPED | OUTPATIENT
Start: 2022-12-28

## 2022-12-30 ENCOUNTER — OFFICE VISIT (OUTPATIENT)
Dept: FAMILY MEDICINE CLINIC | Facility: CLINIC | Age: 57
End: 2022-12-30
Payer: COMMERCIAL

## 2022-12-30 VITALS
WEIGHT: 233 LBS | SYSTOLIC BLOOD PRESSURE: 128 MMHG | HEIGHT: 70 IN | TEMPERATURE: 98.2 F | HEART RATE: 89 BPM | DIASTOLIC BLOOD PRESSURE: 70 MMHG | OXYGEN SATURATION: 95 % | BODY MASS INDEX: 33.36 KG/M2

## 2022-12-30 DIAGNOSIS — F41.8 DEPRESSION WITH ANXIETY: ICD-10-CM

## 2022-12-30 DIAGNOSIS — M19.90 ARTHRITIS: ICD-10-CM

## 2022-12-30 DIAGNOSIS — K80.20 CALCULUS OF GALLBLADDER WITHOUT CHOLECYSTITIS WITHOUT OBSTRUCTION: ICD-10-CM

## 2022-12-30 DIAGNOSIS — J45.20 MILD INTERMITTENT ASTHMA WITHOUT COMPLICATION: ICD-10-CM

## 2022-12-30 DIAGNOSIS — E29.1 HYPOGONADISM IN MALE: ICD-10-CM

## 2022-12-30 DIAGNOSIS — E53.8 FOLATE DEFICIENCY: ICD-10-CM

## 2022-12-30 DIAGNOSIS — I10 HYPERTENSION, UNSPECIFIED TYPE: ICD-10-CM

## 2022-12-30 DIAGNOSIS — R74.8 ELEVATED CPK: ICD-10-CM

## 2022-12-30 DIAGNOSIS — E55.9 VITAMIN D DEFICIENCY, UNSPECIFIED: ICD-10-CM

## 2022-12-30 DIAGNOSIS — G47.33 OBSTRUCTIVE SLEEP APNEA: ICD-10-CM

## 2022-12-30 DIAGNOSIS — N52.9 ED (ERECTILE DYSFUNCTION) OF ORGANIC ORIGIN: ICD-10-CM

## 2022-12-30 DIAGNOSIS — E11.65 UNCONTROLLED TYPE 2 DIABETES MELLITUS WITH HYPERGLYCEMIA: ICD-10-CM

## 2022-12-30 DIAGNOSIS — N50.812 LEFT TESTICULAR PAIN: ICD-10-CM

## 2022-12-30 DIAGNOSIS — J44.9 COPD, MILD: ICD-10-CM

## 2022-12-30 DIAGNOSIS — N40.0 BENIGN PROSTATIC HYPERPLASIA, UNSPECIFIED WHETHER LOWER URINARY TRACT SYMPTOMS PRESENT: ICD-10-CM

## 2022-12-30 DIAGNOSIS — M50.120 CERVICAL DISC DISORDER WITH RADICULOPATHY OF MID-CERVICAL REGION: ICD-10-CM

## 2022-12-30 DIAGNOSIS — M51.36 BULGING OF LUMBAR INTERVERTEBRAL DISC: ICD-10-CM

## 2022-12-30 DIAGNOSIS — E78.2 MIXED HYPERLIPIDEMIA: ICD-10-CM

## 2022-12-30 DIAGNOSIS — I25.10 CORONARY ARTERY DISEASE INVOLVING NATIVE CORONARY ARTERY OF NATIVE HEART WITHOUT ANGINA PECTORIS: ICD-10-CM

## 2022-12-30 DIAGNOSIS — L40.9 PSORIASIS: ICD-10-CM

## 2022-12-30 DIAGNOSIS — R93.2 ABNORMAL LIVER DIAGNOSTIC IMAGING: ICD-10-CM

## 2022-12-30 DIAGNOSIS — L02.419 ABSCESS, AXILLA: Primary | ICD-10-CM

## 2022-12-30 DIAGNOSIS — Z23 NEED FOR INFLUENZA VACCINATION: ICD-10-CM

## 2022-12-30 DIAGNOSIS — E53.8 B12 DEFICIENCY: ICD-10-CM

## 2022-12-30 DIAGNOSIS — L02.31 ABSCESS OF BUTTOCK: ICD-10-CM

## 2022-12-30 PROCEDURE — 90686 IIV4 VACC NO PRSV 0.5 ML IM: CPT | Performed by: PHYSICIAN ASSISTANT

## 2022-12-30 PROCEDURE — 90471 IMMUNIZATION ADMIN: CPT | Performed by: PHYSICIAN ASSISTANT

## 2022-12-30 PROCEDURE — 99214 OFFICE O/P EST MOD 30 MIN: CPT | Performed by: PHYSICIAN ASSISTANT

## 2022-12-30 RX ORDER — POTASSIUM CHLORIDE 750 MG/1
CAPSULE, EXTENDED RELEASE ORAL
COMMUNITY
Start: 2022-12-28

## 2022-12-30 RX ORDER — MULTIPLE VITAMINS W/ MINERALS TAB 9MG-400MCG
1 TAB ORAL DAILY
Qty: 90 TABLET | Refills: 1 | Status: SHIPPED | OUTPATIENT
Start: 2022-12-30

## 2022-12-30 RX ORDER — FLUTICASONE PROPIONATE 50 MCG
2 SPRAY, SUSPENSION (ML) NASAL DAILY
Qty: 16 G | Refills: 5 | Status: SHIPPED | OUTPATIENT
Start: 2022-12-30

## 2022-12-30 RX ORDER — SULFAMETHOXAZOLE AND TRIMETHOPRIM 800; 160 MG/1; MG/1
1 TABLET ORAL 2 TIMES DAILY
Qty: 20 TABLET | Refills: 0 | Status: SHIPPED | OUTPATIENT
Start: 2022-12-30 | End: 2023-02-07

## 2022-12-30 NOTE — PROGRESS NOTES
Subjective   Azael Carmen is a 57 y.o. male who presents today to re-establish care here and with specialists and for follow up and medication management for hypertension, B12 and folate deficiencies, cholelithiasis, abnormal liver, diabetes mellitus, hyperlipidemia, and hypogonadism, vitamin D deficiency, IRINA, asthma/COPD, BPH, chronic back pain, and moods.    Diabetes  Hypoglycemia symptoms include nervousness/anxiousness. Associated symptoms include chest pain and fatigue.   Dysuria        Needs flu shot.     Abscesses- coccyx abscess ruptured and is draining. Is now smaller and less painful but still there. Abscess in right axilla still the size of a golf ball. Still there and has not ruptured. Started 2 weeks ago on a Sunday. Started with discomfort and was real sore when reached back. The one on tailbone started 1st then the one under axilla a couple days later.   Patient was seen by KATERINA Hunter 12/20/2022 for abscess right axillary and coccyx areas.  He was started on doxycycline.  Warm compresses advised.  Advised to continue follow-up with me today.    Tooth broke off a couple weeks ago- no pain but needs to see Dentist.     HTN- stable. Doing well on Lasix 40 mg, potassium 10 mEq daily, and Losartan 100 mg once daily.   B12/folate deficiency-B12 was lower normal 11/30/2022 and was advised to take B12 500 mcg daily.  Folate was normal.  He was taking B12 1000 mcg and folic acid 1 mg once daily as directed and tolerated without AE.     Cholelithiasis- still no symptoms, nausea, abd pain, vomiting.  Seen by GI 2021 and was advised no cholecystectomy was necessary at that time due to no symptoms.  Abnormal liver- possible cirrhotic appearance of liver.  Seen by GI 2021 and referred for RUQ ultrasound.  With normal labs, did not think had chronic liver disease.    Endocrinology-Dr. Miguel- monitoring and treating. Last appt 12/2022-  · Diabetes mellitus type 2-advised Levemir 30 units in the morning,  Trulicity 1.5 mg weekly, Segluromet 7.5/1000 mg twice daily, monitor blood sugars twice daily and keep a record.  · He was incarcerated and was given 1 shot daily and metformin. Was not on medications that he had been on. He was previously on Trulicity and Segluromet 7.5/1000 mg twice daily. Patient was also on Tresiba up to 20 units daily. Last endocrine note advised 15 units daily.   · Patient was incarcerated and they started on Levemir and Aspart- only taking Levemir 20 units in the morning and Aspart x 3 in 3 months. Was controlling glucose. This morning took Aspart 4 units and Levemir 20 units this morning. Glucose this am 260- has been cheating more- has not been as good.   · Previously on Segluromet 7.5/1000 mg 2 tablets once daily, Tulicity 1.5 mg, and Tresiba.   · Went to get eyes checked.  · Hyperlipidemia- with CAD and history of elevated CK.  Restarted on Lipitor 40 mg then 20 mg at bedtime and tolerated without AE.   · Hypogonadism- improved with CPAP therapy and treatment of underlying diseases. Was on Cialis and Flomax per endocrinology note.   · Vitamin D deficiency- was on vitamin D 50,000 IU daily.  Was not addressed in endocrinology.  With labs, advised to restart vitamin D 50,000 IU once weekly.    IRINA-seen by sleep medicine 12/2022-they were ordering new supplies and will obtain previous sleep study to determine if new sleep study is needed.  Not using CPAP while incarcerated but restarted when he got home.  Asthma/ COPD- was on Breo once daily and Xopenex as needed. He has not had since incarceration.     When goes with his father, travels a lot and has a hard time checking     BPH- when standing to use the bathroom- takes a while to go. Started riding a recumbent bike and has tailbone pain.   Left testicular pain and continued back pain. No swelling, masses, or lesions.   Chronic back pain- pain in back and left testicle.     Mood disorder-psychiatry- they have not called to schedule appt.    was seeing psychiatry and was on Zoloft 100 mg once daily and Abilify 5 mg at bedtime.     Psoriasis- is not as bad right now. He had been on methotrexate then then stopped once his skin was improving. Not as bad as it was in the past.     Posterior auricular cyst- last visit, patient had a cyst behind his left ear and wanted to see about removal due to enlarging and being bothersome. He was advised to discuss with dermatology, and if they would not remove, I would consider referral to ENT or surgery.     Patient's specialists:  Cardiology- Dr Rucker, Lilibeth Ramírez, APRN- last appt 12/28/2022 for CAD, obesity, IRINA, hypertension, hyperlipidemia, chronic fatigue.  Restart aspirin 81 mg daily and Toprol-XL 25 mg daily.  Continue losartan 50 mg daily.  Follow-up with sleep medicine and endocrinology.  Follow-up 6 months.  · 2/21/19 for CAD s/p PCI, HTN- stopped Ticagrelor due to being 2 years out from stent. Follow up in 6 months. Not scheduled  Endocrinology-Dr. Miguel-last appointment 12/19/2022 for diabetes mellitus, IRINA, hyperlipidemia, CAD, COPD, vitamin D deficiency, hypertension.  Increase Levemir to 30 units every morning, continue Trulicity 1.5 mg weekly and Segluromet 7.5/1000 mg twice daily.  Check blood sugars at least twice daily and keep record.  Eye exam in the near future after blood sugar stabilizes.  Continue atorvastatin 40 mg daily, CPAP, Ventolin, Symbicort, follow-up with pulmonologist, losartan, Lasix, and follow-up with cardiology.  Follow-up 4 to 6 weeks with APRN.  · Prior Dr Hardy/Dariela Funez, APRN- last appt 7/2019 for DM, hyperlipidemia, hypogonadism, and Vitamin D deficiency-  follow up in 3 months. Patient no show 3/2020 appt for follow up  GI-Dr. Segovia- last appointment 1/2021 for hepatic steatosis, cholelithiasis, abnormal liver ultrasound, obesity.  On ultrasound, questionable nodularity although liver enzymes and CBC are in no way suggestive of underlying liver disease,  cholelithiasis without cholecystitis or biliary obstruction.  Referred for right upper quadrant ultrasound.  No indication for cholecystectomy at that time.  Neurosurgery- Dr Carroll- last appt 9/2016 for cervical spine DDD with radiculopathy. Referred to pain management. If no resolution, could consider surgery if so severe he could not live with his pain.   Orthopedic surgery- Dr Flores- last appt 5/29/2019 for follow-up of carpal tunnel release. Appt with Dr Leyva, neurology, 4/15/19. No show final 4 week follow up  Pain management- Dr Porter-last appointment 4/2014 for DDD lumbar and cervical spine. Was on [place] 10 mg once  Pulmonology-Dr. Garcia, Dena Patel, KATERINA-last appointment 12/9/2022 for obesity, IRINA, COPD.  She recommended getting old sleep study from [facility], order new machine-auto CPAP- 12 to 18 cm H2O, establish with new DME-current mask started to leak and requires replacement.  If sleep study is too old for use, he will need to have an updated study.  Follow-up scheduled 4/11/2023 with Dr. Garcia.  · Prior Dr Mata-last appointment-1/2020 for IRINA, asthma/ COPD. Continue Breo, Singulair, and CPAP. He was seen initially and ordered CT chest 4/2/19 with new patchy infiltrate and gallstone.  Follow-up unknown.   Dermatology- Dr Frank Harris- [facility]-last appt 2/2020 for plaque psoriasis.  Treated with Methotrexate, Folic acid, and Clobetasol as directed. Started while incarcerated and continued by dermatology.  He cautioned methotrexate with liver disease and diabetes. Follow up - unsure  Psychiatry- [facility]- on Zoloft, Abilify, and Atarax for anxiety. Last appt- 2 months ago. Follow up next month- sees quarterly.   Dentist- had follow up, up to date with appt  He was seeing Pain management but could not afford to go to [place] regularly. Still having bilateral hand, right shoulder and left pain as well as neck and back pain.   Liver US- irregular margins-  possible cirrhosis and gallstones without cholecystitis    3 1/2 old warrant that they arrested him for previously. He was then released then incarcerated again.   Incarcerated about 17 months- was on all medications but he is unsure of A1C or last labs.     The following portions of the patient's history were reviewed and updated as appropriate: allergies, current medications, past family history, past medical history, past social history, past surgical history and problem list.    Review of Systems   Constitutional: Positive for fatigue.   HENT: Negative.    Eyes: Negative.    Respiratory: Negative.    Cardiovascular: Positive for chest pain.   Endocrine: Negative.    Genitourinary: Positive for difficulty urinating, dysuria and testicular pain.   Musculoskeletal: Positive for back pain.   Skin:        cyst   Allergic/Immunologic: Negative.    Neurological: Negative.    Psychiatric/Behavioral: Positive for decreased concentration. The patient is nervous/anxious.        Objective    Vitals:    12/30/22 1256   BP: 128/70   Pulse: 89   Temp: 98.2 °F (36.8 °C)   SpO2: 95%     Body mass index is 33.43 kg/m².    Physical Exam   Constitutional: He is oriented to person, place, and time. He appears well-developed. No distress.   HENT:   Head: Normocephalic and atraumatic.   Right Ear: External ear normal.   Left Ear: External ear normal.   Eyes: Conjunctivae are normal.   Neck: Carotid bruit is not present. No tracheal deviation present. No thyroid mass and no thyromegaly present.   Cardiovascular: Normal rate, regular rhythm and normal pulses.   Murmur heard.  Pulmonary/Chest: Effort normal and breath sounds normal.   Abdominal: Normal appearance.   Neurological: He is alert and oriented to person, place, and time. Gait normal.   Skin: Skin is warm and dry.        Psychiatric: His behavior is normal. Mood, judgment and thought content normal.   Nursing note and vitals reviewed.  Incision & Drainage    Date/Time:  12/30/2022 1:20 PM  Performed by: Ibis Lacey PA  Authorized by: Ibis Lacey PA   Consent: Verbal consent obtained.  Risks and benefits: risks, benefits and alternatives were discussed  Consent given by: patient  Patient understanding: patient states understanding of the procedure being performed  Patient consent: the patient's understanding of the procedure matches consent given  Procedure consent: procedure consent matches procedure scheduled  Relevant documents: relevant documents present and verified  Test results: test results available and properly labeled  Site marked: the operative site was marked  Imaging studies: imaging studies not available  Patient identity confirmed: verbally with patient  Time out: Immediately prior to procedure a \"time out\" was called to verify the correct patient, procedure, equipment, support staff and site/side marked as required.  Type: abscess  Body area: trunk (right axilla)  Anesthesia method: none.    Anesthesia:  Local anesthetic: none.    Sedation:  Patient sedated: no    Needle gauge: 18  Drainage: purulent and  bloody  Drainage amount: moderate  Wound treatment: wound left open  Packing material: 1/2 in gauze  Patient tolerance: patient tolerated the procedure well with no immediate complications        Assessment & Plan   Diagnoses and all orders for this visit:    1. Abscess, axilla (Primary)  -     sulfamethoxazole-trimethoprim (Bactrim DS) 800-160 MG per tablet; Take 1 tablet by mouth 2 (Two) Times a Day.  Dispense: 20 tablet; Refill: 0  -     mupirocin (BACTROBAN) 2 % ointment; Apply 1 application topically to the appropriate area as directed 3 (Three) Times a Day.  Dispense: 30 g; Refill: 1  -     Anaerobic & Aerobic Culture ([facility] Only) - Swab, Axilla, Right  -     Incision & Drainage    2. Abscess of buttock  -     sulfamethoxazole-trimethoprim (Bactrim DS) 800-160 MG per tablet; Take 1 tablet by mouth 2 (Two) Times a Day.  Dispense: 20 tablet; Refill:  0  -     mupirocin (BACTROBAN) 2 % ointment; Apply 1 application topically to the appropriate area as directed 3 (Three) Times a Day.  Dispense: 30 g; Refill: 1  -     Anaerobic & Aerobic Culture ([facility] Only) - Swab, Axilla, Right    3. Need for influenza vaccination  -     FluLaval/Fluarix/Fluzone >6 Months    4. Hypertension, unspecified type    5. B12 deficiency    6. Folate deficiency    7. Calculus of gallbladder without cholecystitis without obstruction    8. Abnormal liver diagnostic imaging    9. Uncontrolled type 2 diabetes mellitus with hyperglycemia (HCC)    10. Mixed hyperlipidemia    11. Hypogonadism in male    12. Vitamin D deficiency, unspecified    13. Elevated CPK    14. Obstructive sleep apnea    15. COPD, mild (HCC)    16. Mild intermittent asthma without complication    17. Coronary artery disease involving native coronary artery of native heart without angina pectoris    18. ED (erectile dysfunction) of organic origin    19. Benign prostatic hyperplasia, unspecified whether lower urinary tract symptoms present    20. Bulging of lumbar intervertebral disc    21. Left testicular pain    22. Cervical disc disorder with radiculopathy of mid-cervical region    23. Arthritis    24. Depression with anxiety    25. Psoriasis    Other orders  -     multivitamin with minerals tablet tablet; Take 1 tablet by mouth Daily.  Dispense: 90 tablet; Refill: 1  -     fluticasone (FLONASE) 50 MCG/ACT nasal spray; 2 sprays into the nostril(s) as directed by provider Daily.  Dispense: 16 g; Refill: 5        Assessment and plan  Patient had a lapse in healthcare due to prolonged incarceration. He re-established care and re-established with specialists. He was stable again then was incarcerated again for several months. He is still a current patient with specialists but needs follow up with them- I referred back to specialists. He will need to see specialists in follow up and follow up with me in no more than 3 months  for re-evaluation of conditions and specialists.     · Abscess right axilla and sacrum- Patient with recurrent abscesses. He has current abscess right axila and sacrum. I&D of right axilla today. I will start Bactrim DS twice daily. He is to complete warm compress 3-4 x daily then clean, apply Bacitracin. To be seen if worsening, new, or changing symptoms. To ER ASAP if he develops any systemic symptoms. Otherwise, await wound culture and response to treatment.   · Hypertension- Blood pressure is stable today. Continue Losartan 50 mg once daily. Monitor blood pressure and heart rate 3 x daily. Call with readings on Friday. If elevated, we will start Lasix 20 mg once daily and Potassium 10 mEq once daily. Continue to monitor blood pressure and heart rate. Follow up with cardiology for treatment recommendations.   · B12 deficiency- Continue B12 500 mcg daily. I will recheck at follow up.  · Folate deficiency- I will continue to monitor and make treatment recommendations.   · Cholelithiasis- Asymptomatic- he will need to see GI with abnormal liver and will need to consider cholecystectomy once he is up to date and stable with specialists. To be seen here or ER if he becomes symptomatic. I referred to GI.   · Abnormal liver- He needs to see GI with possible cirrhotic appearance of liver.   · Diabetes mellitus type 2- A1C is very elevated at 14.9% with poorly treated diabetes for several years. Continue Segluromet twice daily, Trulicity, and Levemir as directed by endocrinology and follow up with them as directed.    · Hyperlipidemia- Continue Lipitor 40 mg at bedtime and follow up and treatment as directed by cardiology and endocrinology.    · Hypogonadism- I referred back to endocrinology for evaluation and treatment recommendations. He has been limited on treatment with CAD.   · Vitamin D deficiency- Continue vitamin D 50,000 IU once weekly and follow up and treatment as directed by endocrinology.   · IRINA- Continue  CPAP nightly and follow up with pulmonology as directed by them.    · Asthma/ COPD- Continue Breo 1 puff once daily then rinse mouth, Singulair 10 mg once daily, and albuterol and follow up with pulmonology as directed by them.   · BPH with obstructive symptoms- I will refer to urology for evaluation once he is up to date with all other specialists. Restart Flomax 0.4 mg once daily for now.   · Left testicular pain- I will refer to urology for follow up once he is current with other specialists. If normal/ negative evaluation, we will consider DDD lumbar with radiculopathy and follow up with specialists.   · Chronic back pain- Consider further workup or referrals once he is up to date on specialists and treatment.   · Mood disorder- I referred back to psychiatry for management and therapy.   · Posterior auricular cyst- Discuss with dermatology once he is current with other specialists. If they will not remove, I would consider referral to ENT or surgery.     Patient was seen by specialists as noted above.  I have reviewed available records, including consult notes, labs, and imaging/testing.  I will refer back for follow-up and treatment with specialists. He will need to continue to follow up with them as directed.     We will want to have him follow up for CPE and to ensure health maintenance is up to date in the future.     I spent 35 minutes caring for Azael Carmen on this date of service. This time includes time spent by me in the following activities as necessary: preparing for the visit, reviewing tests, specialists records and previous visits, obtaining and/or reviewing a separately obtained history, performing a medically appropriate exam and/or evaluation, counseling and educating the patient, family, caregiver, referring and/or communicating with other healthcare professionals, documenting information in the medical record, independently interpreting results and communicating that information with the  patient, family, caregiver, and developing a medically appropriate treatment plan with consideration of other conditions, medications, and treatments.

## 2023-01-04 LAB
BACTERIA SPEC AEROBE CULT: ABNORMAL
BACTERIA SPEC ANAEROBE CULT: ABNORMAL
BACTERIA SPEC CULT: ABNORMAL
BACTERIA SPEC CULT: ABNORMAL
OTHER ANTIBIOTIC SUSC ISLT: ABNORMAL

## 2023-01-14 DIAGNOSIS — E11.65 UNCONTROLLED TYPE 2 DIABETES MELLITUS WITH HYPERGLYCEMIA: ICD-10-CM

## 2023-01-17 NOTE — TELEPHONE ENCOUNTER
Diabetes medications should now be refilled by endocrinology since he has re-established with them.

## 2023-01-18 RX ORDER — DULAGLUTIDE 1.5 MG/.5ML
INJECTION, SOLUTION SUBCUTANEOUS
Qty: 2 ML | Refills: 0 | Status: SHIPPED | OUTPATIENT
Start: 2023-01-18 | End: 2023-02-07 | Stop reason: DRUGHIGH

## 2023-01-19 ENCOUNTER — TELEPHONE (OUTPATIENT)
Dept: ENDOCRINOLOGY | Age: 58
End: 2023-01-19

## 2023-01-19 NOTE — TELEPHONE ENCOUNTER
PT CALLED IN FOR A MED REFILL REQUEST    insulin detemir (Levemir FlexTouch) 100 UNIT/ML injection [[id number]]     GOING TO:    [facility] [address] [phone] [facility] [phone] FX

## 2023-01-20 DIAGNOSIS — E11.65 UNCONTROLLED TYPE 2 DIABETES MELLITUS WITH HYPERGLYCEMIA: ICD-10-CM

## 2023-01-20 RX ORDER — INSULIN DETEMIR 100 [IU]/ML
30 INJECTION, SOLUTION SUBCUTANEOUS
Qty: 27 ML | Refills: 1
Start: 2023-01-20 | End: 2023-01-24 | Stop reason: SDUPTHER

## 2023-01-24 DIAGNOSIS — E11.65 UNCONTROLLED TYPE 2 DIABETES MELLITUS WITH HYPERGLYCEMIA: ICD-10-CM

## 2023-01-24 RX ORDER — INSULIN DETEMIR 100 [IU]/ML
30 INJECTION, SOLUTION SUBCUTANEOUS
Qty: 27 ML | Refills: 1 | Status: SHIPPED | OUTPATIENT
Start: 2023-01-24 | End: 2023-02-07 | Stop reason: SDUPTHER

## 2023-02-07 ENCOUNTER — OFFICE VISIT (OUTPATIENT)
Dept: ENDOCRINOLOGY | Age: 58
End: 2023-02-07
Payer: COMMERCIAL

## 2023-02-07 VITALS
DIASTOLIC BLOOD PRESSURE: 90 MMHG | HEIGHT: 70 IN | OXYGEN SATURATION: 98 % | SYSTOLIC BLOOD PRESSURE: 160 MMHG | TEMPERATURE: 97.3 F | WEIGHT: 231.6 LBS | HEART RATE: 63 BPM | BODY MASS INDEX: 33.16 KG/M2

## 2023-02-07 DIAGNOSIS — I10 PRIMARY HYPERTENSION: ICD-10-CM

## 2023-02-07 DIAGNOSIS — E11.65 TYPE 2 DIABETES MELLITUS WITH HYPERGLYCEMIA, WITH LONG-TERM CURRENT USE OF INSULIN: Primary | ICD-10-CM

## 2023-02-07 DIAGNOSIS — E78.2 MIXED HYPERLIPIDEMIA: ICD-10-CM

## 2023-02-07 DIAGNOSIS — Z79.4 TYPE 2 DIABETES MELLITUS WITH HYPERGLYCEMIA, WITH LONG-TERM CURRENT USE OF INSULIN: Primary | ICD-10-CM

## 2023-02-07 PROCEDURE — 99214 OFFICE O/P EST MOD 30 MIN: CPT | Performed by: NURSE PRACTITIONER

## 2023-02-07 RX ORDER — INSULIN DETEMIR 100 [IU]/ML
40-60 INJECTION, SOLUTION SUBCUTANEOUS
Qty: 54 ML | Refills: 1 | Status: SHIPPED | OUTPATIENT
Start: 2023-02-07 | End: 2023-03-21

## 2023-02-07 RX ORDER — DULAGLUTIDE 3 MG/.5ML
3 INJECTION, SOLUTION SUBCUTANEOUS WEEKLY
Qty: 6 ML | Refills: 1 | Status: SHIPPED | OUTPATIENT
Start: 2023-02-07 | End: 2023-03-21

## 2023-02-07 NOTE — PATIENT INSTRUCTIONS
Increase Levemir 40 units every morning,   Increase Trulicity 3 mg weekly  Continue with Segluromet 7.5/1,000 mg 1 tablet twice a day

## 2023-02-07 NOTE — PROGRESS NOTES
"Chief Complaint  Chief Complaint   Patient presents with   • Diabetes     Type 2: Pt doesn't have meter, does have readings, is not up to date on eye exam, no hx of retinopathy or neuropathy.        Subjective          History of Present Illness    Azael Carmen 57 y.o. presents for a follow-up evaluation for type 2 DM    He has been diabetic since 2006 and started insulin in 2018    Pt is on the following medications for their DM: Levemir 30 units every morning, Trulicity 1.5 mg weekly and Segluromet 7.5/1,000 mg 1 tablet twice a day.      Pt complains of occasional constipation    Denies diarrhea, chest pain, shortness of breath, vision changes or numbness and tingling in feet/hands.    Last eye exam was unknown    Pt does not have a history of nephropathy.  Patient is currently taking ARB    Pt does not have neuropathy.    Pt does have a history of CAD with previous angioplasty with stent around 2017    Last A1C in 11/22 was 14.9    Last microalbumin in 11/22 was negative          Blood Sugars    Blood glucoses are checked 2/day.    Fasting blood glucoses: high 100s - 200s    Pre-meal blood glucoses: high 100s - 200s    Pt has no episodes of hypoglycemia.            Hyperlipidemia     Pt denies any muscle/body aches, chest pain or shortness of breath    Pt is currently taking  atorvastatin 40 mg HS    Last lipid panel in 11/22 showed Total 303, Triglycerides 463, HDL 47 and             Hypertension    Pt denies any chest pain, palpitations, shortness of breath or headache    Current regimen includes  losartan 50 mg daily, metoprolol succinate 25 mg daily and Lasix 20 mg daily    He has been following with Dr. Rucker.            I have reviewed the patient's allergies, medicines, past medical hx, family hx and social hx.    Objective   Vital Signs:   /90   Pulse 63   Temp 97.3 °F (36.3 °C) (Temporal)   Ht 177.8 cm (70\")   Wt 105 kg (231 lb 9.6 oz)   SpO2 98%   BMI 33.23 kg/m²       Physical Exam "   Physical Exam  Constitutional:       General: He is not in acute distress.     Appearance: Normal appearance. He is not diaphoretic.   HENT:      Head: Normocephalic and atraumatic.   Eyes:      General:         Right eye: No discharge.         Left eye: No discharge.   Skin:     General: Skin is warm and dry.   Neurological:      Mental Status: He is alert.   Psychiatric:         Mood and Affect: Mood normal.         Behavior: Behavior normal.                    Results Review:   Hemoglobin A1C   Date Value Ref Range Status   11/30/2022 14.9 % Final   10/08/2016 7.34 (H) 4.80 - 5.60 % Final     Total Cholesterol   Date Value Ref Range Status   10/08/2016 143 0 - 200 mg/dL Final     Triglycerides   Date Value Ref Range Status   11/30/2022 463 (H) 0 - 149 mg/dL Final   10/08/2016 192 (H) 0 - 150 mg/dL Final     HDL Cholesterol   Date Value Ref Range Status   11/30/2022 47 >39 mg/dL Final   10/08/2016 48 40 - 60 mg/dL Final     LDL Chol Calc (NIH)   Date Value Ref Range Status   11/30/2022 166 (H) 0 - 99 mg/dL Final     VLDL Cholesterol Corey   Date Value Ref Range Status   11/30/2022 90 (H) 5 - 40 mg/dL Final     LDL/HDL RATIO   Date Value Ref Range Status   11/30/2022 3.5 0.0 - 3.6 ratio Final     Comment:                                         LDL/HDL Ratio                                              Men  Women                                1/2 Avg.Risk  1.0    1.5                                    Avg.Risk  3.6    3.2                                 2X Avg.Risk  6.2    5.0                                 3X Avg.Risk  8.0    6.1           Assessment and Plan {CC Problem List  Visit Diagnosis  ROS  Review (Popup)  Health Maintenance  Quality  BestPractice  Medications  SmartSets  SnapShot Encounters  Media :23  Diagnoses and all orders for this visit:    1. Type 2 diabetes mellitus with hyperglycemia, with long-term current use of insulin (HCC) (Primary)  -     Dulaglutide (Trulicity) 3 MG/0.5ML  solution pen-injector; Inject 0.5 mL under the skin into the appropriate area as directed 1 (One) Time Per Week.  Dispense: 6 mL; Refill: 1  -     insulin detemir (Levemir FlexTouch) 100 UNIT/ML injection; Inject 40-60 Units under the skin into the appropriate area as directed Every Morning.  Dispense: 54 mL; Refill: 1    Increase Levemir 40 units every morning,   Increase Trulicity 3 mg weekly  Continue with Segluromet 7.5/1,000 mg 1 tablet twice a day  Continue with BG checks - refuses CGM at this time      2. Mixed hyperlipidemia    Continue with statin      3. Primary hypertension    States that he didn't take his BP medication today but will take when he gets home  Advise to monitor at home and if remains elevated (SBP greater than 140 and/or DBP greater than 80) to follow up with PCP or cardiologist      Refills sent to pharmacy      RTC in 4-6 weeks with me and 6 months with Dr. Miguel      Follow Up     Patient was given instructions and counseling regarding her condition or for health maintenance advice. Please see specific information pulled into the AVS if appropriate.              Kelly Bales, KATERINA  02/07/23

## 2023-02-08 RX ORDER — TAMSULOSIN HYDROCHLORIDE 0.4 MG/1
CAPSULE ORAL
Qty: 30 CAPSULE | Refills: 1 | Status: SHIPPED | OUTPATIENT
Start: 2023-02-08

## 2023-03-06 DIAGNOSIS — E53.8 B12 DEFICIENCY: ICD-10-CM

## 2023-03-06 RX ORDER — CHOLECALCIFEROL (VITAMIN D3) 125 MCG
CAPSULE ORAL
Qty: 90 TABLET | Refills: 0 | Status: SHIPPED | OUTPATIENT
Start: 2023-03-06

## 2023-03-21 ENCOUNTER — OFFICE VISIT (OUTPATIENT)
Dept: ENDOCRINOLOGY | Age: 58
End: 2023-03-21
Payer: COMMERCIAL

## 2023-03-21 VITALS
WEIGHT: 219.2 LBS | HEIGHT: 70 IN | SYSTOLIC BLOOD PRESSURE: 150 MMHG | DIASTOLIC BLOOD PRESSURE: 90 MMHG | HEART RATE: 82 BPM | OXYGEN SATURATION: 99 % | TEMPERATURE: 97.1 F | BODY MASS INDEX: 31.38 KG/M2

## 2023-03-21 DIAGNOSIS — E78.2 MIXED HYPERLIPIDEMIA: ICD-10-CM

## 2023-03-21 DIAGNOSIS — I10 PRIMARY HYPERTENSION: ICD-10-CM

## 2023-03-21 DIAGNOSIS — E11.65 TYPE 2 DIABETES MELLITUS WITH HYPERGLYCEMIA, WITH LONG-TERM CURRENT USE OF INSULIN: Primary | ICD-10-CM

## 2023-03-21 DIAGNOSIS — Z79.4 TYPE 2 DIABETES MELLITUS WITH HYPERGLYCEMIA, WITH LONG-TERM CURRENT USE OF INSULIN: Primary | ICD-10-CM

## 2023-03-21 PROCEDURE — 3077F SYST BP >= 140 MM HG: CPT | Performed by: NURSE PRACTITIONER

## 2023-03-21 PROCEDURE — 99214 OFFICE O/P EST MOD 30 MIN: CPT | Performed by: NURSE PRACTITIONER

## 2023-03-21 PROCEDURE — 3080F DIAST BP >= 90 MM HG: CPT | Performed by: NURSE PRACTITIONER

## 2023-03-21 RX ORDER — DULAGLUTIDE 1.5 MG/.5ML
1.5 INJECTION, SOLUTION SUBCUTANEOUS WEEKLY
Qty: 6 ML | Refills: 1 | Status: SHIPPED | OUTPATIENT
Start: 2023-03-21

## 2023-03-21 RX ORDER — PEN NEEDLE, DIABETIC 31 G X1/4"
NEEDLE, DISPOSABLE MISCELLANEOUS
Qty: 400 EACH | Refills: 1 | Status: SHIPPED | OUTPATIENT
Start: 2023-03-21

## 2023-03-21 RX ORDER — INSULIN ASPART 100 [IU]/ML
INJECTION, SOLUTION INTRAVENOUS; SUBCUTANEOUS
Qty: 6 ML | Refills: 1 | Status: SHIPPED | OUTPATIENT
Start: 2023-03-21

## 2023-03-21 RX ORDER — INSULIN GLARGINE 100 [IU]/ML
40 INJECTION, SOLUTION SUBCUTANEOUS DAILY
Qty: 36 ML | Refills: 1 | Status: SHIPPED | OUTPATIENT
Start: 2023-03-21 | End: 2023-04-04 | Stop reason: SDUPTHER

## 2023-03-21 NOTE — PROGRESS NOTES
Chief Complaint  Chief Complaint   Patient presents with   • Diabetes     Type 2: Pt doesn't have meter, checks bs once a day, is not up to date on eye exam, no hx of retinopathy or neuropathy. Pt states that he is having a hard time getting his medications.        Subjective          History of Present Illness    Azael Carmen 57 y.o. presents for a follow-up evaluation for type 2 DM     He has been diabetic since 2006 and started insulin in 2018     Pt is on the following medications for their DM: Levemir 40 units every morning, Trulicity 3 mg weekly and Segluromet 7.5/1,000 mg 1 tablet twice a day.        Pt complains of occasional constipation, better, and numbness and tingling in right hand - had carpal tunnel surgery in left and has it in right hand, but didn't have surgery.    Denies diarrhea, chest pain, shortness of breath, vision changes or numbness and tingling in feet.    Weight loss of 12 lbs since last visit.    Last eye exam was unknown     Pt does not have a history of nephropathy.  Patient is currently taking ARB     Pt does not have neuropathy.     Pt does have a history of CAD with previous angioplasty with stent around 2017    Last A1C in 11/22 was 14.9    Last microalbumin in 11/22 was negative          Blood Sugars    Blood glucoses are checked 1/day.    Fasting blood glucoses: around 140    Pt has no episodes of hypoglycemia.      refused CGM          Hyperlipidemia     Pt denies any muscle/body aches, chest pain or shortness of breath    Pt is currently taking atorvastatin 40 mg HS    Last lipid panel in 11/22 showed Total 303, Triglycerides 463, HDL 47 and             Hypertension    Pt denies any chest pain, palpitations, shortness of breath or headache    Current regimen includes losartan 50 mg daily, metoprolol succinate 25 mg daily and Lasix 20 mg daily     He has been following with Dr. Rucker.            I have reviewed the patient's allergies, medicines, past medical hx, family  "hx and social hx.    Objective   Vital Signs:   /90   Pulse 82   Temp 97.1 °F (36.2 °C) (Temporal)   Ht 177.8 cm (70\")   Wt 99.4 kg (219 lb 3.2 oz)   SpO2 99%   BMI 31.45 kg/m²       Physical Exam   Physical Exam  Constitutional:       General: He is not in acute distress.     Appearance: Normal appearance. He is not diaphoretic.   HENT:      Head: Normocephalic and atraumatic.   Eyes:      General:         Right eye: No discharge.         Left eye: No discharge.   Skin:     General: Skin is warm and dry.   Neurological:      Mental Status: He is alert.   Psychiatric:         Mood and Affect: Mood normal.         Behavior: Behavior normal.                    Results Review:   Hemoglobin A1C   Date Value Ref Range Status   11/30/2022 14.9 % Final   10/08/2016 7.34 (H) 4.80 - 5.60 % Final     Total Cholesterol   Date Value Ref Range Status   10/08/2016 143 0 - 200 mg/dL Final     Triglycerides   Date Value Ref Range Status   11/30/2022 463 (H) 0 - 149 mg/dL Final   10/08/2016 192 (H) 0 - 150 mg/dL Final     HDL Cholesterol   Date Value Ref Range Status   11/30/2022 47 >39 mg/dL Final   10/08/2016 48 40 - 60 mg/dL Final     LDL Chol Calc (NIH)   Date Value Ref Range Status   11/30/2022 166 (H) 0 - 99 mg/dL Final     VLDL Cholesterol Corey   Date Value Ref Range Status   11/30/2022 90 (H) 5 - 40 mg/dL Final     LDL/HDL RATIO   Date Value Ref Range Status   11/30/2022 3.5 0.0 - 3.6 ratio Final     Comment:                                         LDL/HDL Ratio                                              Men  Women                                1/2 Avg.Risk  1.0    1.5                                    Avg.Risk  3.6    3.2                                 2X Avg.Risk  6.2    5.0                                 3X Avg.Risk  8.0    6.1           Assessment and Plan {CC Problem List  Visit Diagnosis  ROS  Review (Popup)  Health Maintenance  Quality  BestPractice  Medications  SmartSets  SnapShot " Encounters  Media :23  Diagnoses and all orders for this visit:    1. Type 2 diabetes mellitus with hyperglycemia, with long-term current use of insulin (HCC) (Primary)  -     Insulin Glargine (Lantus SoloStar) 100 UNIT/ML injection pen; Inject 40 Units under the skin into the appropriate area as directed Daily.  Dispense: 36 mL; Refill: 1  -     insulin aspart (NovoLOG FlexPen) 100 UNIT/ML solution pen-injector sc pen; 4 units before lunch and dinner  Dispense: 6 mL; Refill: 1  -     Dulaglutide (Trulicity) 1.5 MG/0.5ML solution pen-injector; Inject 1.5 mg under the skin into the appropriate area as directed 1 (One) Time Per Week.  Dispense: 6 mL; Refill: 1  -     Insulin Pen Needle (Pen Needles) 31G X 6 MM misc; Use 3 - 4 times daily  Dispense: 400 each; Refill: 1  -     Hemoglobin A1c  -     Comprehensive Metabolic Panel    Pt is concerned about elevated blood sugars and not being able to get Trulicity and asked about starting meal time insulin.  Levemir 40 units every morning - change to Lantus due to unable to get Levemir  DecreaseTrulicity 1.5 mg weekly - due to unable to get 3 mg, but will go back up when we can  Start Novolog 4 units before lunch and before dinner (usually only eats lunch and dinner)  Continue Segluromet 7.5/1,000 mg 1 tablet twice a day  Check blood sugars in the morning when you get up, before lunch and before dinner - log book given to patient      2. Mixed hyperlipidemia  -     Comprehensive Metabolic Panel  -     Lipid Panel    Check labs today  Continue with statin      3. Primary hypertension  -     Comprehensive Metabolic Panel    Continue with current medication regimen  Defer management to PCP          RTC in 2 months with me and on 08/11/23 with Dr. Miguel      Follow Up     Patient was given instructions and counseling regarding her condition or for health maintenance advice. Please see specific information pulled into the AVS if appropriate.              Kelly Bales,  APRN  03/21/23

## 2023-03-21 NOTE — PATIENT INSTRUCTIONS
Lantus 40 units every morning  DecreaseTrulicity 1.5 mg weekly - due to unable to get 3 mg, but will go back up when we can  Start Novolog 4 units before lunch and before dinner  Continue Segluromet 7.5/1,000 mg 1 tablet twice a day  Check blood sugars in the morning when you get up, before lunch and before dinner

## 2023-03-22 LAB
ALBUMIN SERPL-MCNC: 4.6 G/DL (ref 3.5–5.2)
ALBUMIN/GLOB SERPL: 1.7 G/DL
ALP SERPL-CCNC: 79 U/L (ref 39–117)
ALT SERPL-CCNC: 11 U/L (ref 1–41)
AST SERPL-CCNC: 12 U/L (ref 1–40)
BILIRUB SERPL-MCNC: 0.9 MG/DL (ref 0–1.2)
BUN SERPL-MCNC: 24 MG/DL (ref 6–20)
BUN/CREAT SERPL: 26.1 (ref 7–25)
CALCIUM SERPL-MCNC: 10.1 MG/DL (ref 8.6–10.5)
CHLORIDE SERPL-SCNC: 101 MMOL/L (ref 98–107)
CHOLEST SERPL-MCNC: 120 MG/DL (ref 0–200)
CO2 SERPL-SCNC: 27.8 MMOL/L (ref 22–29)
CREAT SERPL-MCNC: 0.92 MG/DL (ref 0.76–1.27)
EGFRCR SERPLBLD CKD-EPI 2021: 95.8 ML/MIN/1.73
GLOBULIN SER CALC-MCNC: 2.7 GM/DL
GLUCOSE SERPL-MCNC: 143 MG/DL (ref 65–99)
HBA1C MFR BLD: 8.6 % (ref 4.8–5.6)
HDLC SERPL-MCNC: 46 MG/DL (ref 40–60)
IMP & REVIEW OF LAB RESULTS: NORMAL
LDLC SERPL CALC-MCNC: 51 MG/DL (ref 0–100)
POTASSIUM SERPL-SCNC: 4.8 MMOL/L (ref 3.5–5.2)
PROT SERPL-MCNC: 7.3 G/DL (ref 6–8.5)
SODIUM SERPL-SCNC: 139 MMOL/L (ref 136–145)
TRIGL SERPL-MCNC: 130 MG/DL (ref 0–150)
VLDLC SERPL CALC-MCNC: 23 MG/DL (ref 5–40)

## 2023-03-29 ENCOUNTER — OFFICE VISIT (OUTPATIENT)
Dept: FAMILY MEDICINE CLINIC | Facility: CLINIC | Age: 58
End: 2023-03-29
Payer: COMMERCIAL

## 2023-03-29 VITALS
HEART RATE: 80 BPM | RESPIRATION RATE: 18 BRPM | BODY MASS INDEX: 30.75 KG/M2 | OXYGEN SATURATION: 98 % | DIASTOLIC BLOOD PRESSURE: 58 MMHG | TEMPERATURE: 97.2 F | SYSTOLIC BLOOD PRESSURE: 124 MMHG | WEIGHT: 214.8 LBS | HEIGHT: 70 IN

## 2023-03-29 DIAGNOSIS — E53.8 B12 DEFICIENCY: ICD-10-CM

## 2023-03-29 DIAGNOSIS — J06.9 ACUTE URI: ICD-10-CM

## 2023-03-29 DIAGNOSIS — I10 HYPERTENSION, UNSPECIFIED TYPE: Primary | ICD-10-CM

## 2023-03-29 DIAGNOSIS — E55.9 VITAMIN D DEFICIENCY, UNSPECIFIED: ICD-10-CM

## 2023-03-29 PROCEDURE — 3074F SYST BP LT 130 MM HG: CPT | Performed by: PHYSICIAN ASSISTANT

## 2023-03-29 PROCEDURE — 3052F HG A1C>EQUAL 8.0%<EQUAL 9.0%: CPT | Performed by: PHYSICIAN ASSISTANT

## 2023-03-29 PROCEDURE — 99214 OFFICE O/P EST MOD 30 MIN: CPT | Performed by: PHYSICIAN ASSISTANT

## 2023-03-29 PROCEDURE — 3078F DIAST BP <80 MM HG: CPT | Performed by: PHYSICIAN ASSISTANT

## 2023-03-29 RX ORDER — GUAIFENESIN AND DEXTROMETHORPHAN HYDROBROMIDE 600; 30 MG/1; MG/1
1 TABLET, EXTENDED RELEASE ORAL 2 TIMES DAILY
Qty: 45 TABLET | Refills: 0 | Status: SHIPPED | OUTPATIENT
Start: 2023-03-29

## 2023-03-29 RX ORDER — DOXYCYCLINE HYCLATE 100 MG/1
100 CAPSULE ORAL 2 TIMES DAILY
Qty: 20 CAPSULE | Refills: 0 | Status: SHIPPED | OUTPATIENT
Start: 2023-03-29

## 2023-03-29 NOTE — PROGRESS NOTES
Subjective   Azael Carmen is a 57 y.o. male who presents today to re-establish care here and with specialists and for follow up and medication management for hypertension, B12 and folate deficiencies, cholelithiasis, abnormal liver, diabetes mellitus, hyperlipidemia, and hypogonadism, vitamin D deficiency, IRINA, asthma/COPD, BPH, chronic back pain, and moods. He also has cough, sinus pressure and chest pain.     Diabetes  Hypoglycemia symptoms include nervousness/anxiousness. Associated symptoms include chest pain and fatigue.   Dysuria     Hyperlipidemia  Associated symptoms include chest pain.   COPD  He complains of cough. Associated symptoms include chest pain.   Cough  Associated symptoms include chest pain.   Sinusitis  Associated symptoms include coughing.   Nicotine Dependence  Symptoms include decreased concentration and fatigue.      Started 1 week or so ago with sinus drainage and congestion then has had coughing and wheezing. No fever, sore throat, ear pain. Diarrhea a couple times but no vomiting. Pressure started in sinuses for a couple days then resolved.   No contacts with similar symptoms.   Taking some Claritin, Flonase, Singulair, Symbicort, and has albuterol.     Needs flu shot.   HTN- stable. Doing well on Lasix 40 mg, potassium 10 mEq daily, and Losartan 50 mg once daily. Cardiology added Metoprolol XL 25 mg once daily  B12/folate deficiency-taking B12 500 mcg daily  B12 was lower normal 11/30/2022 and was advised to take B12 500 mcg daily.  Folate was normal.  He was taking B12 1000 mcg and folic acid 1 mg once daily as directed and tolerated without AE.     Cholelithiasis- still no symptoms, nausea, abd pain, vomiting.  Seen by GI 2021 and was advised no cholecystectomy was necessary at that time due to no symptoms.  Abnormal liver- possible cirrhotic appearance of liver.  Seen by GI 2021 and referred for RUQ ultrasound.  With normal labs, did not think had chronic liver  disease.    Endocrinology-Dr. Miguel- monitoring and treating. Last appt 12/2022-  · Diabetes mellitus type 2-sugars are improving. Follow up 3/2023.  · He was incarcerated and was given 1 shot daily and metformin. Was not on medications that he had been on. He was previously on Trulicity and Segluromet 7.5/1000 mg twice daily. Patient was also on Tresiba up to 20 units daily. Last endocrine note advised 15 units daily.   · Patient was incarcerated and they started on Levemir and Aspart- only taking Levemir 20 units in the morning and Aspart x 3 in 3 months. Was controlling glucose. This morning took Aspart 4 units and Levemir 20 units this morning. Glucose this am 260- has been cheating more- has not been as good.   · Previously on Segluromet 7.5/1000 mg 2 tablets once daily, Tulicity 1.5 mg, and Tresiba.   · Went to get eyes checked.  · Hyperlipidemia- with CAD and history of elevated CK.  Restarted on Lipitor 40 mg then 20 mg at bedtime and tolerated without AE. Now back on 40 mg daily.   · Hypogonadism- improved with CPAP therapy and treatment of underlying diseases. Was on Cialis and Flomax per endocrinology note.   · Vitamin D deficiency- taking vitamin D 50,000 IU once weekly.   · He was on vitamin D 50,000 IU daily.  Was not addressed in endocrinology.  With labs, advised to restart vitamin D 50,000 IU once weekly.    IRINA-seen by sleep medicine 12/2022-they were ordering new supplies and will obtain previous sleep study to determine if new sleep study is needed. Will go 4/11/2023. Not sure if they got sleep study from [facility].   Not using CPAP while incarcerated but restarted when he got home.  Asthma/ COPD- was on Symbicort once daily and Albuterol as needed. He has not had since incarceration.     When goes with his father, travels a lot and has a hard time checking     BPH- when standing to use the bathroom- takes a while to go. Started riding a recumbent bike and has tailbone pain.   Left testicular  pain and continued back pain. No swelling, masses, or lesions.   Chronic back pain- pain in back and left testicle.     Mood disorder-psychiatry- he has not been seen but will see them soon.   they have not called to schedule appt.   was seeing psychiatry and was on Zoloft 100 mg once daily and Abilify 5 mg at bedtime.     Psoriasis- is not as bad right now. He had been on methotrexate then then stopped once his skin was improving. Not as bad as it was in the past.     Posterior auricular cyst- last visit, patient had a cyst behind his left ear and wanted to see about removal due to enlarging and being bothersome. He was advised to discuss with dermatology, and if they would not remove, I would consider referral to ENT or surgery.     Abscesses- coccyx abscess ruptured and is draining. Is now smaller and less painful but still there. Abscess in right axilla still the size of a golf ball. Still there and has not ruptured. Started 2 weeks ago on a Sunday. Started with discomfort and was real sore when reached back. The one on tailbone started 1st then the one under axilla a couple days later.   Patient was seen by KATERINA Hunter 12/20/2022 for abscess right axillary and coccyx areas.  He was started on doxycycline.  Warm compresses advised.  Advised to continue follow-up with me today.    Tooth broke off a couple weeks ago- no pain but needs to see Dentist.       Patient's specialists:  Cardiology- Lilibeth Jarquin APRN- last appt 12/28/2022 for CAD, obesity, IRINA, hypertension, hyperlipidemia, chronic fatigue.  Restart aspirin 81 mg daily and Toprol-XL 25 mg daily.  Continue losartan 50 mg daily.  Follow-up with sleep medicine and endocrinology.  Follow-up 6 months.  · 2/21/19 for CAD s/p PCI, HTN- stopped Ticagrelor due to being 2 years out from stent. Follow up in 6 months. Not scheduled  Endocrinology-Dr. Miguel-last appointment 3/2023 for diabetes mellitus, IRINA, hyperlipidemia, CAD, COPD, vitamin D  deficiency, hypertension.  Change to Lantus 40 units every morning, continue Trulicity 1.5 mg weekly (will change to 3 mg once available) and Segluromet 7.5/1000 mg twice daily- added Novolog 4 units before lunch and dinner.  Check blood sugars at least twice daily and keep record.  Eye exam in the near future after blood sugar stabilizes.  Continue atorvastatin 40 mg daily, CPAP, Ventolin, Symbicort, follow-up with pulmonologist, losartan, Lasix, and follow-up with cardiology.  Follow-up 2 months.  · Prior Dr Hardy/Dariela Funez, APRN- last appt 7/2019 for DM, hyperlipidemia, hypogonadism, and Vitamin D deficiency-  follow up in 3 months. Patient no show 3/2020 appt for follow up  GI-Dr. Segovia- last appointment 1/2021 for hepatic steatosis, cholelithiasis, abnormal liver ultrasound, obesity.  On ultrasound, questionable nodularity although liver enzymes and CBC are in no way suggestive of underlying liver disease, cholelithiasis without cholecystitis or biliary obstruction.  Referred for right upper quadrant ultrasound.  No indication for cholecystectomy at that time.  Neurosurgery- Dr Carroll- last appt 9/2016 for cervical spine DDD with radiculopathy. Referred to pain management. If no resolution, could consider surgery if so severe he could not live with his pain.   Orthopedic surgery- Dr Flores- last appt 5/29/2019 for follow-up of carpal tunnel release. Appt with Dr Leyva, neurology, 4/15/19. No show final 4 week follow up  Pain management- Dr Porter-last appointment 4/2014 for DDD lumbar and cervical spine. Was on Hermann 10 mg once  Pulmonology-Dena Kurtz, APRN-last appointment 12/9/2022 for obesity, IRINA, COPD.  She recommended getting old sleep study from [facility], order new machine-auto CPAP- 12 to 18 cm H2O, establish with new DME-current mask started to leak and requires replacement.  If sleep study is too old for use, he will need to have an updated study.  Follow-up scheduled  4/11/2023 with Dr. Garcia.  · Prior Dr Mata-last appointment-1/2020 for IRINA, asthma/ COPD. Continue Breo, Singulair, and CPAP. He was seen initially and ordered CT chest 4/2/19 with new patchy infiltrate and gallstone.  Follow-up unknown.   Dermatology- Dr Frank Harris- [facility]-last appt 2/2020 for plaque psoriasis.  Treated with Methotrexate, Folic acid, and Clobetasol as directed. Started while incarcerated and continued by dermatology.  He cautioned methotrexate with liver disease and diabetes. Follow up - unsure  Psychiatry- [facility]- on Zoloft, Abilify, and Atarax for anxiety. Last appt- 2 months ago. Follow up next month- sees quarterly.   Dentist- had follow up, up to date with appt  He was seeing Pain management but could not afford to go to [facility] regularly. Still having bilateral hand, right shoulder and left pain as well as neck and back pain.   Liver US- irregular margins- possible cirrhosis and gallstones without cholecystitis    3 1/2 old warrant that they arrested him for previously. He was then released then incarcerated again.   Incarcerated about 17 months- was on all medications but he is unsure of A1C or last labs.     The following portions of the patient's history were reviewed and updated as appropriate: allergies, current medications, past family history, past medical history, past social history, past surgical history and problem list.    Review of Systems   Constitutional: Positive for fatigue.   HENT: Negative.    Eyes: Negative.    Respiratory: Positive for cough.    Cardiovascular: Positive for chest pain.   Endocrine: Negative.    Genitourinary: Positive for difficulty urinating, dysuria and testicular pain.   Musculoskeletal: Positive for back pain.   Skin:        cyst   Allergic/Immunologic: Negative.    Neurological: Negative.    Psychiatric/Behavioral: Positive for decreased concentration. The patient is nervous/anxious.        Objective     Vitals:    03/29/23 1122   BP: 124/58   Pulse: 80   Resp: 18   Temp: 97.2 °F (36.2 °C)   SpO2: 98%     Body mass index is 30.82 kg/m².    Physical Exam   Constitutional: He is oriented to person, place, and time. He appears well-developed. No distress.   HENT:   Head: Normocephalic and atraumatic.   Right Ear: External ear normal.   Left Ear: External ear normal.   Eyes: Conjunctivae are normal.   Neck: Carotid bruit is not present. No tracheal deviation present. No thyroid mass and no thyromegaly present.   Cardiovascular: Normal rate, regular rhythm and normal pulses.   Murmur heard.  Pulmonary/Chest: Effort normal and breath sounds normal.   Abdominal: Normal appearance.   Neurological: He is alert and oriented to person, place, and time. Gait normal.   Skin: Skin is warm and dry.        Psychiatric: His behavior is normal. Mood, judgment and thought content normal.   Nursing note and vitals reviewed.  Procedures  Assessment & Plan   Diagnoses and all orders for this visit:    1. Hypertension, unspecified type (Primary)    2. B12 deficiency  -     CBC & Differential  -     Vitamin B12 & Folate    3. Vitamin D deficiency, unspecified  -     Vitamin D,25-Hydroxy    4. Acute URI  -     doxycycline (VIBRAMYCIN) 100 MG capsule; Take 1 capsule by mouth 2 (Two) Times a Day.  Dispense: 20 capsule; Refill: 0  -     guaifenesin-dextromethorphan (MUCINEX DM)  MG tablet sustained-release 12 hour tablet; Take 1 tablet by mouth 2 (Two) Times a Day.  Dispense: 45 tablet; Refill: 0        Assessment and plan  Patient had a lapse in healthcare due to prolonged incarceration. He re-established care and re-established with specialists. He was stable again then was incarcerated again for several months. He is still a current patient with specialists but needs follow up with them- I referred back to specialists. He will need to see specialists in follow up and follow up with me in no more than 3 months for re-evaluation of  conditions and specialists.     · Abscess right axilla and sacrum- Patient with recurrent abscesses. He has current abscess right axila and sacrum. I&D of right axilla today. I will start Bactrim DS twice daily. He is to complete warm compress 3-4 x daily then clean, apply Bacitracin. To be seen if worsening, new, or changing symptoms. To ER ASAP if he develops any systemic symptoms. Otherwise, await wound culture and response to treatment.   · Hypertension- Blood pressure is stable today. Continue Losartan 50 mg once daily. Monitor blood pressure and heart rate 3 x daily. Call with readings on Friday. If elevated, we will start Lasix 20 mg once daily and Potassium 10 mEq once daily. Continue to monitor blood pressure and heart rate. Follow up with cardiology for treatment recommendations.   · B12 deficiency- Continue B12 500 mcg daily. I will recheck at follow up.  · Folate deficiency- I will continue to monitor and make treatment recommendations.   · Cholelithiasis- Asymptomatic- he will need to see GI with abnormal liver and will need to consider cholecystectomy once he is up to date and stable with specialists. To be seen here or ER if he becomes symptomatic. I referred to GI.   · Abnormal liver- He needs to see GI with possible cirrhotic appearance of liver.   · Diabetes mellitus type 2- A1C is very elevated at 14.9% with poorly treated diabetes for several years. Continue Segluromet twice daily, Trulicity, and Levemir as directed by endocrinology and follow up with them as directed.    · Hyperlipidemia- Continue Lipitor 40 mg at bedtime and follow up and treatment as directed by cardiology and endocrinology.    · Hypogonadism- I referred back to endocrinology for evaluation and treatment recommendations. He has been limited on treatment with CAD.   · Vitamin D deficiency- Continue vitamin D 50,000 IU once weekly and follow up and treatment as directed by endocrinology.   · IRINA- Continue CPAP nightly and follow  up with pulmonology as directed by them.    · Asthma/ COPD- Continue Breo 1 puff once daily then rinse mouth, Singulair 10 mg once daily, and albuterol and follow up with pulmonology as directed by them.   · BPH with obstructive symptoms- I will refer to urology for evaluation once he is up to date with all other specialists. Restart Flomax 0.4 mg once daily for now.   · Left testicular pain- I will refer to urology for follow up once he is current with other specialists. If normal/ negative evaluation, we will consider DDD lumbar with radiculopathy and follow up with specialists.   · Chronic back pain- Consider further workup or referrals once he is up to date on specialists and treatment.   · Mood disorder- I referred back to psychiatry for management and therapy.   · Posterior auricular cyst- Discuss with dermatology once he is current with other specialists. If they will not remove, I would consider referral to ENT or surgery.     Patient was seen by specialists as noted above.  I have reviewed available records, including consult notes, labs, and imaging/testing.  I will refer back for follow-up and treatment with specialists. He will need to continue to follow up with them as directed.     We will want to have him follow up for CPE and to ensure health maintenance is up to date in the future.     I spent 35 minutes caring for Azael Carmen on this date of service. This time includes time spent by me in the following activities as necessary: preparing for the visit, reviewing tests, specialists records and previous visits, obtaining and/or reviewing a separately obtained history, performing a medically appropriate exam and/or evaluation, counseling and educating the patient, family, caregiver, referring and/or communicating with other healthcare professionals, documenting information in the medical record, independently interpreting results and communicating that information with the patient, family, caregiver,  and developing a medically appropriate treatment plan with consideration of other conditions, medications, and treatments.

## 2023-03-30 LAB
25(OH)D3+25(OH)D2 SERPL-MCNC: 44.4 NG/ML (ref 30–100)
BASOPHILS # BLD AUTO: 0 X10E3/UL (ref 0–0.2)
BASOPHILS NFR BLD AUTO: 1 %
EOSINOPHIL # BLD AUTO: 0.1 X10E3/UL (ref 0–0.4)
EOSINOPHIL NFR BLD AUTO: 2 %
ERYTHROCYTE [DISTWIDTH] IN BLOOD BY AUTOMATED COUNT: 13.7 % (ref 11.6–15.4)
FOLATE SERPL-MCNC: 9.9 NG/ML
HCT VFR BLD AUTO: 49.8 % (ref 37.5–51)
HGB BLD-MCNC: 16.3 G/DL (ref 13–17.7)
IMM GRANULOCYTES # BLD AUTO: 0 X10E3/UL (ref 0–0.1)
IMM GRANULOCYTES NFR BLD AUTO: 0 %
LYMPHOCYTES # BLD AUTO: 2.6 X10E3/UL (ref 0.7–3.1)
LYMPHOCYTES NFR BLD AUTO: 43 %
MCH RBC QN AUTO: 28.5 PG (ref 26.6–33)
MCHC RBC AUTO-ENTMCNC: 32.7 G/DL (ref 31.5–35.7)
MCV RBC AUTO: 87 FL (ref 79–97)
MONOCYTES # BLD AUTO: 0.4 X10E3/UL (ref 0.1–0.9)
MONOCYTES NFR BLD AUTO: 6 %
NEUTROPHILS # BLD AUTO: 3 X10E3/UL (ref 1.4–7)
NEUTROPHILS NFR BLD AUTO: 48 %
PLATELET # BLD AUTO: 436 X10E3/UL (ref 150–450)
RBC # BLD AUTO: 5.71 X10E6/UL (ref 4.14–5.8)
VIT B12 SERPL-MCNC: 443 PG/ML (ref 232–1245)
WBC # BLD AUTO: 6.2 X10E3/UL (ref 3.4–10.8)

## 2023-03-31 DIAGNOSIS — E11.65 UNCONTROLLED TYPE 2 DIABETES MELLITUS WITH HYPERGLYCEMIA: ICD-10-CM

## 2023-03-31 RX ORDER — ERTUGLIFLOZIN AND METFORMIN HYDROCHLORIDE 7.5; 1 MG/1; MG/1
TABLET, FILM COATED ORAL
Qty: 60 TABLET | Refills: 1 | Status: SHIPPED | OUTPATIENT
Start: 2023-03-31

## 2023-04-04 ENCOUNTER — TELEPHONE (OUTPATIENT)
Dept: ENDOCRINOLOGY | Age: 58
End: 2023-04-04
Payer: COMMERCIAL

## 2023-04-04 DIAGNOSIS — E55.9 VITAMIN D DEFICIENCY, UNSPECIFIED: ICD-10-CM

## 2023-04-04 DIAGNOSIS — Z79.4 TYPE 2 DIABETES MELLITUS WITH HYPERGLYCEMIA, WITH LONG-TERM CURRENT USE OF INSULIN: ICD-10-CM

## 2023-04-04 DIAGNOSIS — E11.65 TYPE 2 DIABETES MELLITUS WITH HYPERGLYCEMIA, WITH LONG-TERM CURRENT USE OF INSULIN: ICD-10-CM

## 2023-04-04 RX ORDER — INSULIN GLARGINE 100 [IU]/ML
40 INJECTION, SOLUTION SUBCUTANEOUS DAILY
Qty: 36 ML | Refills: 1 | Status: SHIPPED | OUTPATIENT
Start: 2023-04-04 | End: 2023-07-03

## 2023-04-04 NOTE — TELEPHONE ENCOUNTER
----- Message from CAROLINA Tiwari sent at 4/4/2023 11:26 AM EDT -----  Please have him schedule for monthly B12 injections until follow up.

## 2023-04-04 NOTE — TELEPHONE ENCOUNTER
[facility] called stating they never received the prescription for the pts Lantus, He said they received all the other medications from the same day the lantus was prescribed just not the Lantus.     Phone number: [phone] or fax to [phone]

## 2023-04-05 RX ORDER — ERGOCALCIFEROL 1.25 MG/1
CAPSULE ORAL
Qty: 13 CAPSULE | Refills: 1 | Status: SHIPPED | OUTPATIENT
Start: 2023-04-05

## 2023-04-15 DIAGNOSIS — J44.9 COPD, MILD: ICD-10-CM

## 2023-04-15 DIAGNOSIS — J45.20 MILD INTERMITTENT ASTHMA WITHOUT COMPLICATION: ICD-10-CM

## 2023-04-17 RX ORDER — BUDESONIDE AND FORMOTEROL FUMARATE DIHYDRATE 160; 4.5 UG/1; UG/1
2 AEROSOL RESPIRATORY (INHALATION)
Qty: 30.6 G | Refills: 0 | Status: SHIPPED | OUTPATIENT
Start: 2023-04-17 | End: 2023-07-16

## 2023-04-17 RX ORDER — ALBUTEROL SULFATE 90 UG/1
2 AEROSOL, METERED RESPIRATORY (INHALATION) EVERY 6 HOURS PRN
Qty: 18 G | Refills: 2 | Status: SHIPPED | OUTPATIENT
Start: 2023-04-17 | End: 2023-07-16

## (undated) DEVICE — SUT ETHLN 3/0 PC5 18IN 1893G

## (undated) DEVICE — PAD,ABDOMINAL,8"X10",ST,LF: Brand: MEDLINE

## (undated) DEVICE — BNDG ELAS MATRX  2IN 5YD LF STRL

## (undated) DEVICE — NDL HYPO PRECISIONGLIDE REG 25G 1 1/2

## (undated) DEVICE — DRSNG WND GZ CURAD OIL EMULSION 3X3IN STRL

## (undated) DEVICE — SKIN PREP TRAY W/CHG: Brand: MEDLINE INDUSTRIES, INC.

## (undated) DEVICE — APPL CHLORAPREP W/TINT 26ML ORNG

## (undated) DEVICE — SUT VIC 5/0 P3 18IN J493G

## (undated) DEVICE — DISPOSABLE TOURNIQUET CUFF SINGLE BLADDER, SINGLE PORT AND QUICK CONNECT CONNECTOR: Brand: COLOR CUFF

## (undated) DEVICE — WEBRIL* CAST PADDING: Brand: DEROYAL

## (undated) DEVICE — DRAPE,U/ SHT,SPLIT,PLAS,STERIL: Brand: MEDLINE

## (undated) DEVICE — DISPOSABLE BIPOLAR FORCEPS 4" (10.2CM) JEWELERS, STRAIGHT 0.4MM TIP AND 12 FT. (3.6M) CABLE: Brand: KIRWAN

## (undated) DEVICE — GLV SURG TRIUMPH CLASSIC PF LTX 7 STRL

## (undated) DEVICE — SUT ETHLN 4/0 PS2 PLSTC 1667G

## (undated) DEVICE — GLV SURG BIOGEL LTX PF 6 1/2

## (undated) DEVICE — GLV SURG BIOGEL LTX PF 8 1/2

## (undated) DEVICE — GLV SURG TRIUMPH CLASSIC PF LTX 8.5 STRL

## (undated) DEVICE — PK ORTHO MINOR TOWER 40

## (undated) DEVICE — SOL ISO/ALC RUB 70PCT 4OZ